# Patient Record
Sex: FEMALE | Race: BLACK OR AFRICAN AMERICAN | Employment: FULL TIME | URBAN - METROPOLITAN AREA
[De-identification: names, ages, dates, MRNs, and addresses within clinical notes are randomized per-mention and may not be internally consistent; named-entity substitution may affect disease eponyms.]

---

## 2017-03-24 ENCOUNTER — HOSPITAL ENCOUNTER (EMERGENCY)
Age: 32
Discharge: HOME | End: 2017-03-24
Attending: EMERGENCY MEDICINE | Admitting: EMERGENCY MEDICINE
Payer: MEDICAID

## 2017-03-24 ENCOUNTER — APPOINTMENT (OUTPATIENT)
Dept: GENERAL RADIOLOGY | Age: 32
End: 2017-03-24
Attending: PHYSICIAN ASSISTANT
Payer: MEDICAID

## 2017-03-24 VITALS
WEIGHT: 140.43 LBS | DIASTOLIC BLOOD PRESSURE: 71 MMHG | TEMPERATURE: 98.1 F | RESPIRATION RATE: 12 BRPM | BODY MASS INDEX: 23.4 KG/M2 | HEIGHT: 65 IN | HEART RATE: 72 BPM | SYSTOLIC BLOOD PRESSURE: 131 MMHG | OXYGEN SATURATION: 100 %

## 2017-03-24 DIAGNOSIS — M43.6 TORTICOLLIS: ICD-10-CM

## 2017-03-24 DIAGNOSIS — M54.2 NECK PAIN: Primary | ICD-10-CM

## 2017-03-24 LAB — HCG UR QL: NEGATIVE

## 2017-03-24 PROCEDURE — 72050 X-RAY EXAM NECK SPINE 4/5VWS: CPT

## 2017-03-24 PROCEDURE — 99284 EMERGENCY DEPT VISIT MOD MDM: CPT

## 2017-03-24 PROCEDURE — 81025 URINE PREGNANCY TEST: CPT

## 2017-03-24 PROCEDURE — 74011250637 HC RX REV CODE- 250/637: Performed by: PHYSICIAN ASSISTANT

## 2017-03-24 RX ORDER — DIAZEPAM 5 MG/1
5 TABLET ORAL
Qty: 10 TAB | Refills: 0 | Status: SHIPPED | OUTPATIENT
Start: 2017-03-24 | End: 2017-05-12

## 2017-03-24 RX ORDER — ALBUTEROL SULFATE 90 UG/1
2 AEROSOL, METERED RESPIRATORY (INHALATION)
COMMUNITY
End: 2019-07-09 | Stop reason: SDUPTHER

## 2017-03-24 RX ORDER — OXYCODONE AND ACETAMINOPHEN 5; 325 MG/1; MG/1
1 TABLET ORAL
Status: COMPLETED | OUTPATIENT
Start: 2017-03-24 | End: 2017-03-24

## 2017-03-24 RX ORDER — OXYCODONE AND ACETAMINOPHEN 5; 325 MG/1; MG/1
1 TABLET ORAL
Qty: 12 TAB | Refills: 0 | Status: SHIPPED | OUTPATIENT
Start: 2017-03-24 | End: 2017-05-12

## 2017-03-24 RX ORDER — DIAZEPAM 5 MG/1
5 TABLET ORAL
Status: COMPLETED | OUTPATIENT
Start: 2017-03-24 | End: 2017-03-24

## 2017-03-24 RX ADMIN — DIAZEPAM 5 MG: 5 TABLET ORAL at 08:53

## 2017-03-24 RX ADMIN — OXYCODONE HYDROCHLORIDE AND ACETAMINOPHEN 1 TABLET: 5; 325 TABLET ORAL at 08:53

## 2017-03-24 NOTE — ED NOTES
The provider reviewed discharge instructions with the patient. The patient verbalized understanding.

## 2017-03-24 NOTE — LETTER
[address] 
[facility] EMERGENCY DEPT 
[address] [place] [address] [id number] 
[phone] Work/School Note Date: 3/24/2017 To Whom It May concern: 
 
Gurdeep Menendez was seen and treated today in the emergency room by the following provider(s): 
Attending Provider: Shama Tripathi MD 
Physician Assistant: TONIE French. Gurdeep Menendez No work 48 hours. Sincerely, TONIE French

## 2017-03-24 NOTE — ED PROVIDER NOTES
HPI Comments: Abdifatah Lou is a 32 y.o. female with PMhx significant for Asthma who presents ambulatory to the ED with cc of gradually worsening neck pain x 4 days. Pt denies use of medication to modify her symptoms. She reports that she woke up with her symptoms noting that she sleeps with the window open. She denies any arm pain, numbness, weakness, or gait change. Social history significant for: + Tobacco ([id number]), + EtOH, - Illicit drug use    PCP: None    There are no other complaints, changes or physical findings at this time. Written by LESLY Hawley, as dictated by [employer]      The history is provided by the patient. No  was used. Past Medical History:   Diagnosis Date    Asthma     Chest pain, musculoskeletal        History reviewed. No pertinent surgical history. History reviewed. No pertinent family history. Social History     Social History    Marital status: SINGLE     Spouse name: N/A    Number of children: N/A    Years of education: N/A     Occupational History    Not on file. Social History Main Topics    Smoking status: Current Every Day Smoker     Packs/day: 0.25     Years: 10.00    Smokeless tobacco: Not on file    Alcohol use Yes      Comment: socially    Drug use: No    Sexual activity: Yes     Partners: Male     Birth control/ protection: None     Other Topics Concern    Not on file     Social History Narrative         ALLERGIES: Tomato    Review of Systems   Musculoskeletal: Positive for neck pain. Negative for myalgias. Neurological: Negative for weakness and numbness.        - gait changes   All other systems reviewed and are negative. Patient Vitals for the past 12 hrs:   Temp Pulse Resp BP SpO2   03/24/17 0839 98.1 °F (36.7 °C) 72 12 131/71 100 %       Physical Exam   Constitutional: She is oriented to person, place, and time. She appears well-developed and well-nourished. No distress.    HENT: Head: Normocephalic and atraumatic. Right Ear: External ear normal.   Left Ear: External ear normal.   Nose: Nose normal.   Mouth/Throat: Oropharynx is clear and moist. No oropharyngeal exudate. Eyes: Conjunctivae and EOM are normal. Pupils are equal, round, and reactive to light. Right eye exhibits no discharge. Left eye exhibits no discharge. No scleral icterus. Neck: Normal range of motion. Neck supple. No JVD present. No tracheal deviation present. Cardiovascular: Normal rate, regular rhythm, normal heart sounds and intact distal pulses. Exam reveals no gallop and no friction rub. No murmur heard. Pulmonary/Chest: Effort normal and breath sounds normal. No respiratory distress. She has no wheezes. She has no rales. She exhibits no tenderness. Abdominal: Soft. Bowel sounds are normal. She exhibits no distension and no mass. There is no tenderness. There is no rebound and no guarding. Musculoskeletal: She exhibits no edema or tenderness. Tenderness to right sternocleidomastoid  Decreased active passive ROM secondary to pain   Lymphadenopathy:     She has no cervical adenopathy. Neurological: She is alert and oriented to person, place, and time. She has normal reflexes. No cranial nerve deficit. She exhibits normal muscle tone. Coordination normal.   Skin: Skin is warm and dry. She is not diaphoretic. Psychiatric: She has a normal mood and affect. Her behavior is normal. Judgment and thought content normal.   Nursing note and vitals reviewed.        MDM  Number of Diagnoses or Management Options  Neck pain:   Torticollis:   Diagnosis management comments:   DDx: DDD, DJD, torticollar       Amount and/or Complexity of Data Reviewed  Clinical lab tests: ordered and reviewed  Tests in the radiology section of CPT®: ordered and reviewed  Review and summarize past medical records: yes    Patient Progress  Patient progress: stable    Procedures    LABORATORY TESTS:  Recent Results (from the past 12 hour(s))   HCG URINE, QL. - POC    Collection Time: 03/24/17  8:56 AM   Result Value Ref Range    Pregnancy test,urine (POC) NEGATIVE  NEG         IMAGING RESULTS:  XR SPINE CERV 4 OR 5 V   Final Result   EXAM: XR SPINE CERV 4 OR 5 V     INDICATION: Right-sided neck pain and stiffness for 4 days.     COMPARISON: None.     TECHNIQUE: 5 views cervical spine.     FINDINGS: Vertebral body heights and intervertebral disc spaces are maintained. There is no abnormality in alignment. The neural foramen are patent bilaterally. The C1-2 relationship is within normal limits. The prevertebral soft tissues are  unremarkable.     IMPRESSION  IMPRESSION: No acute abnormality. MEDICATIONS GIVEN:  Medications   oxyCODONE-acetaminophen (PERCOCET) 5-325 mg per tablet 1 Tab (1 Tab Oral Given 3/24/17 0853)   diazePAM (VALIUM) tablet 5 mg (5 mg Oral Given 3/24/17 0853)       IMPRESSION:  1. Neck pain    2. Torticollis        PLAN:  1. Current Discharge Medication List      START taking these medications    Details   oxyCODONE-acetaminophen (PERCOCET) 5-325 mg per tablet Take 1 Tab by mouth every six (6) hours as needed for Pain. Max Daily Amount: 4 Tabs. Qty: 12 Tab, Refills: 0      diazePAM (VALIUM) 5 mg tablet Take 1 Tab by mouth every twelve (12) hours as needed (spasm). Max Daily Amount: 10 mg.  Qty: 10 Tab, Refills: 0           2. Follow-up Information     Follow up With Details Comments Contact Info    None In 2 days As needed None ([phone]) Patient stated that they have no PCP      Melissa Bradford MD In 2 days As needed [address] [place] [address]  [phone]          Return to ED if worse     DISCHARGE NOTE  9:52 AM  The patient has been re-evaluated and is ready for discharge. Reviewed available results with patient. Counseled patient on diagnosis and care plan. Patient has expressed understanding, and all questions have been answered.  Patient agrees with plan and agrees to follow up as recommended, or return to the ED if their symptoms worsen. Discharge instructions have been provided and explained to the patient, along with reasons to return to the ED. This note is prepared by Thelma Calderon, acting as Scribe for [employer] TherapeuticsRaquel Barfield: The scribe's documentation has been prepared under my direction and personally reviewed by me in its entirety. I confirm that the note above accurately reflects all work, treatment, procedures, and medical decision making performed by me.

## 2017-03-24 NOTE — DISCHARGE INSTRUCTIONS
Neck Pain: Care Instructions  Your Care Instructions  You can have neck pain anywhere from the bottom of your head to the top of your shoulders. It can spread to the upper back or arms. Injuries, painting a ceiling, sleeping with your neck twisted, staying in one position for too long, and many other activities can cause neck pain. Most neck pain gets better with home care. Your doctor may recommend medicine to relieve pain or relax your muscles. He or she may suggest exercise and physical therapy to increase flexibility and relieve stress. You may need to wear a special (cervical) collar to support your neck for a day or two. Follow-up care is a key part of your treatment and safety. Be sure to make and go to all appointments, and call your doctor if you are having problems. It's also a good idea to know your test results and keep a list of the medicines you take. How can you care for yourself at home? · Try using a heating pad on a low or medium setting for 15 to 20 minutes every 2 or 3 hours. Try a warm shower in place of one session with the heating pad. · You can also try an ice pack for 10 to 15 minutes every 2 to 3 hours. Put a thin cloth between the ice and your skin. · Take pain medicines exactly as directed. ¨ If the doctor gave you a prescription medicine for pain, take it as prescribed. ¨ If you are not taking a prescription pain medicine, ask your doctor if you can take an over-the-counter medicine. · If your doctor recommends a cervical collar, wear it exactly as directed. When should you call for help? Call your doctor now or seek immediate medical care if:  · You have new or worsening numbness in your arms, buttocks or legs. · You have new or worsening weakness in your arms or legs. (This could make it hard to stand up.)  · You lose control of your bladder or bowels.   Watch closely for changes in your health, and be sure to contact your doctor if:  · Your neck pain is getting worse.  · You are not getting better after 1 week. · You do not get better as expected. Where can you learn more? Go to http://blane-danna.info/. Enter [id number] in the search box to learn more about \"Neck Pain: Care Instructions. \"  Current as of: May 23, 2016  Content Version: 11.1  © 9697-0284 [employer]. Care instructions adapted under license by [employer] (which disclaims liability or warranty for this information). If you have questions about a medical condition or this instruction, always ask your healthcare professional. [place] [address] any warranty or liability for your use of this information. Torticollis: Care Instructions  Your Care Instructions  Torticollis is a severe tightness of the muscles on one side of the neck. The tight muscles can make the head turn to one side, lean to one side, or be pulled forward or backward. It is also called wryneck. Your doctor asked questions about your health and examined you. You may also have had X-rays or other tests. If your doctor thinks another medical problem is causing your tight neck muscles, you may need more tests. Torticollis usually gets better with home care. Your doctor may have you take medicine to relieve pain or relax your muscles. He or she may suggest exercise and physical therapy to help increase flexibility and relieve stress. Your doctor may also have you wear a special collar, called a cervical collar, for a day or two. The collar may help make your neck more comfortable. Follow-up care is a key part of your treatment and safety. Be sure to make and go to all appointments, and call your doctor if you are having problems. It's also a good idea to know your test results and keep a list of the medicines you take. How can you care for yourself at home? · Be safe with medicines. Read and follow all instructions on the label.   ¨ If the doctor gave you a prescription medicine for pain, take it as prescribed. ¨ If you are not taking a prescription pain medicine, ask your doctor if you can take an over-the-counter medicine. · Try using a heating pad on a low or medium setting for 15 to 20 minutes every 2 or 3 hours. Try a warm shower in place of one session with the heating pad. · Try using an ice pack for 10 to 15 minutes every 2 to 3 hours. Put a thin cloth between the ice and your skin. · If your doctor recommends a cervical collar, wear it exactly as directed. When should you call for help? Call your doctor now or seek immediate medical care if:  · You have new or worse numbness in your arms, buttocks, or legs. · You have new or worse weakness in your arms or legs. · Your neck pain gets worse. · You lose bladder or bowel control. Watch closely for changes in your health, and be sure to contact your doctor if:  · You do not get better as expected. Where can you learn more? Go to http://blane-danna.info/. Enter G651 in the search box to learn more about \"Torticollis: Care Instructions. \"  Current as of: May 23, 2016  Content Version: 11.1  © 6087-2523 [employer]. Care instructions adapted under license by [employer] (which disclaims liability or warranty for this information). If you have questions about a medical condition or this instruction, always ask your healthcare professional. [place] [address] any warranty or liability for your use of this information.

## 2017-05-12 ENCOUNTER — APPOINTMENT (OUTPATIENT)
Dept: GENERAL RADIOLOGY | Age: 32
End: 2017-05-12
Attending: EMERGENCY MEDICINE
Payer: MEDICAID

## 2017-05-12 ENCOUNTER — APPOINTMENT (OUTPATIENT)
Dept: CT IMAGING | Age: 32
End: 2017-05-12
Attending: EMERGENCY MEDICINE
Payer: MEDICAID

## 2017-05-12 ENCOUNTER — HOSPITAL ENCOUNTER (EMERGENCY)
Age: 32
Discharge: HOME | End: 2017-05-12
Attending: EMERGENCY MEDICINE | Admitting: EMERGENCY MEDICINE
Payer: MEDICAID

## 2017-05-12 VITALS
HEART RATE: 77 BPM | DIASTOLIC BLOOD PRESSURE: 64 MMHG | WEIGHT: 131.84 LBS | OXYGEN SATURATION: 100 % | SYSTOLIC BLOOD PRESSURE: 104 MMHG | TEMPERATURE: 98.9 F | BODY MASS INDEX: 21.97 KG/M2 | HEIGHT: 65 IN | RESPIRATION RATE: 16 BRPM

## 2017-05-12 DIAGNOSIS — R07.89 ATYPICAL CHEST PAIN: ICD-10-CM

## 2017-05-12 DIAGNOSIS — S06.0X0A CONCUSSION, WITHOUT LOC, INITIAL ENCOUNTER: Primary | ICD-10-CM

## 2017-05-12 DIAGNOSIS — R42 LIGHTHEADEDNESS: ICD-10-CM

## 2017-05-12 DIAGNOSIS — N30.00 ACUTE CYSTITIS WITHOUT HEMATURIA: ICD-10-CM

## 2017-05-12 LAB
ALBUMIN SERPL BCP-MCNC: 3.7 G/DL (ref 3.5–5)
ALBUMIN/GLOB SERPL: 1 {RATIO} (ref 1.1–2.2)
ALP SERPL-CCNC: 56 U/L (ref 45–117)
ALT SERPL-CCNC: 12 U/L (ref 12–78)
ANION GAP BLD CALC-SCNC: 4 MMOL/L (ref 5–15)
APPEARANCE UR: ABNORMAL
AST SERPL W P-5'-P-CCNC: 9 U/L (ref 15–37)
BACTERIA URNS QL MICRO: ABNORMAL /HPF
BASOPHILS # BLD AUTO: 0.1 K/UL (ref 0–0.1)
BASOPHILS # BLD: 1 % (ref 0–1)
BILIRUB SERPL-MCNC: 0.4 MG/DL (ref 0.2–1)
BILIRUB UR QL: NEGATIVE
BUN SERPL-MCNC: 6 MG/DL (ref 6–20)
BUN/CREAT SERPL: 9 (ref 12–20)
CALCIUM SERPL-MCNC: 8.4 MG/DL (ref 8.5–10.1)
CHLORIDE SERPL-SCNC: 107 MMOL/L (ref 97–108)
CK SERPL-CCNC: 149 U/L (ref 26–192)
CO2 SERPL-SCNC: 29 MMOL/L (ref 21–32)
COLOR UR: ABNORMAL
CREAT SERPL-MCNC: 0.68 MG/DL (ref 0.55–1.02)
EOSINOPHIL # BLD: 0.5 K/UL (ref 0–0.4)
EOSINOPHIL NFR BLD: 7 % (ref 0–7)
EPITH CASTS URNS QL MICRO: ABNORMAL /LPF
ERYTHROCYTE [DISTWIDTH] IN BLOOD BY AUTOMATED COUNT: 12.3 % (ref 11.5–14.5)
GLOBULIN SER CALC-MCNC: 3.6 G/DL (ref 2–4)
GLUCOSE SERPL-MCNC: 79 MG/DL (ref 65–100)
GLUCOSE UR STRIP.AUTO-MCNC: NEGATIVE MG/DL
HCG UR QL: NEGATIVE
HCT VFR BLD AUTO: 36.6 % (ref 35–47)
HGB BLD-MCNC: 12.6 G/DL (ref 11.5–16)
HGB UR QL STRIP: NEGATIVE
KETONES UR QL STRIP.AUTO: NEGATIVE MG/DL
LEUKOCYTE ESTERASE UR QL STRIP.AUTO: ABNORMAL
LYMPHOCYTES # BLD AUTO: 29 % (ref 12–49)
LYMPHOCYTES # BLD: 2.1 K/UL (ref 0.8–3.5)
MAGNESIUM SERPL-MCNC: 2.1 MG/DL (ref 1.6–2.4)
MCH RBC QN AUTO: 32.4 PG (ref 26–34)
MCHC RBC AUTO-ENTMCNC: 34.4 G/DL (ref 30–36.5)
MCV RBC AUTO: 94.1 FL (ref 80–99)
MONOCYTES # BLD: 0.6 K/UL (ref 0–1)
MONOCYTES NFR BLD AUTO: 9 % (ref 5–13)
NEUTS SEG # BLD: 4.1 K/UL (ref 1.8–8)
NEUTS SEG NFR BLD AUTO: 54 % (ref 32–75)
NITRITE UR QL STRIP.AUTO: NEGATIVE
PH UR STRIP: 7.5 [PH] (ref 5–8)
PLATELET # BLD AUTO: 253 K/UL (ref 150–400)
POTASSIUM SERPL-SCNC: 3.8 MMOL/L (ref 3.5–5.1)
PROT SERPL-MCNC: 7.3 G/DL (ref 6.4–8.2)
PROT UR STRIP-MCNC: NEGATIVE MG/DL
RBC # BLD AUTO: 3.89 M/UL (ref 3.8–5.2)
RBC #/AREA URNS HPF: ABNORMAL /HPF (ref 0–5)
SODIUM SERPL-SCNC: 140 MMOL/L (ref 136–145)
SP GR UR REFRACTOMETRY: 1.01 (ref 1–1.03)
TROPONIN I SERPL-MCNC: <0.04 NG/ML
UROBILINOGEN UR QL STRIP.AUTO: 0.2 EU/DL (ref 0.2–1)
WBC # BLD AUTO: 7.4 K/UL (ref 3.6–11)
WBC URNS QL MICRO: ABNORMAL /HPF (ref 0–4)

## 2017-05-12 PROCEDURE — 81001 URINALYSIS AUTO W/SCOPE: CPT | Performed by: EMERGENCY MEDICINE

## 2017-05-12 PROCEDURE — 36415 COLL VENOUS BLD VENIPUNCTURE: CPT | Performed by: EMERGENCY MEDICINE

## 2017-05-12 PROCEDURE — 85025 COMPLETE CBC W/AUTO DIFF WBC: CPT | Performed by: EMERGENCY MEDICINE

## 2017-05-12 PROCEDURE — 81025 URINE PREGNANCY TEST: CPT | Performed by: EMERGENCY MEDICINE

## 2017-05-12 PROCEDURE — 96374 THER/PROPH/DIAG INJ IV PUSH: CPT

## 2017-05-12 PROCEDURE — 83735 ASSAY OF MAGNESIUM: CPT | Performed by: EMERGENCY MEDICINE

## 2017-05-12 PROCEDURE — 99284 EMERGENCY DEPT VISIT MOD MDM: CPT

## 2017-05-12 PROCEDURE — 93005 ELECTROCARDIOGRAM TRACING: CPT

## 2017-05-12 PROCEDURE — 84484 ASSAY OF TROPONIN QUANT: CPT | Performed by: EMERGENCY MEDICINE

## 2017-05-12 PROCEDURE — 74011250636 HC RX REV CODE- 250/636: Performed by: EMERGENCY MEDICINE

## 2017-05-12 PROCEDURE — 96361 HYDRATE IV INFUSION ADD-ON: CPT

## 2017-05-12 PROCEDURE — 80053 COMPREHEN METABOLIC PANEL: CPT | Performed by: EMERGENCY MEDICINE

## 2017-05-12 PROCEDURE — 70450 CT HEAD/BRAIN W/O DYE: CPT

## 2017-05-12 PROCEDURE — 82550 ASSAY OF CK (CPK): CPT | Performed by: EMERGENCY MEDICINE

## 2017-05-12 PROCEDURE — 71020 XR CHEST PA LAT: CPT

## 2017-05-12 PROCEDURE — 96375 TX/PRO/DX INJ NEW DRUG ADDON: CPT

## 2017-05-12 RX ORDER — METHOCARBAMOL 750 MG/1
750 TABLET, FILM COATED ORAL
Qty: 20 TAB | Refills: 0 | Status: SHIPPED | OUTPATIENT
Start: 2017-05-12 | End: 2017-05-21

## 2017-05-12 RX ORDER — DIAZEPAM 10 MG/2ML
2 INJECTION INTRAMUSCULAR
Status: COMPLETED | OUTPATIENT
Start: 2017-05-12 | End: 2017-05-12

## 2017-05-12 RX ORDER — DIPHENHYDRAMINE HYDROCHLORIDE 50 MG/ML
25 INJECTION, SOLUTION INTRAMUSCULAR; INTRAVENOUS
Status: COMPLETED | OUTPATIENT
Start: 2017-05-12 | End: 2017-05-12

## 2017-05-12 RX ORDER — ONDANSETRON 4 MG/1
4 TABLET, ORALLY DISINTEGRATING ORAL
Qty: 10 TAB | Refills: 0 | Status: SHIPPED | OUTPATIENT
Start: 2017-05-12 | End: 2019-08-05

## 2017-05-12 RX ORDER — ALBUTEROL SULFATE 0.83 MG/ML
SOLUTION RESPIRATORY (INHALATION)
COMMUNITY
End: 2019-07-09 | Stop reason: SDUPTHER

## 2017-05-12 RX ORDER — MORPHINE SULFATE 2 MG/ML
2 INJECTION, SOLUTION INTRAMUSCULAR; INTRAVENOUS
Status: COMPLETED | OUTPATIENT
Start: 2017-05-12 | End: 2017-05-12

## 2017-05-12 RX ORDER — CEPHALEXIN 500 MG/1
500 CAPSULE ORAL 3 TIMES DAILY
Qty: 15 CAP | Refills: 0 | Status: SHIPPED | OUTPATIENT
Start: 2017-05-12 | End: 2019-07-09 | Stop reason: ALTCHOICE

## 2017-05-12 RX ORDER — ONDANSETRON 2 MG/ML
4 INJECTION INTRAMUSCULAR; INTRAVENOUS
Status: COMPLETED | OUTPATIENT
Start: 2017-05-12 | End: 2017-05-12

## 2017-05-12 RX ORDER — KETOROLAC TROMETHAMINE 30 MG/ML
30 INJECTION, SOLUTION INTRAMUSCULAR; INTRAVENOUS
Status: COMPLETED | OUTPATIENT
Start: 2017-05-12 | End: 2017-05-12

## 2017-05-12 RX ORDER — HYDROCODONE BITARTRATE AND ACETAMINOPHEN 5; 325 MG/1; MG/1
1 TABLET ORAL
Qty: 20 TAB | Refills: 0 | Status: SHIPPED | OUTPATIENT
Start: 2017-05-12 | End: 2017-05-21

## 2017-05-12 RX ADMIN — ONDANSETRON HYDROCHLORIDE 4 MG: 2 INJECTION, SOLUTION INTRAMUSCULAR; INTRAVENOUS at 15:47

## 2017-05-12 RX ADMIN — DIPHENHYDRAMINE HYDROCHLORIDE 25 MG: 50 INJECTION, SOLUTION INTRAMUSCULAR; INTRAVENOUS at 16:46

## 2017-05-12 RX ADMIN — SODIUM CHLORIDE 1000 ML: 900 INJECTION, SOLUTION INTRAVENOUS at 15:54

## 2017-05-12 RX ADMIN — KETOROLAC TROMETHAMINE 30 MG: 30 INJECTION, SOLUTION INTRAMUSCULAR at 16:48

## 2017-05-12 RX ADMIN — DIAZEPAM 2 MG: 5 INJECTION, SOLUTION INTRAMUSCULAR; INTRAVENOUS at 16:49

## 2017-05-12 RX ADMIN — Medication 2 MG: at 15:47

## 2017-05-12 NOTE — ED NOTES
Patient identified and read over and explained discharge instructions with time for questions by attending MD/PA. Patient has verbalized understanding of discharge instructions. Discharge with boyfriend.

## 2017-05-12 NOTE — DISCHARGE INSTRUCTIONS
Concussion: Care Instructions  Your Care Instructions    A concussion is a kind of injury to the brain. It happens when the head receives a hard blow. The impact can jar or shake the brain against the skull. This interrupts the brain's normal activities. Although you may have cuts or bruises on your head or face, you may have no other visible signs of a brain injury. In most cases, damage to the brain from a concussion can't be seen in tests such as a CT or MRI scan. For a few weeks, you may have low energy, dizziness, trouble sleeping, a headache, ringing in your ears, or nausea. You may also feel anxious, grumpy, or depressed. You may have problems with memory and concentration. These symptoms are common after a concussion. They should slowly improve over time. Sometimes this takes weeks or even months. Someone who lives with you should know how to care for you. Please share this and all information with a caregiver who will be available to help if needed. Follow-up care is a key part of your treatment and safety. Be sure to make and go to all appointments, and call your doctor if you are having problems. It's also a good idea to know your test results and keep a list of the medicines you take. How can you care for yourself at home? Pain control  · Put ice or a cold pack on the part of your head that hurts for 10 to 20 minutes at a time. Put a thin cloth between the ice and your skin. · Be safe with medicines. Read and follow all instructions on the label. ¨ If the doctor gave you a prescription medicine for pain, take it as prescribed. ¨ If you are not taking a prescription pain medicine, ask your doctor if you can take an over-the-counter medicine. Recovery  · Follow your doctor's instructions. He or she will tell you if you need someone to watch you closely for the next 24 hours or longer. · Rest is the best way to recover from a concussion.  You need to rest your body and your brain:  ¨ Get plenty of sleep at night. And take rest breaks during the day. ¨ Avoid activities that take a lot of physical or mental work. This includes housework, exercise, schoolwork, video games, text messaging, and using the computer. ¨ You may need to change your school or work schedule while you recover. ¨ Return to your normal activities slowly. Do not try to do too much at once. · Do not drink alcohol or use illegal drugs. Alcohol and illegal drugs can slow your recovery. And they can increase your risk of a second brain injury. · Avoid activities that could lead to another concussion. Follow your doctor's instructions for a gradual return to activity and sports. · Ask your doctor when it's okay for you to drive a car, ride a bike, or operate machinery. How should you return to activity? Your return to sports or activity should be gradual. It should only begin when all symptoms of a concussion are gone, both while at rest and during exercise or exertion. Doctors and concussion specialists suggest steps to follow for returning to sports after a concussion. Use these steps as a guide. You should slowly progress through the following levels of activity:  1. No activity. This means complete physical and mental rest.  2. Light aerobic activity. This can include walking, swimming, or other exercise at less than 70% of maximum heart rate. No resistance training is included in this step. 3. Sport-specific exercise. This includes running drills or skating drills (depending on the sport), but no head impact. 4. Noncontact training drills. This includes more complex training drills such as passing. The athlete may also begin light resistance training. 5. Full-contact practice. The athlete can participate in normal training. 6. Return to normal game play. This is the final step and allows the athlete to join in normal game play. Watch and keep track of your progress.  It should take at least 6 days for you to go from light activity to normal game play. Make sure that you can stay at each new level of activity for at least 24 hours without symptoms, or as long as your doctor says, before doing more. If one or more symptoms come back, return to a lower level of activity for at least 24 hours. Don't move on until all symptoms are gone. When should you call for help? Call 911 anytime you think you may need emergency care. For example, call if:  · You have a seizure. · You passed out (lost consciousness). · You are confused or can't stay awake. Call your doctor now or seek immediate medical care if:  · You have new or worse vomiting. · You feel less alert. · You have new weakness or numbness in any part of your body. Watch closely for changes in your health, and be sure to contact your doctor if:  · You do not get better as expected. · You have new symptoms, such as headaches, trouble concentrating, or changes in mood. Where can you learn more? Go to http://blane-danna.info/. Enter Z149 in the search box to learn more about \"Concussion: Care Instructions. \"  Current as of: October 14, 2016  Content Version: 11.2  © 6281-5635 [employer]. Care instructions adapted under license by nodishes.co.uk (which disclaims liability or warranty for this information). If you have questions about a medical condition or this instruction, always ask your healthcare professional. [place] [address] any warranty or liability for your use of this information. Chest Pain: Care Instructions  Your Care Instructions  There are many things that can cause chest pain. Some are not serious and will get better on their own in a few days. But some kinds of chest pain need more testing and treatment. Your doctor may have recommended a follow-up visit in the next 8 to 12 hours. If you are not getting better, you may need more tests or treatment.   Even though your doctor has released you, you still need to watch for any problems. The doctor carefully checked you, but sometimes problems can develop later. If you have new symptoms or if your symptoms do not get better, get medical care right away. If you have worse or different chest pain or pressure that lasts more than 5 minutes or you passed out (lost consciousness), call 911 or seek other emergency help right away. A medical visit is only one step in your treatment. Even if you feel better, you still need to do what your doctor recommends, such as going to all suggested follow-up appointments and taking medicines exactly as directed. This will help you recover and help prevent future problems. How can you care for yourself at home? · Rest until you feel better. · Take your medicine exactly as prescribed. Call your doctor if you think you are having a problem with your medicine. · Do not drive after taking a prescription pain medicine. When should you call for help? Call 911 if:  · You passed out (lost consciousness). · You have severe difficulty breathing. · You have symptoms of a heart attack. These may include:  ¨ Chest pain or pressure, or a strange feeling in your chest.  ¨ Sweating. ¨ Shortness of breath. ¨ Nausea or vomiting. ¨ Pain, pressure, or a strange feeling in your back, neck, jaw, or upper belly or in one or both shoulders or arms. ¨ Lightheadedness or sudden weakness. ¨ A fast or irregular heartbeat. After you call 911, the  may tell you to chew 1 adult-strength or 2 to 4 low-dose aspirin. Wait for an ambulance. Do not try to drive yourself. Call your doctor today if:  · You have any trouble breathing. · Your chest pain gets worse. · You are dizzy or lightheaded, or you feel like you may faint. · You are not getting better as expected. · You are having new or different chest pain. Where can you learn more? Go to http://blane-danna.info/.   Enter A120 in the search box to learn more about \"Chest Pain: Care Instructions. \"  Current as of: May 27, 2016  Content Version: 11.2  © 8322-5179 [employer]. Care instructions adapted under license by [employer] (which disclaims liability or warranty for this information). If you have questions about a medical condition or this instruction, always ask your healthcare professional. [address] any warranty or liability for your use of this information. Lightheadedness or Faintness: Care Instructions  Your Care Instructions  Lightheadedness is a feeling that you are about to faint or \"pass out. \" You do not feel as if you or your surroundings are moving. It is different from vertigo, which is the feeling that you or things around you are spinning or tilting. Lightheadedness usually goes away or gets better when you lie down. If lightheadedness gets worse, it can lead to a fainting spell. It is common to feel lightheaded from time to time. Lightheadedness usually is not caused by a serious problem. It often is caused by a short-lasting drop in blood pressure and blood flow to your head that occurs when you get up too quickly from a seated or lying position. Follow-up care is a key part of your treatment and safety. Be sure to make and go to all appointments, and call your doctor if you are having problems. It's also a good idea to know your test results and keep a list of the medicines you take. How can you care for yourself at home? · Lie down for 1 or 2 minutes when you feel lightheaded. After lying down, sit up slowly and remain sitting for 1 to 2 minutes before slowly standing up. · Avoid movements, positions, or activities that have made you lightheaded in the past.  · Get plenty of rest, especially if you have a cold or flu, which can cause lightheadedness. · Make sure you drink plenty of fluids, especially if you have a fever or have been sweating.   · Do not drive or put yourself and others in danger while you feel lightheaded. When should you call for help? Call 911 anytime you think you may need emergency care. For example, call if:  · You have symptoms of a stroke. These may include:  ¨ Sudden numbness, tingling, weakness, or loss of movement in your face, arm, or leg, especially on only one side of your body. ¨ Sudden vision changes. ¨ Sudden trouble speaking. ¨ Sudden confusion or trouble understanding simple statements. ¨ Sudden problems with walking or balance. ¨ A sudden, severe headache that is different from past headaches. · You have symptoms of a heart attack. These may include:  ¨ Chest pain or pressure, or a strange feeling in the chest.  ¨ Sweating. ¨ Shortness of breath. ¨ Nausea or vomiting. ¨ Pain, pressure, or a strange feeling in the back, neck, jaw, or upper belly or in one or both shoulders or arms. ¨ Lightheadedness or sudden weakness. ¨ A fast or irregular heartbeat. After you call 911, the  may tell you to chew 1 adult-strength or 2 to 4 low-dose aspirin. Wait for an ambulance. Do not try to drive yourself. Watch closely for changes in your health, and be sure to contact your doctor if:  · Your lightheadedness gets worse or does not get better with home care. Where can you learn more? Go to http://blane-danna.info/. Enter Q897 in the search box to learn more about \"Lightheadedness or Faintness: Care Instructions. \"  Current as of: May 27, 2016  Content Version: 11.2  © 5254-4227 [employer]. Care instructions adapted under license by Backpack (which disclaims liability or warranty for this information). If you have questions about a medical condition or this instruction, always ask your healthcare professional. [place] [address] any warranty or liability for your use of this information.          Urinary Tract Infection in Women: Care Instructions  Your Care Instructions    A urinary tract infection, or UTI, is a general term for an infection anywhere between the kidneys and the urethra (where urine comes out). Most UTIs are bladder infections. They often cause pain or burning when you urinate. UTIs are caused by bacteria and can be cured with antibiotics. Be sure to complete your treatment so that the infection goes away. Follow-up care is a key part of your treatment and safety. Be sure to make and go to all appointments, and call your doctor if you are having problems. It's also a good idea to know your test results and keep a list of the medicines you take. How can you care for yourself at home? · Take your antibiotics as directed. Do not stop taking them just because you feel better. You need to take the full course of antibiotics. · Drink extra water and other fluids for the next day or two. This may help wash out the bacteria that are causing the infection. (If you have kidney, heart, or liver disease and have to limit fluids, talk with your doctor before you increase your fluid intake.)  · Avoid drinks that are carbonated or have caffeine. They can irritate the bladder. · Urinate often. Try to empty your bladder each time. · To relieve pain, take a hot bath or lay a heating pad set on low over your lower belly or genital area. Never go to sleep with a heating pad in place. To prevent UTIs  · Drink plenty of water each day. This helps you urinate often, which clears bacteria from your system. (If you have kidney, heart, or liver disease and have to limit fluids, talk with your doctor before you increase your fluid intake.)  · Urinate when you need to. · Urinate right after you have sex. · Change sanitary pads often. · Avoid douches, bubble baths, feminine hygiene sprays, and other feminine hygiene products that have deodorants. · After going to the bathroom, wipe from front to back. When should you call for help?   Call your doctor now or seek immediate medical care if:  · Symptoms such as fever, chills, nausea, or vomiting get worse or appear for the first time. · You have new pain in your back just below your rib cage. This is called flank pain. · There is new blood or pus in your urine. · You have any problems with your antibiotic medicine. Watch closely for changes in your health, and be sure to contact your doctor if:  · You are not getting better after taking an antibiotic for 2 days. · Your symptoms go away but then come back. Where can you learn more? Go to http://blane-danna.info/. Enter M931 in the search box to learn more about \"Urinary Tract Infection in Women: Care Instructions. \"  Current as of: November 28, 2016  Content Version: 11.2  © 3392-7204 [employer]ge[employer], W[employer]pp. Care instructions adapted under license by [employer] (which disclaims liability or warranty for this information). If you have questions about a medical condition or this instruction, always ask your healthcare professional. [place] [address] any warranty or liability for your use of this information. [facility] Departments     For adult and child immunizations, family planning, TB screening, STD testing and women's health services. [facility]: [place] [phone]      Psychiatric 25   657 [facility]   [address]   170 [facility]: [address] [phone]      [facility]          [address] [place] [id number]     For primary care services, woman and child wellness, and some clinics providing specialty care. [facility] -- 1011 [facility]. [address] [place] [address] [phone]   [address] [place] [address] CHILDREN'S [facility] 200 [facility] 3614 [facility] [phone]   339 [facility] End [facility] [address]  [phone] [address] [facility] [address] [place] [address]  [phone]   [facility]2 [facility] [phone] [facility] 81 [facility] [phone]   [facility] 1051 [facility] [phone]   [facility]: [facility] doug Curry [address] [facility], [address] [phone]     [address] [phone]   [facility] 20000 [facility] [phone]   [facility]  [address] ([email or web address]) [phone]         Sexual Health/Woman Wellness Clinics    For STD/HIV testing and treatment, pregnancy testing and services, men's health, birth control services, LGBT services, and hepatitis/HPV vaccine services. [facility] [employer] [address] [facility] 75 [facility] 1579 [address] [phone]   [facility] [address] , 5th floor [phone]   Pregnancy 3928 [facility] [address] [facility] [address]  [address] [phone]         [address] [facility] [address] [place] [address]   [phone]   [place]   [phone]   Women, Infant and Children's Services: [address] [phone]       [address] [phone]   [facility] Crisis Intervention   [phone] 4800 [facility]   [phone]   [facility] Psychiatry     [phone]   [address] Crisis   [phone]   [facility] [phone]     Local Primary Care Physicians  [address] [facility] [phone]  MD Alva Green MD Janalee Sober, MD [facility] [phone]  Sophia Garcia, MD Keith Navarro MD Lajuanda Gate, MD   [address] [phone]  MD Lino Ornelas MD [facility] [phone]  MD Silviano Harper, MD Han Luciano, MD Cecelia Stallings MD   [facility] [phone]  [facility] KATERINA GUO, MD Leonor Limon, MD Anderson Gill, NP [address] [phone]  Bubba Devi, MD Efren Aguilar, MD Rupesh Sy, MD Mary Bey, MD Mustapha Hogue, MD Keith Nix, MD Maurizio Rivera MD   33 57 [facility]  Laurie Osler, MD [facility] [phone]  Dipika Figueroa, MD Ion Bustos, NP  Evie Chapman, MD Dorinda Quinteros, MD Yazan Macias, MD Cirilo Lee, MD Obi Song MD   6018 [facility] [phone]  Shruthi Ty, MD Pancho Strickland, [facility]  Valerie Humphries, NP  Donovan Madera, MD Zhane Gutierres, MD Grabiel Martinez MD [facility] [phone]  Philly Dahl, MD Trina Fischer, MD Arun Mckoy, MD Jim Hayden MD   [address] [phone]  MD Kateryna Schwartz MD [facility] [phone]  Lucian French, MD Angie Ayers MD   [facility] [phone]  MD Salo Treviño, MD Anjali Nichols, MD Shannan Warren, MD Davy Pantoja, NP  Zane Wynne MD [address]   [phone]  MD Christen Crenshaw, MD Thaddeus Hart MD   [address] [facility] [address] [phone]  [address], MD Shannon Carbajal, FNP  Norm House, YAMILE House, FNP  Leopoldo Junes, PA-C Margretta Reding, MD  Erika JESUS Pena, DO Miscellaneous:  Desirae Mcfarland [phone]

## 2017-05-12 NOTE — ED TRIAGE NOTES
Pt reports a small knot on R side of forehead that seems to be getting worse and is sore. Does not itch. Also having bad HA's for last 2 weeks and reports having hit her head about the same time the HA's began. Pt very tearful and states has been feeling very stressed out.

## 2017-05-12 NOTE — ED PROVIDER NOTES
HPI Comments: Elda Her is a 32 y.o. female presenting ambulatory to ED [facility] c/o right sided headache since this morning. Pt notes that she woke up with a painful \"knot\" on her right forehead, and noticed as she was getting ready that the \"knot\" was getting bigger. At first she thought it may have been a spider bite, due to exposure to spiders. Per pt, the pain is 7/10. She reports additional symptoms of headaches, lightheadedness, chest pain, productive cough with yellow phlegm, congestion, abdominal pain, and neck pain. Pt notes that she fell 2 weeks ago without LOC, and since then she has had intermittent \"stabbing\" headaches everyday. Prior to the fall, she has had headaches but not as frequently. For her cold symptoms, she has been taking NyQuil, DayQuil, using her inhaler, and giving breathing treatments without relief. Pt notes that she recently saw her doctor for her irregular period. She was told to follow up with her OBGYN today, but was not able to because she came to ER. Pt denies fever, chills, vaginal bleeding, itchiness, urinary symptoms, bowel symptoms, use of NSAIDs, or hx of migraines. PCP: None     Social Hx: + tobacco, + EtOH (occasional), + illicit drugs (marijuana)    There are no other complaints, changes, or physical findings at this time. The history is provided by the patient. No  was used. Past Medical History:   Diagnosis Date    Asthma     Chest pain, musculoskeletal     Ovarian cyst        History reviewed. No pertinent surgical history. History reviewed. No pertinent family history. Social History     Social History    Marital status: SINGLE     Spouse name: N/A    Number of children: N/A    Years of education: N/A     Occupational History    Not on file.      Social History Main Topics    Smoking status: Current Every Day Smoker     Packs/day: 0.25     Years: 10.00    Smokeless tobacco: Not on file    Alcohol use Yes Comment: socially    Drug use: Yes     Special: Marijuana    Sexual activity: Yes     Partners: Male     Birth control/ protection: None     Other Topics Concern    Not on file     Social History Narrative         ALLERGIES: Tomato    Review of Systems   Constitutional: Negative. Negative for chills and fever. HENT: Positive for congestion. Negative for ear pain. Eyes: Negative. Respiratory: Positive for cough. Negative for shortness of breath. Cardiovascular: Positive for chest pain. Gastrointestinal: Positive for abdominal pain. Negative for constipation, diarrhea, nausea and vomiting. Endocrine: Negative for heat intolerance. Genitourinary: Negative for dysuria, frequency and vaginal bleeding. Musculoskeletal: Positive for neck pain. Skin: Negative. Allergic/Immunologic: Negative for immunocompromised state. Neurological: Positive for light-headedness and headaches. Hematological: Does not bruise/bleed easily. Psychiatric/Behavioral: Negative. All other systems reviewed and are negative. Patient Vitals for the past 12 hrs:   Temp Pulse Resp BP SpO2   05/12/17 1724 - 77 16 104/64 100 %   05/12/17 1646 - - - 108/67 -   05/12/17 1545 - - - 108/64 98 %   05/12/17 1420 98.9 °F (37.2 °C) (!) 103 16 117/77 100 %            Physical Exam   Constitutional: She is oriented to person, place, and time. She appears well-developed and well-nourished. No distress. No acute distress   HENT:   Head: Normocephalic and atraumatic. Right frontal tenderness   Eyes: EOM are normal. Pupils are equal, round, and reactive to light. Neck: Normal range of motion. Neck tender   Cardiovascular: Normal rate, regular rhythm and normal heart sounds. Pulmonary/Chest: Effort normal and breath sounds normal. No respiratory distress. She exhibits no tenderness. Chest wall non tender   Abdominal: Soft. Bowel sounds are normal. She exhibits no mass. There is no tenderness.    Musculoskeletal: Normal range of motion. She exhibits no edema. Neurological: She is alert and oriented to person, place, and time. Coordination normal.   No focal deficits   Skin: Skin is warm and dry. No significant erythema   Psychiatric: She has a normal mood and affect. Nursing note and vitals reviewed. MDM  Number of Diagnoses or Management Options  Acute cystitis without hematuria:   Atypical chest pain:   Concussion, without LOC, initial encounter:   Lightheadedness:   Diagnosis management comments: DDx: concussion, URI, bronchitis, atypical chest pain, anemia, UTI, contusion, insect bite       Amount and/or Complexity of Data Reviewed  Clinical lab tests: ordered and reviewed  Tests in the radiology section of CPT®: ordered and reviewed  Tests in the medicine section of CPT®: ordered and reviewed  Review and summarize past medical records: yes  Independent visualization of images, tracings, or specimens: yes    Patient Progress  Patient progress: stable    ED Course       Procedures    EKG interpretation: (Preliminary)  3:34 PM  Rhythm: normal sinus rhythm; and regular . Rate (approx.): 73; Axis: normal; HI interval: normal; QRS interval: normal ; ST/T wave: Non-specific ST abnormality in leads 3, V1, V2.    3:01 PM  Discussed the risks of smoking and the benefits of smoking cessation with the pt who verbalized her understanding. Written by LESLY Fowler, as dictated by Isabella Mccullough MD.    4:27 PM  Her headache is a 6/10, and it is the only thing causing her pain right now. Written by LESLY Fowler, as dictated by Isabella Mccullough MD.    5:26  Pt's headache is down to 3/10.   Written by LESLY Fowler, as dictated by Isabella Mccullough MD.    LABORATORY TESTS:  Recent Results (from the past 12 hour(s))   EKG, 12 LEAD, INITIAL    Collection Time: 05/12/17  3:34 PM   Result Value Ref Range    Ventricular Rate 73 BPM    Atrial Rate 73 BPM    P-R Interval 178 ms    QRS Duration 68 ms Q-T Interval 374 ms    QTC Calculation (Bezet) 412 ms    Calculated P Axis -11 degrees    Calculated R Axis -5 degrees    Calculated T Axis 26 degrees    Diagnosis       Normal sinus rhythm  Minimal voltage criteria for LVH, may be normal variant  Borderline ECG  When compared with ECG of 07-NOV-2012 21:27,  Questionable change in QRS axis     CBC WITH AUTOMATED DIFF    Collection Time: 05/12/17  3:48 PM   Result Value Ref Range    WBC 7.4 3.6 - 11.0 K/uL    RBC 3.89 3.80 - 5.20 M/uL    HGB 12.6 11.5 - 16.0 g/dL    HCT 36.6 35.0 - 47.0 %    MCV 94.1 80.0 - 99.0 FL    MCH 32.4 26.0 - 34.0 PG    MCHC 34.4 30.0 - 36.5 g/dL    RDW 12.3 11.5 - 14.5 %    PLATELET 944 256 - 641 K/uL    NEUTROPHILS 54 32 - 75 %    LYMPHOCYTES 29 12 - 49 %    MONOCYTES 9 5 - 13 %    EOSINOPHILS 7 0 - 7 %    BASOPHILS 1 0 - 1 %    ABS. NEUTROPHILS 4.1 1.8 - 8.0 K/UL    ABS. LYMPHOCYTES 2.1 0.8 - 3.5 K/UL    ABS. MONOCYTES 0.6 0.0 - 1.0 K/UL    ABS. EOSINOPHILS 0.5 (H) 0.0 - 0.4 K/UL    ABS. BASOPHILS 0.1 0.0 - 0.1 K/UL   METABOLIC PANEL, COMPREHENSIVE    Collection Time: 05/12/17  3:48 PM   Result Value Ref Range    Sodium 140 136 - 145 mmol/L    Potassium 3.8 3.5 - 5.1 mmol/L    Chloride 107 97 - 108 mmol/L    CO2 29 21 - 32 mmol/L    Anion gap 4 (L) 5 - 15 mmol/L    Glucose 79 65 - 100 mg/dL    BUN 6 6 - 20 MG/DL    Creatinine 0.68 0.55 - 1.02 MG/DL    BUN/Creatinine ratio 9 (L) 12 - 20      GFR est AA >60 >60 ml/min/1.73m2    GFR est non-AA >60 >60 ml/min/1.73m2    Calcium 8.4 (L) 8.5 - 10.1 MG/DL    Bilirubin, total 0.4 0.2 - 1.0 MG/DL    ALT (SGPT) 12 12 - 78 U/L    AST (SGOT) 9 (L) 15 - 37 U/L    Alk.  phosphatase 56 45 - 117 U/L    Protein, total 7.3 6.4 - 8.2 g/dL    Albumin 3.7 3.5 - 5.0 g/dL    Globulin 3.6 2.0 - 4.0 g/dL    A-G Ratio 1.0 (L) 1.1 - 2.2     MAGNESIUM    Collection Time: 05/12/17  3:48 PM   Result Value Ref Range    Magnesium 2.1 1.6 - 2.4 mg/dL   URINALYSIS W/ RFLX MICROSCOPIC    Collection Time: 05/12/17  3:48 PM Result Value Ref Range    Color YELLOW/STRAW      Appearance CLOUDY (A) CLEAR      Specific gravity 1.011 1.003 - 1.030      pH (UA) 7.5 5.0 - 8.0      Protein NEGATIVE  NEG mg/dL    Glucose NEGATIVE  NEG mg/dL    Ketone NEGATIVE  NEG mg/dL    Bilirubin NEGATIVE  NEG      Blood NEGATIVE  NEG      Urobilinogen 0.2 0.2 - 1.0 EU/dL    Nitrites NEGATIVE  NEG      Leukocyte Esterase TRACE (A) NEG      WBC 5-10 0 - 4 /hpf    RBC 0-5 0 - 5 /hpf    Epithelial cells FEW FEW /lpf    Bacteria 1+ (A) NEG /hpf   HCG URINE, QL    Collection Time: 05/12/17  3:48 PM   Result Value Ref Range    HCG urine, Ql. NEGATIVE  NEG     TROPONIN I    Collection Time: 05/12/17  3:48 PM   Result Value Ref Range    Troponin-I, Qt. <0.04 <0.05 ng/mL   CK    Collection Time: 05/12/17  3:48 PM   Result Value Ref Range     26 - 192 U/L       IMAGING RESULTS:    INDICATION: Chest pain, vaginal bleeding, anxiety      EXAM: PA and Lateral Chest Radiographs     COMPARISON: None     FINDINGS:     PA and lateral views of the chest demonstrate a normal cardiomediastinal  silhouette. The lungs are adequately expanded. There is no edema, effusion,  consolidation, or pneumothorax. The osseous structures are unremarkable.     IMPRESSION  IMPRESSION:  No acute process.          EXAM: CT HEAD WO CONT  INDICATION: pain, s/p trauma  Additional history: Patient appears anxious and tearful. Patient reports a small  amount on the right side of the forehead that seems to be getting worse and is  sore. Patient reports having struck head approximately 2 weeks previous with  headaches since that time. COMPARISON: None. .  TECHNIQUE:   Unenhanced CT of the head was performed using 5 mm images. Coronal and sagittal  reformats were produced. Brain and bone windows were generated. CT dose reduction was achieved through use of a standardized protocol tailored  for this examination and automatic exposure control for dose modulation. Yoan Basket   FINDINGS:  The ventricles and sulci are normal in size, shape and configuration and  midline. There is no significant white matter disease. There is no intracranial  hemorrhage, extra-axial collection, mass, mass effect or midline shift. The  basilar cisterns are open. No acute infarct is identified. The bone windows  demonstrate no abnormalities. The visualized portions of the paranasal sinuses  and mastoid air cells are clear. Bong Still IMPRESSION  IMPRESSION:   1. No evidence of acute intracranial abnormality by this modality. MEDICATIONS GIVEN:  Medications   sodium chloride 0.9 % bolus infusion 1,000 mL (0 mL IntraVENous IV Completed 5/12/17 1724)   ondansetron (ZOFRAN) injection 4 mg (4 mg IntraVENous Given 5/12/17 1547)   morphine injection 2 mg (2 mg IntraVENous Given 5/12/17 1547)   ketorolac (TORADOL) injection 30 mg (30 mg IntraVENous Given 5/12/17 1648)   diphenhydrAMINE (BENADRYL) injection 25 mg (25 mg IntraVENous Given 5/12/17 1646)   diazePAM (VALIUM) injection 2 mg (2 mg IntraVENous Given 5/12/17 1649)       IMPRESSION:  1. Concussion, without LOC, initial encounter    2. Atypical chest pain    3. Lightheadedness    4. Acute cystitis without hematuria        PLAN:  1. Current Discharge Medication List      START taking these medications    Details   HYDROcodone-acetaminophen (NORCO) 5-325 mg per tablet Take 1 Tab by mouth every four (4) hours as needed for Pain. Max Daily Amount: 6 Tabs. Qty: 20 Tab, Refills: 0      methocarbamol (ROBAXIN-750) 750 mg tablet Take 1 Tab by mouth four (4) times daily as needed. Qty: 20 Tab, Refills: 0      ondansetron (ZOFRAN ODT) 4 mg disintegrating tablet Take 1 Tab by mouth every eight (8) hours as needed for Nausea. Qty: 10 Tab, Refills: 0      cephALEXin (KEFLEX) 500 mg capsule Take 1 Cap by mouth three (3) times daily. Qty: 15 Cap, Refills: 0           2.    Follow-up Information     Follow up With Details Comments Contact Info    see a PCP or clinic Dr In 3 days As needed     [facility] EMERGENCY DEPT  If symptoms worsen [address] [address]  [phone]        Return to ED if worse     DISCHARGE NOTE:  5:35 PM  The patient is ready for discharge. The patient's signs, symptoms, diagnosis, and discharge instructions have been discussed and the patient has conveyed their understanding. The patient is to follow up as recommended or return to the ER should their symptoms worsen. Plan has been discussed and the patient is in agreement. This note is prepared by Wilda Shone, acting as Scribe for MD Ellie Richardson MD: The scribe's documentation has been prepared under my direction and personally reviewed by me in its entirety. I confirm that the note above accurately reflects all work, treatment, procedures, and medical decision making performed by me.

## 2017-05-12 NOTE — LETTER
[address] 
[facility] EMERGENCY DEPT 
[address] [place] [address] [address] [id number] [phone] Work/School Note Date: 5/12/2017 To Whom It May concern: 
 
Kory Abad was seen and treated today in the emergency room by the following provider(s): 
Attending Provider: Rosaura Crespo MD.   
 
Kory Abad may return to work on 05/15/2017. Sincerely, Rosaura Crespo MD

## 2017-05-14 LAB
ATRIAL RATE: 73 BPM
CALCULATED P AXIS, ECG09: -11 DEGREES
CALCULATED R AXIS, ECG10: -5 DEGREES
CALCULATED T AXIS, ECG11: 26 DEGREES
DIAGNOSIS, 93000: NORMAL
P-R INTERVAL, ECG05: 178 MS
Q-T INTERVAL, ECG07: 374 MS
QRS DURATION, ECG06: 68 MS
QTC CALCULATION (BEZET), ECG08: 412 MS
VENTRICULAR RATE, ECG03: 73 BPM

## 2017-05-21 ENCOUNTER — HOSPITAL ENCOUNTER (EMERGENCY)
Age: 32
Discharge: HOME | End: 2017-05-21
Attending: EMERGENCY MEDICINE | Admitting: EMERGENCY MEDICINE
Payer: MEDICAID

## 2017-05-21 ENCOUNTER — APPOINTMENT (OUTPATIENT)
Dept: GENERAL RADIOLOGY | Age: 32
End: 2017-05-21
Attending: PHYSICIAN ASSISTANT
Payer: MEDICAID

## 2017-05-21 VITALS
SYSTOLIC BLOOD PRESSURE: 107 MMHG | DIASTOLIC BLOOD PRESSURE: 64 MMHG | HEART RATE: 93 BPM | HEIGHT: 65 IN | BODY MASS INDEX: 21.79 KG/M2 | TEMPERATURE: 98.6 F | WEIGHT: 130.8 LBS | RESPIRATION RATE: 16 BRPM | OXYGEN SATURATION: 99 %
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
## 2017-05-21 DIAGNOSIS — S09.90XA MINOR HEAD INJURY, INITIAL ENCOUNTER: ICD-10-CM

## 2017-05-21 DIAGNOSIS — S16.1XXA STRAIN OF NECK, INITIAL ENCOUNTER: ICD-10-CM

## 2017-05-21 LAB
BASOPHILS # BLD AUTO: 0.1 K/UL (ref 0–0.1)
BASOPHILS # BLD: 1 % (ref 0–1)
EOSINOPHIL # BLD: 0.2 K/UL (ref 0–0.4)
EOSINOPHIL NFR BLD: 2 % (ref 0–7)
ERYTHROCYTE [DISTWIDTH] IN BLOOD BY AUTOMATED COUNT: 12.6 % (ref 11.5–14.5)
HCG SERPL-ACNC: <1 MIU/ML (ref 0–6)
HCG UR QL: NEGATIVE
HCT VFR BLD AUTO: 39.6 % (ref 35–47)
HGB BLD-MCNC: 13.3 G/DL (ref 11.5–16)
LYMPHOCYTES # BLD AUTO: 20 % (ref 12–49)
LYMPHOCYTES # BLD: 1.4 K/UL (ref 0.8–3.5)
MCH RBC QN AUTO: 31.4 PG (ref 26–34)
MCHC RBC AUTO-ENTMCNC: 33.6 G/DL (ref 30–36.5)
MCV RBC AUTO: 93.4 FL (ref 80–99)
MONOCYTES # BLD: 1.1 K/UL (ref 0–1)
MONOCYTES NFR BLD AUTO: 15 % (ref 5–13)
NEUTS SEG # BLD: 4.5 K/UL (ref 1.8–8)
NEUTS SEG NFR BLD AUTO: 62 % (ref 32–75)
PLATELET # BLD AUTO: 260 K/UL (ref 150–400)
RBC # BLD AUTO: 4.24 M/UL (ref 3.8–5.2)
WBC # BLD AUTO: 7.1 K/UL (ref 3.6–11)

## 2017-05-21 PROCEDURE — 81025 URINE PREGNANCY TEST: CPT

## 2017-05-21 PROCEDURE — 36415 COLL VENOUS BLD VENIPUNCTURE: CPT | Performed by: PHYSICIAN ASSISTANT

## 2017-05-21 PROCEDURE — 74011250636 HC RX REV CODE- 250/636: Performed by: PHYSICIAN ASSISTANT

## 2017-05-21 PROCEDURE — 96374 THER/PROPH/DIAG INJ IV PUSH: CPT

## 2017-05-21 PROCEDURE — 74011250637 HC RX REV CODE- 250/637: Performed by: PHYSICIAN ASSISTANT

## 2017-05-21 PROCEDURE — 71101 X-RAY EXAM UNILAT RIBS/CHEST: CPT

## 2017-05-21 PROCEDURE — 84702 CHORIONIC GONADOTROPIN TEST: CPT | Performed by: PHYSICIAN ASSISTANT

## 2017-05-21 PROCEDURE — 72050 X-RAY EXAM NECK SPINE 4/5VWS: CPT

## 2017-05-21 PROCEDURE — 85025 COMPLETE CBC W/AUTO DIFF WBC: CPT | Performed by: PHYSICIAN ASSISTANT

## 2017-05-21 PROCEDURE — 99284 EMERGENCY DEPT VISIT MOD MDM: CPT

## 2017-05-21 RX ORDER — ACETAMINOPHEN 325 MG/1
975 TABLET ORAL
Status: COMPLETED | OUTPATIENT
Start: 2017-05-21 | End: 2017-05-21

## 2017-05-21 RX ORDER — CYCLOBENZAPRINE HCL 10 MG
10 TABLET ORAL
Qty: 15 TAB | Refills: 0 | Status: SHIPPED | OUTPATIENT
Start: 2017-05-21 | End: 2019-08-05

## 2017-05-21 RX ORDER — NAPROXEN 500 MG/1
500 TABLET ORAL
Qty: 20 TAB | Refills: 0 | Status: SHIPPED | OUTPATIENT
Start: 2017-05-21 | End: 2019-08-05

## 2017-05-21 RX ORDER — KETOROLAC TROMETHAMINE 30 MG/ML
60 INJECTION, SOLUTION INTRAMUSCULAR; INTRAVENOUS
Status: DISCONTINUED | OUTPATIENT
Start: 2017-05-21 | End: 2017-05-21

## 2017-05-21 RX ORDER — KETOROLAC TROMETHAMINE 30 MG/ML
30 INJECTION, SOLUTION INTRAMUSCULAR; INTRAVENOUS
Status: COMPLETED | OUTPATIENT
Start: 2017-05-21 | End: 2017-05-21

## 2017-05-21 RX ADMIN — KETOROLAC TROMETHAMINE 30 MG: 30 INJECTION, SOLUTION INTRAMUSCULAR at 22:25

## 2017-05-21 RX ADMIN — ACETAMINOPHEN 975 MG: 325 TABLET, FILM COATED ORAL at 20:10
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
## 2017-05-21 NOTE — LETTER
[address] 
[address] [address] [address] [id number] 
[phone] Work/School Note Date: 5/21/2017 To Whom It May concern: 
 
Shaunna Diane was seen and treated today in the emergency room by the following provider(s): 
Attending Provider: Casa Gutierrez MD 
Physician Assistant: Rosy Blizzard, PA. Shaunna Diane may return to work on Tuesday. Sincerely, Rosy Blizzard, PA

## 2017-05-21 NOTE — ED NOTES
Patient reports normal menstrual cycles are 7 days in duration. Patient with two normal length cycles in March, 2 days of spotting in April and 2 days of spotting in May.

## 2017-05-22 NOTE — ED NOTES
I have reviewed discharge instructions with the patient. The patient verbalized understanding. Time allotted for questions. VSS. Pt ambulatory out of unit.
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
## 2019-07-09 ENCOUNTER — OFFICE VISIT (OUTPATIENT)
Dept: FAMILY MEDICINE CLINIC | Age: 34
End: 2019-07-09

## 2019-07-09 ENCOUNTER — HOSPITAL ENCOUNTER (OUTPATIENT)
Dept: LAB | Age: 34
Discharge: HOME | End: 2019-07-09
Payer: MEDICAID

## 2019-07-09 VITALS
HEIGHT: 65 IN | HEART RATE: 90 BPM | DIASTOLIC BLOOD PRESSURE: 59 MMHG | RESPIRATION RATE: 16 BRPM | WEIGHT: 152.2 LBS | BODY MASS INDEX: 25.36 KG/M2 | SYSTOLIC BLOOD PRESSURE: 104 MMHG | TEMPERATURE: 98.9 F | OXYGEN SATURATION: 96 %

## 2019-07-09 DIAGNOSIS — L70.0 ACNE VULGARIS: ICD-10-CM

## 2019-07-09 DIAGNOSIS — Z31.69 INFERTILITY COUNSELING: ICD-10-CM

## 2019-07-09 DIAGNOSIS — K21.00 GASTROESOPHAGEAL REFLUX DISEASE WITH ESOPHAGITIS: ICD-10-CM

## 2019-07-09 DIAGNOSIS — J45.20 MILD INTERMITTENT ASTHMA WITHOUT COMPLICATION: ICD-10-CM

## 2019-07-09 DIAGNOSIS — Z01.419 WELL WOMAN EXAM WITH ROUTINE GYNECOLOGICAL EXAM: Primary | ICD-10-CM

## 2019-07-09 DIAGNOSIS — Z00.00 ROUTINE GENERAL MEDICAL EXAMINATION AT A HEALTH CARE FACILITY: ICD-10-CM

## 2019-07-09 DIAGNOSIS — Z80.3 FAMILY HISTORY OF BREAST CANCER: ICD-10-CM

## 2019-07-09 PROCEDURE — 87624 HPV HI-RISK TYP POOLED RSLT: CPT

## 2019-07-09 PROCEDURE — 87491 CHLMYD TRACH DNA AMP PROBE: CPT

## 2019-07-09 PROCEDURE — 88175 CYTOPATH C/V AUTO FLUID REDO: CPT

## 2019-07-09 RX ORDER — ALBUTEROL SULFATE 90 UG/1
2 AEROSOL, METERED RESPIRATORY (INHALATION)
Qty: 2 INHALER | Refills: 3 | Status: SHIPPED | OUTPATIENT
Start: 2019-07-09 | End: 2021-02-11 | Stop reason: SDUPTHER

## 2019-07-09 RX ORDER — CLINDAMYCIN PHOSPHATE AND BENZOYL PEROXIDE 10; 50 MG/G; MG/G
GEL TOPICAL
Qty: 1 TUBE | Refills: 0 | Status: SHIPPED | OUTPATIENT
Start: 2019-07-09 | End: 2019-08-05

## 2019-07-09 RX ORDER — ALBUTEROL SULFATE 0.83 MG/ML
2.5 SOLUTION RESPIRATORY (INHALATION)
Qty: 30 NEBULE | Refills: 1 | Status: SHIPPED | OUTPATIENT
Start: 2019-07-09 | End: 2019-12-30 | Stop reason: SDUPTHER

## 2019-07-09 RX ORDER — FAMOTIDINE 40 MG/1
40 TABLET, FILM COATED ORAL
Qty: 30 TAB | Refills: 3 | Status: SHIPPED | OUTPATIENT
Start: 2019-07-09 | End: 2019-12-30 | Stop reason: SDUPTHER

## 2019-07-09 NOTE — PROGRESS NOTES
[facility] ER Note    Cristiana Armenta is a 29 y.o. female who was seen in clinic today (7/9/2019). Subjective:  Cardiovascular Review:  The patient has no known cardiovascular conditions. Diet and Lifestyle: generally follows a low fat low cholesterol diet, generally follows a low sodium diet, sedentary, smoker 1 pack per week. Pertinent ROS: no TIA's, no chest pain on exertion, no dyspnea on exertion, no swelling of ankles. Patient has intermittent asthma with exacerbated with exercise. Requests treatment for facial acne. Using OTC products without relief. Reports acid reflux symptoms with eating tomatoes. Patient not currently seen by GYN routinely. Reports a family h/o breast cancer with aunt and maternal GM. Patient reports inability to conceive despite 5 months of trying. Reports regular monthly menstrual cycles. Prior to Admission medications    Medication Sig Start Date End Date Taking? Authorizing Provider   albuterol (PROAIR HFA) 90 mcg/actuation inhaler Take 2 Puffs by inhalation every four (4) hours as needed for Wheezing. 7/9/19  Yes Octavio Sainz NP   albuterol (PROVENTIL VENTOLIN) 2.5 mg /3 mL (0.083 %) nebu 3 mL by Nebulization route every four (4) hours as needed (wheezing). 7/9/19  Yes Octavio Sainz NP   clindamycin-benzoyl Peroxide (DUAC) 1.2 %(1 % base) -5 % SR topical gel Apply  to affected area nightly. 7/9/19  Yes Gael Carr NP   famotidine (PEPCID) 40 mg tablet Take 1 Tab by mouth daily as needed (acid reflux).  7/9/19  Yes Gael Carr NP   naproxen (NAPROSYN) 500 mg tablet Take 1 Tab by mouth every twelve (12) hours as needed for Pain. 5/21/17   TONIE Soler   cyclobenzaprine (FLEXERIL) 10 mg tablet Take 1 Tab by mouth three (3) times daily as needed for Muscle Spasm(s). 5/21/17   TONIE Soler   ondansetron (ZOFRAN ODT) 4 mg disintegrating tablet Take 1 Tab by mouth every eight (8) hours as needed for Nausea. 5/12/17   Dru Cheng MD          Allergies   Allergen Reactions    Tomato Hives           Review of Systems   Constitutional: Negative for malaise/fatigue and weight loss. HENT: Negative for hearing loss. Eyes: Negative for blurred vision. Respiratory: Negative for shortness of breath. Cardiovascular: Negative for chest pain, palpitations and leg swelling. Gastrointestinal: Negative for abdominal pain, constipation, diarrhea and heartburn. Genitourinary: Negative for frequency and urgency. Musculoskeletal: Negative for back pain, joint pain and myalgias. Neurological: Negative for dizziness, weakness and headaches. Endo/Heme/Allergies: Does not bruise/bleed easily. Psychiatric/Behavioral: Negative for depression. Objective:   Physical Exam   Constitutional: She is oriented to person, place, and time. She appears well-developed and well-nourished. No distress. HENT:   Right Ear: Tympanic membrane and ear canal normal.   Left Ear: Tympanic membrane and ear canal normal.   Nose: No mucosal edema. Right sinus exhibits no maxillary sinus tenderness and no frontal sinus tenderness. Left sinus exhibits no maxillary sinus tenderness and no frontal sinus tenderness. Mouth/Throat: Oropharynx is clear and moist.   Eyes: Pupils are equal, round, and reactive to light. Conjunctivae and EOM are normal.   Neck: Normal range of motion. Neck supple. No JVD present. Carotid bruit is not present. No thyromegaly present. Cardiovascular: Normal rate, regular rhythm, normal heart sounds and intact distal pulses. Exam reveals no gallop and no friction rub. No murmur heard. Pulmonary/Chest: Effort normal and breath sounds normal. No respiratory distress. She has no decreased breath sounds. She has no wheezes. She has no rhonchi. Abdominal: Soft. Bowel sounds are normal. She exhibits no distension. There is no tenderness.    Genitourinary: Vagina normal and uterus normal. Cervix exhibits no motion tenderness. Right adnexum displays no mass and no tenderness. Left adnexum displays no mass and no tenderness. No vaginal discharge found. Musculoskeletal: She exhibits no edema. Lymphadenopathy:     She has no cervical adenopathy. Neurological: She is alert and oriented to person, place, and time. Psychiatric: She has a normal mood and affect. Her behavior is normal.   Nursing note and vitals reviewed. Visit Vitals  /59 (BP 1 Location: Right arm, BP Patient Position: Sitting)   Pulse 90   Temp 98.9 °F (37.2 °C) (Oral)   Resp 16   Ht 5' 5\" (1.651 m)   Wt 152 lb 3.2 oz (69 kg)   SpO2 96%   BMI 25.33 kg/m²       Assessment & Plan:  Diagnoses and all orders for this visit:    1. Well woman exam with routine gynecological exam  -     PAP IG, CT-NG-TV, APTIMA HPV AND [address] 65/38,91([id number]); Future    2. Mild intermittent asthma without complication  Stable, no changes to current therapy  -     albuterol (PROAIR HFA) 90 mcg/actuation inhaler; Take 2 Puffs by inhalation every four (4) hours as needed for Wheezing.  -     albuterol (PROVENTIL VENTOLIN) 2.5 mg /3 mL (0.083 %) nebu; 3 mL by Nebulization route every four (4) hours as needed (wheezing). 3. Acne vulgaris  -     Begin clindamycin-benzoyl Peroxide (DUAC) 1.2 %(1 % base) -5 % SR topical gel; Apply  to affected area nightly. 4. Gastroesophageal reflux disease with esophagitis  Reviewed diet and lifestyle changes. -     Begin famotidine (PEPCID) 40 mg tablet; Take 1 Tab by mouth daily as needed (acid reflux). 5. Infertility counseling  Recommended tracking ovulation cycle. Information given on [facility] infertility clinic. Will defer labs to specialist.   -     REFERRAL TO INFERTILITY    6. Family history of breast cancer  Continue month;ly breast exams. Begin mammograms at age 37 yo per guidelines. I have discussed the diagnosis with the patient and the intended plan as seen in the above orders.   The patient has received an after-visit summary along with patient information handout. I have discussed medication side effects and warnings with the patient as well. Follow-up and Dispositions    · Return in about 1 year (around 7/9/2020) for Annual Exam - 30 minutes.            Michael Dial NP

## 2019-07-11 LAB
C TRACH RRNA SPEC QL NAA+PROBE: NEGATIVE
N GONORRHOEA RRNA SPEC QL NAA+PROBE: NEGATIVE
SPECIMEN SOURCE: NORMAL
T VAGINALIS RRNA SPEC QL NAA+PROBE: NEGATIVE

## 2019-07-12 DIAGNOSIS — N76.0 BACTERIAL VAGINOSIS: Primary | ICD-10-CM

## 2019-07-12 DIAGNOSIS — B96.89 BACTERIAL VAGINOSIS: Primary | ICD-10-CM

## 2019-07-12 RX ORDER — CLINDAMYCIN HYDROCHLORIDE 300 MG/1
300 CAPSULE ORAL 2 TIMES DAILY
Qty: 14 CAP | Refills: 0 | Status: SHIPPED | OUTPATIENT
Start: 2019-07-12 | End: 2019-07-19

## 2019-07-12 NOTE — PROGRESS NOTES
Patient's Pap was negative. Culture was negative for the presence of STDs. This includes HPV, gonorrhea, chlamydia and trichomonas. There was an increase in bacteria however indicating she has bacterial vaginosis (not a STD). I will send in a antibiotic to treat this.

## 2019-07-12 NOTE — PROGRESS NOTES
[phone] attempted to call patient no answer left message to call me back in regards to her PAP results

## 2019-07-12 NOTE — PROGRESS NOTES
[phone] Spoke to patient Identified pt with two pt identifiers (Name @ )  Notified patient of her lab results and understand

## 2019-08-02 ENCOUNTER — OFFICE VISIT (OUTPATIENT)
Dept: URGENT CARE | Age: 34
End: 2019-08-02

## 2019-08-02 VITALS
OXYGEN SATURATION: 98 % | WEIGHT: 154.5 LBS | BODY MASS INDEX: 25.74 KG/M2 | SYSTOLIC BLOOD PRESSURE: 110 MMHG | TEMPERATURE: 98.4 F | RESPIRATION RATE: 18 BRPM | DIASTOLIC BLOOD PRESSURE: 54 MMHG | HEIGHT: 65 IN | HEART RATE: 78 BPM

## 2019-08-02 DIAGNOSIS — L08.9 SKIN INFECTION: Primary | ICD-10-CM

## 2019-08-02 RX ORDER — CEPHALEXIN 500 MG/1
500 CAPSULE ORAL 2 TIMES DAILY
Qty: 14 CAP | Refills: 0 | Status: SHIPPED | OUTPATIENT
Start: 2019-08-02 | End: 2019-08-05

## 2019-08-02 NOTE — PATIENT INSTRUCTIONS
Rash: Care Instructions  Your Care Instructions  A rash is any irritation or inflammation of the skin. Rashes have many possible causes, including allergy, infection, illness, heat, and emotional stress. Follow-up care is a key part of your treatment and safety. Be sure to make and go to all appointments, and call your doctor if you are having problems. It's also a good idea to know your test results and keep a list of the medicines you take. How can you care for yourself at home? · Wash the area with water only. Soap can make dryness and itching worse. Pat dry. · Put cold, wet cloths on the rash to reduce itching. · Keep cool, and stay out of the sun. · Leave the rash open to the air as much of the time as possible. · Sometimes petroleum jelly (Vaseline) can help relieve the discomfort caused by a rash. A moisturizing lotion, such as Cetaphil, also may help. Calamine lotion may help for rashes caused by contact with something (such as a plant or soap) that irritated the skin. Use it 3 or 4 times a day. · If your doctor prescribed a cream, use it as directed. If your doctor prescribed medicine, take it exactly as directed. · If your rash itches so badly that it interferes with your normal activities, take an over-the-counter antihistamine, such as diphenhydramine (Benadryl) or loratadine (Claritin). Read and follow all instructions on the label. When should you call for help? Call your doctor now or seek immediate medical care if:    · You have signs of infection, such as:  ? Increased pain, swelling, warmth, or redness. ? Red streaks leading from the area. ? Pus draining from the area. ? A fever.     · You have joint pain along with the rash.    Watch closely for changes in your health, and be sure to contact your doctor if:    · Your rash is changing or getting worse.  For example, call if you have pain along with the rash, the rash is spreading, or you have new blisters.     · You do not get better after 1 week. Where can you learn more? Go to http://blane-danna.info/. Enter H219 in the search box to learn more about \"Rash: Care Instructions. \"  Current as of: April 1, 2019  Content Version: 12.1  © 2228-1082 [employer]wise[employer]. Care instructions adapted under license by [employer] (which disclaims liability or warranty for this information). If you have questions about a medical condition or this instruction, always ask your healthcare professional. [address] any warranty or liability for your use of this information.

## 2019-08-02 NOTE — PROGRESS NOTES
Finger Swelling   This is a new problem. The current episode started more than 2 days ago. Episode frequency: throbbing around cuticle. The problem has not changed since onset. Pertinent negatives include no chest pain and no shortness of breath. Nothing aggravates the symptoms. Nothing relieves the symptoms. She has tried nothing for the symptoms. Patient wears acrylic nails and having pain around cuticle of left middle nail. Denies being able to move finger. Past Medical History:   Diagnosis Date    Asthma     Ovarian cyst         No past surgical history on file. Family History   Problem Relation Age of Onset    Hypertension Mother     No Known Problems Father     Hypertension Sister     No Known Problems Brother         Social History     Socioeconomic History    Marital status: SINGLE     Spouse name: Not on file    Number of children: Not on file    Years of education: Not on file    Highest education level: Not on file   Occupational History    Not on file   Social Needs    Financial resource strain: Not on file    Food insecurity:     Worry: Not on file     Inability: Not on file    Transportation needs:     Medical: Not on file     Non-medical: Not on file   Tobacco Use    Smoking status: Current Every Day Smoker     Packs/day: 0.25     Years: 10.00     Pack years: 2.50    Smokeless tobacco: Current User    Tobacco comment: 1 pACK PER WEEK   Substance and Sexual Activity    Alcohol use: Yes     Comment: socially    Drug use: Yes     Types: Marijuana     Comment: ON OCC.     Sexual activity: Yes     Partners: Male     Birth control/protection: None   Lifestyle    Physical activity:     Days per week: Not on file     Minutes per session: Not on file    Stress: Not on file   Relationships    Social connections:     Talks on phone: Not on file     Gets together: Not on file     Attends Alevism service: Not on file     Active member of club or organization: Not on file Attends meetings of clubs or organizations: Not on file     Relationship status: Not on file    Intimate partner violence:     Fear of current or ex partner: Not on file     Emotionally abused: Not on file     Physically abused: Not on file     Forced sexual activity: Not on file   Other Topics Concern    Not on file   Social History Narrative    Not on file                ALLERGIES: Tomato    Review of Systems   Constitutional: Negative for chills, fatigue and fever. HENT: Negative. Eyes: Negative. Respiratory: Negative for cough and shortness of breath. Cardiovascular: Negative for chest pain. Gastrointestinal: Negative. Skin:        Skin around left middle finger cuticle upon touching. Vitals:    08/02/19 1334   BP: 110/54   Pulse: 78   Resp: 18   Temp: 98.4 °F (36.9 °C)   SpO2: 98%   Weight: 154 lb 8 oz (70.1 kg)   Height: 5' 5\" (1.651 m)       Physical Exam   Constitutional: She is oriented to person, place, and time. She appears well-developed and well-nourished. HENT:   Right Ear: External ear normal.   Left Ear: External ear normal.   Neck: Normal range of motion. Cardiovascular: Normal rate, regular rhythm and normal heart sounds. Pulmonary/Chest: Effort normal and breath sounds normal.   Musculoskeletal: Normal range of motion. Neurological: She is alert and oriented to person, place, and time. Skin: Skin is warm. Erythema noted around cuticle of left middle finger with hardened skin. Upon palpation and assessment no pus noted. Unable to visualize nailbed due to acrylic on nail. MDM     Differential Diagnosis; Clinical Impression; Plan:     (L08.9) Skin infection  (primary encounter diagnosis)  Orders Placed This Encounter      cephALEXin (KEFLEX) 500 mg capsule          Sig: Take 1 Cap by mouth two (2) times a day for 7 days. Dispense:  14 Cap          Refill:  0    Advised patient to remove acrylic on left middle finger. Take medication as ordered. If symptoms persist RTO or followup with PCP. The patients condition was discussed with the patient and they understand. If signs and symptoms become worse the pt is to go to the ER. The patient is to take medications as prescribed. AVS given with patient instructions upon discharge.                   Procedures

## 2019-08-05 ENCOUNTER — OFFICE VISIT (OUTPATIENT)
Dept: URGENT CARE | Age: 34
End: 2019-08-05

## 2019-08-05 VITALS
SYSTOLIC BLOOD PRESSURE: 104 MMHG | RESPIRATION RATE: 18 BRPM | HEART RATE: 60 BPM | TEMPERATURE: 97.9 F | DIASTOLIC BLOOD PRESSURE: 58 MMHG | OXYGEN SATURATION: 99 %

## 2019-08-05 DIAGNOSIS — L08.9 SKIN INFECTION: ICD-10-CM

## 2019-08-05 DIAGNOSIS — L03.011 PARONYCHIA OF FINGER OF RIGHT HAND: Primary | ICD-10-CM

## 2019-08-05 RX ORDER — IBUPROFEN 800 MG/1
800 TABLET ORAL
Qty: 20 TAB | Refills: 0 | Status: SHIPPED | OUTPATIENT
Start: 2019-08-05 | End: 2020-07-01 | Stop reason: ALTCHOICE

## 2019-08-05 RX ORDER — CEPHALEXIN 500 MG/1
500 CAPSULE ORAL 4 TIMES DAILY
Qty: 28 CAP | Refills: 0 | Status: SHIPPED | OUTPATIENT
Start: 2019-08-05 | End: 2019-08-12

## 2019-08-05 NOTE — PATIENT INSTRUCTIONS
Paronychia: Care Instructions  Your Care Instructions  Paronychia (say \"xojv-bp-FD-arlene-[id number]\") is an infection of the skin around a fingernail or toenail. It happens when germs enter through a break in the skin. The doctor may have made a small cut in the infected area to drain the pus. Most cases of paronychia improve in a few days. But watch your symptoms and follow your doctor's advice. Though rare, a mild case can turn into something more serious and infect your entire finger or toe. Also, it is possible for an infection to return. Follow-up care is a key part of your treatment and safety. Be sure to make and go to all appointments, and call your doctor if you are having problems. It's also a good idea to know your test results and keep a list of the medicines you take. How can you care for yourself at home? · If your doctor told you how to care for your infected nail, follow the doctor's instructions. If you did not get instructions, follow this general advice:  ? Wash the area with clean water 2 times a day. Don't use hydrogen peroxide or alcohol, which can slow healing. ? You may cover the area with a thin layer of petroleum jelly, such as Vaseline, and a nonstick bandage. ? Apply more petroleum jelly and replace the bandage as needed. · If your doctor prescribed antibiotics, take them as directed. Do not stop taking them just because you feel better. You need to take the full course of antibiotics. · Take an over-the-counter pain medicine, such as acetaminophen (Tylenol), ibuprofen (Advil, Motrin), or naproxen (Aleve). Read and follow all instructions on the label. · Do not take two or more pain medicines at the same time unless the doctor told you to. Many pain medicines have acetaminophen, which is Tylenol. Too much acetaminophen (Tylenol) can be harmful. · Prop up the toe or finger so that it is higher than the level of your heart. This will help with pain and swelling. · Apply heat.  Put a warm water bottle, heating pad set on low, or warm cloth on your finger or toe. Do not go to sleep with a heating pad on your skin. · Soak the area in warm water twice a day for 15 minutes each time. After soaking, dry the area well and apply a thin layer of petroleum jelly, such as Vaseline. Put on a new bandage. When should you call for help? Call your doctor now or seek immediate medical care if:    · You have signs of new or worsening infection, such as:  ? Increased pain, swelling, warmth, or redness. ? Red streaks leading from the infected skin. ? Pus draining from the area. ? A fever.    Watch closely for changes in your health, and be sure to contact your doctor if:    · You do not get better as expected. Where can you learn more? Go to http://blane-danna.info/. Enter C435 in the search box to learn more about \"Paronychia: Care Instructions. \"  Current as of: April 1, 2019  Content Version: 12.1  © 1494-8451 Healthwise, Incorporated. Care instructions adapted under license by [employer] (which disclaims liability or warranty for this information). If you have questions about a medical condition or this instruction, always ask your healthcare professional. [address] any warranty or liability for your use of this information.

## 2019-08-05 NOTE — PROGRESS NOTES
Finger Pain   This is a new problem. The current episode started more than 2 days ago. The problem occurs constantly. The problem has been rapidly worsening. Associated symptoms comments: Rt middle finger tip pain/ swelling   Started on keflex x 2 days but not improved  Developing abscess along lateral edge of nail . Nothing aggravates the symptoms. Nothing relieves the symptoms. She has tried nothing for the symptoms. Past Medical History:   Diagnosis Date    Asthma     Ovarian cyst         History reviewed. No pertinent surgical history. Family History   Problem Relation Age of Onset    Hypertension Mother     No Known Problems Father     Hypertension Sister     No Known Problems Brother         Social History     Socioeconomic History    Marital status: SINGLE     Spouse name: Not on file    Number of children: Not on file    Years of education: Not on file    Highest education level: Not on file   Occupational History    Not on file   Social Needs    Financial resource strain: Not on file    Food insecurity:     Worry: Not on file     Inability: Not on file    Transportation needs:     Medical: Not on file     Non-medical: Not on file   Tobacco Use    Smoking status: Current Every Day Smoker     Packs/day: 0.25     Years: 10.00     Pack years: 2.50    Smokeless tobacco: Current User    Tobacco comment: 1 pACK PER WEEK   Substance and Sexual Activity    Alcohol use: Yes     Comment: socially    Drug use: Yes     Types: Marijuana     Comment: ON OCC.     Sexual activity: Yes     Partners: Male     Birth control/protection: None   Lifestyle    Physical activity:     Days per week: Not on file     Minutes per session: Not on file    Stress: Not on file   Relationships    Social connections:     Talks on phone: Not on file     Gets together: Not on file     Attends Taoist service: Not on file     Active member of club or organization: Not on file     Attends meetings of clubs or organizations: Not on file     Relationship status: Not on file    Intimate partner violence:     Fear of current or ex partner: Not on file     Emotionally abused: Not on file     Physically abused: Not on file     Forced sexual activity: Not on file   Other Topics Concern    Not on file   Social History Narrative    Not on file                ALLERGIES: Tomato    Review of Systems   All other systems reviewed and are negative. Vitals:    08/05/19 1021   BP: 104/58   Pulse: 60   Resp: 18   Temp: 97.9 °F (36.6 °C)   SpO2: 99%       Physical Exam   Musculoskeletal:        Right hand: She exhibits tenderness (on tip of middle finger ) and swelling (abscess + ). Hands:  Nursing note and vitals reviewed. MDM    Procedures        ICD-10-CM ICD-9-CM    1. Paronychia of finger of right hand L03.011 681.02     middle finger- with abscess   2. Skin infection L08.9 686.9 cephALEXin (KEFLEX) 500 mg capsule     Medications Ordered Today   Medications    cephALEXin (KEFLEX) 500 mg capsule     Sig: Take 1 Cap by mouth four (4) times daily for 7 days. Dispense:  28 Cap     Refill:  0    ibuprofen (MOTRIN) 800 mg tablet     Sig: Take 1 Tab by mouth every eight (8) hours as needed for Pain. Dispense:  20 Tab     Refill:  0     No results found for any visits on 08/05/19. The patients condition was discussed with the patient and they understand. The patient is to follow up with primary care doctor. If signs and symptoms become worse the pt is to go to the ER. The patient is to take medications as prescribed.

## 2019-08-05 NOTE — LETTER
[address] [address] 
[phone] Work/School Note Date: 8/5/2019 To Whom It May concern: 
 
Micki Nesbitt was seen and treated today in the urgent care center by the following provider: Dr. Yajaira Hill. Micki Nesbitt may return to work 8/6/19. Sincerely, Shiloh Olson RN

## 2019-10-02 NOTE — PATIENT INSTRUCTIONS
10/2/2019      RE: Elver Dawson  [address]  [facility] [address]                                         Outpatient initial consultation    Consultation requested by Cleopatra Marie    Diagnoses:  Patient Active Problem List   Diagnosis     Hearing loss, left      , gestational age 36 completed weeks     Milk protein intolerance     Gastroesophageal reflux disease, esophagitis presence not specified     Bronchiolitis         HPI: Elver is a 13 month old male with hx of recurrent vomiting and regurgitation.    Elver born at 36 weeks GA after pregnancy complicated by pre-eclampsia via  c/s repeat.     He was initially nursing, later started on alimentum (because mom used it for other kids), started to have blood in the stool, thus switched to neocate.    Currently he is taking 20 rex Neocate 5 oz x4 a day. He is taking whole milk yogurt, crackers with cheese, blueberries, grapes. Previously thickened with gel mix and lately with cereal     He reacted to eggs - spitted up x2, caserol or any other foods with milk, he spits up or regurgitates, but no whole milk yogurt.     He demonstrated adequate weight gain and growth.     He had x4 pneumonia related admissions to [facility], later had VSSS - wnl.     He did not work with speech therapist and does not like certain tastes or textures.   He has prominent tongue trust according to mom.         Review of Systems:      Constitutional: Negative for , unexplained fevers, anorexia, weight loss, growth decelartion, fatigue/weakness  Eyes:  Negative for:, redness, eye pain, scleral icterus and photophobia  HEENT: Negative for:, hearing loss, oral aphthous ulcers, epistaxis  Respiratory: Negative for:, shortness of breath, cough, wheezing  Cardiac: Negative for:, chest pain, palpitations  Gastrointestinal: Negative for:, abdominal pain, abdominal distension, heartburn, regurgitation, nausea, vomiting, hematemesis, green/bilous vomitng,  Well Visit, Ages 25 to 48: Care Instructions  Your Care Instructions    Physical exams can help you stay healthy. Your doctor has checked your overall health and may have suggested ways to take good care of yourself. He or she also may have recommended tests. At home, you can help prevent illness with healthy eating, regular exercise, and other steps. Follow-up care is a key part of your treatment and safety. Be sure to make and go to all appointments, and call your doctor if you are having problems. It's also a good idea to know your test results and keep a list of the medicines you take. How can you care for yourself at home? · Reach and stay at a healthy weight. This will lower your risk for many problems, such as obesity, diabetes, heart disease, and high blood pressure. · Get at least 30 minutes of physical activity on most days of the week. Walking is a good choice. You also may want to do other activities, such as running, swimming, cycling, or playing tennis or team sports. Discuss any changes in your exercise program with your doctor. · Do not smoke or allow others to smoke around you. If you need help quitting, talk to your doctor about stop-smoking programs and medicines. These can increase your chances of quitting for good. · Talk to your doctor about whether you have any risk factors for sexually transmitted infections (STIs). Having one sex partner (who does not have STIs and does not have sex with anyone else) is a good way to avoid these infections. · Use birth control if you do not want to have children at this time. Talk with your doctor about the choices available and what might be best for you. · Protect your skin from too much sun. When you're outdoors from 10 a.m. to 4 p.m., stay in the shade or cover up with clothing and a hat with a wide brim. Wear sunglasses that block UV rays. Even when it's cloudy, put broad-spectrum sunscreen (SPF 30 or higher) on any exposed skin.   · See a dentist one or two times a year for checkups and to have your teeth cleaned. · Wear a seat belt in the car. · Drink alcohol in moderation, if at all. That means no more than 2 drinks a day for men and 1 drink a day for women. Follow your doctor's advice about when to have certain tests. These tests can spot problems early. For everyone  · Cholesterol. Have the fat (cholesterol) in your blood tested after age 21. Your doctor will tell you how often to have this done based on your age, family history, or other things that can increase your risk for heart disease. · Blood pressure. Have your blood pressure checked during a routine doctor visit. Your doctor will tell you how often to check your blood pressure based on your age, your blood pressure results, and other factors. · Vision. Talk with your doctor about how often to have a glaucoma test.  · Diabetes. Ask your doctor whether you should have tests for diabetes. · Colon cancer. Have a test for colon cancer at age 48. You may have one of several tests. If you are younger than 48, you may need a test earlier if you have any risk factors. Risk factors include whether you already had a precancerous polyp removed from your colon or whether your parent, brother, sister, or child has had colon cancer. For women  · Breast exam and mammogram. Talk to your doctor about when you should have a clinical breast exam and a mammogram. Medical experts differ on whether and how often women under 50 should have these tests. Your doctor can help you decide what is right for you. · Pap test and pelvic exam. Begin Pap tests at age 24. A Pap test is the best way to find cervical cancer. The test often is part of a pelvic exam. Ask how often to have this test.  · Tests for sexually transmitted infections (STIs). Ask whether you should have tests for STIs. You may be at risk if you have sex with more than one person, especially if your partners do not wear condoms.   For dysphagia, diarrhea, encopresis, painful defecation, feeling of incomplete evacuation, blood in the stool, jaundice, Positive for: reflux, constipation  Genitourinary: Negative for: , dysuria, urgency, frequency, enuresis, hematuria, flank pain, nocturnal enuresis, diurnal enuresis  Skin: Negative for:  , rash, itching  Hematologic: Negative for:, bleeding gums, lymphadenopathy  Allergic/Immunologic: Negative for:, recurrent bacterial infections  Endocrine: Negative for: , hair loss  Musculoskeletal: Negative for:, joint pain, joint swelling, joint redness, muscle weaknes  Neurologic: Negative for:, headache, dizziness, syncope, seizures, coordination problems  Psychiatric/Developemental: Negative for:, anxiety, depression, fluctuating mood, ADHD, developemental problems, autism    Allergies: Isoflavones and Milk-related compounds    Current Outpatient Medications   Medication Sig     albuterol (ACCUNEB) 1.25 MG/3ML nebulizer solution      Budesonide (PULMICORT IN) Inhale into the lungs daily     ranitidine (ZANTAC) 15 MG/ML syrup Take 3 mLs (45 mg) by mouth 2 times daily     clotrimazole (LOTRIMIN) 1 % external cream Apply topically 2 times daily     No current facility-administered medications for this visit.          Past Medical History: I have reviewed this patient's past medical history and updated as appropriate.     Past Medical History:   Diagnosis Date     Acute viral bronchiolitis 2018    Hospitalized at [facility] for apnea related to bronchiolitis     Bronchiolitis 2018    Hospitalized again at [facility]          Past Surgical History: I have reviewed this patient's past medical history and updated as appropriate.     No past surgical history on file.      Family History:     Negative for:  Cystic fibrosis, Celiac disease, Crohn's disease, Ulcerative Colitis, Polyposis syndromes, Hepatitis, Other liver disorders, Pancreatitis, GI cancers in young family members, Thyroid disease,  "Insulin dependent diabetes, Sick contacts and Recent travel history    No family history on file.    Social History: Lives with mother and father, has 7 siblings.    Physical exam:    Vital Signs: Ht 0.73 m (2' 4.74\")   Wt 8.575 kg (18 lb 14.5 oz)   BMI 16.09 kg/m   . (5 %ile based on WHO (Boys, 0-2 years) Length-for-age data based on Length recorded on 10/2/2019. 9 %ile based on WHO (Boys, 0-2 years) weight-for-age data based on Weight recorded on 10/2/2019. Body mass index is 16.09 kg/m . 33 %ile based on WHO (Boys, 0-2 years) BMI-for-age based on body measurements available as of 10/2/2019.)  Constitutional: alert and no distress  Head:  Normocephalic. No masses, lesions, tenderness or abnormalities  Neck: Neck supple.  EYE: BOAZ, EOMI  ENT: Ears: normal position, Nose: no discharge and Mouth: normal, moist mucous membranes  Cardiovascular: Heart: Regular rate and rhythm  Respiratory: Lungs clear to auscultation bilaterally.  Gastrointestinal: Abdomen:, soft, non-tender, nondistended, normal bowel sounds, no hepatomegaly, no splenomegaly  Rectal exam: Deferred  Musculoskeletal: extremities warm, well perfused,  no clubbing  Skin: no suspicious lesions or rashes  Neurologic: negative  Hematologic/Lymphatic/Immunologic: no cervical lymphadenopathy       I personally reviewed results of laboratory evaluation, imaging studies and past medical records that were available during this outpatient visit:    Results for orders placed or performed in visit on 03/25/19   IgG   Result Value Ref Range     210 - 870 mg/dL   IgM   Result Value Ref Range    IGM 40 30 - 120 mg/dL   IgA   Result Value Ref Range    IGA 12 10 - 90 mg/dL        Assessment and Plan:     Spitting up infant  Oral aversion    Unlikely food sensitivity, however we'll have screening labs today.  Symptoms most likely related to oral aversion/sensitivity  Recommended evaluation and treatment by a speech therapist  RD consultation today - OK to " men  · Tests for sexually transmitted infections (STIs). Ask whether you should have tests for STIs. You may be at risk if you have sex with more than one person, especially if you do not wear a condom. · Testicular cancer exam. Ask your doctor whether you should check your testicles regularly. · Prostate exam. Talk to your doctor about whether you should have a blood test (called a PSA test) for prostate cancer. Experts differ on whether and when men should have this test. Some experts suggest it if you are older than 39 and are -American or have a father or brother who got prostate cancer when he was younger than 72. When should you call for help? Watch closely for changes in your health, and be sure to contact your doctor if you have any problems or symptoms that concern you. Where can you learn more? Go to http://blane-danna.info/. Enter P072 in the search box to learn more about \"Well Visit, Ages 25 to 48: Care Instructions. \"  Current as of: March 28, 2018  Content Version: 11.9  © 5363-7829 [employer]. Care instructions adapted under license by [employer] (which disclaims liability or warranty for this information). If you have questions about a medical condition or this instruction, always ask your healthcare professional. [address] any warranty or liability for your use of this information. transition from Neocate to whole milk with close follow up of weight gain    Orders Placed This Encounter   Procedures     Comprehensive metabolic panel     CBC with platelets differential     CRP inflammation     TSH with free T4 reflex     Tissue transglutaminase sandrita IgA and IgG     IgA     Iron and iron binding capacity     Ferritin     Vitamin D Deficiency     Allergen egg white IgE     Allergen milk IgE     Allergen codfish IgE     Allergen wheat IgE     Allergen barley IgE     Allergen corn IgE     Allergen rice IgE     Allergen pea IgE     Allergen peanut IgE     Allergen soybean IgE     Allergen crab IgE     Allergen shrimp IgE     Allergen tomato IgE     Allergen orange IgE     Allergen tuna IgE     SPEECH THERAPY REFERRAL         Follow up: Return to the clinic in 3 months or earlier should patient become symptomatic.    Marcus Bautista M.D.   Director, [facility]   , Pediatric Gastroenterology  [facility]  Delivery Code #[id number]  [facility] [address]  [email or web address]  [address] [place] [address]  Appt     [phone]  Nurse  [phone]      Fax      [phone]    [facility]  [address] [address]  Appt     [phone]  Nurse   [phone]       Fax:      [phone]    [facility]  [address] [place] [address]  Appt      [phone]  Nurse    [phone]  Fax        [phone]    CC  Patient Care Team:  Cleopatra Marie MD as PCP - General (Pediatrics)  Cleopatra Marie MD as Assigned PCP                      Marcus Bautista MD

## 2019-11-14 ENCOUNTER — OFFICE VISIT (OUTPATIENT)
Dept: FAMILY MEDICINE CLINIC | Age: 34
End: 2019-11-14

## 2019-11-14 VITALS
BODY MASS INDEX: 26.16 KG/M2 | HEIGHT: 65 IN | WEIGHT: 157 LBS | RESPIRATION RATE: 16 BRPM | TEMPERATURE: 97.2 F | DIASTOLIC BLOOD PRESSURE: 78 MMHG | SYSTOLIC BLOOD PRESSURE: 126 MMHG | OXYGEN SATURATION: 100 % | HEART RATE: 78 BPM

## 2019-11-14 DIAGNOSIS — I73.00 RAYNAUD'S DISEASE WITHOUT GANGRENE: ICD-10-CM

## 2019-11-14 DIAGNOSIS — M25.50 POLYARTHRALGIA: ICD-10-CM

## 2019-11-14 DIAGNOSIS — Z23 ENCOUNTER FOR IMMUNIZATION: ICD-10-CM

## 2019-11-14 DIAGNOSIS — H91.92 HEARING LOSS OF LEFT EAR, UNSPECIFIED HEARING LOSS TYPE: ICD-10-CM

## 2019-11-14 DIAGNOSIS — J45.40 MODERATE PERSISTENT ASTHMA WITHOUT COMPLICATION: Primary | ICD-10-CM

## 2019-11-14 NOTE — LETTER
NOTIFICATION RETURN TO WORK / SCHOOL 
 
11/14/2019 10:08 AM 
 
Ms. Chapin Bucriaga [address] [facility] [address] To Whom It May Concern: 
 
Chapin Burciaga is currently under the care of EMMIE Oneal. She will return to work/school on: 11/14/19 If there are questions or concerns please have the patient contact our office. Sincerely, Matias Bourgeois NP 
 
                                
 
 none

## 2019-11-14 NOTE — PROGRESS NOTES
[facility] Note    Haydee Watts is a 29 y.o. female who was seen in clinic today (11/14/2019). Subjective:  Cardiovascular Review:  The patient has no known cardiovascular conditions. Diet and Lifestyle: generally follows a low fat low cholesterol diet, generally follows a low sodium diet, sedentary, smoker 1 pack per week. Pertinent ROS: no TIA's, no chest pain on exertion, no dyspnea on exertion, no swelling of ankles. Asthma Review:  The patient is being seen for follow up of asthma, not currently in exacerbation. Asthma symptoms occur: monthly. Wheezing when present is described as mild-moderate and easily relieved with rescue bronchodilator. Current limitations in activity from asthma: none. Number of days of school or work missed in the last month: 0. Frequency of use of quick-relief meds: monthly. Regimen compliance: The patient reports adherence to this regimen. Patient does not smoke cigarettes. Osteoarthritis and Chronic Pain:  Patient has arthralgias, primarily affecting the knees and joints. Patient also reports changing in her finger color and pain with cold exposure. Symptoms onset: 1 year ago. Rheumatological ROS: joint swelling in knees. Response to treatment plan: symptoms have progressed to a point and plateaued. Patient reports progressive hearing changes in left ear. Prior to Admission medications    Medication Sig Start Date End Date Taking? Authorizing Provider   albuterol (PROAIR HFA) 90 mcg/actuation inhaler Take 2 Puffs by inhalation every four (4) hours as needed for Wheezing. 7/9/19  Yes Bill Boston NP   famotidine (PEPCID) 40 mg tablet Take 1 Tab by mouth daily as needed (acid reflux). 7/9/19  Yes Bill Boston NP   ibuprofen (MOTRIN) 800 mg tablet Take 1 Tab by mouth every eight (8) hours as needed for Pain.  8/5/19   Wolfgang Escobar MD   albuterol (PROVENTIL VENTOLIN) 2.5 mg /3 mL (0.083 %) nebu 3 mL by Nebulization route every four (4) hours as needed (wheezing). 7/9/19   Betty Colunga NP          Allergies   Allergen Reactions    Tomato Hives           ROS  See HPI    Objective:   Physical Exam   Constitutional: She is oriented to person, place, and time. She appears well-developed and well-nourished. No distress. HENT:   Right Ear: Tympanic membrane and ear canal normal.   Left Ear: Tympanic membrane and ear canal normal.   Nose: Mucosal edema present. Right sinus exhibits no maxillary sinus tenderness and no frontal sinus tenderness. Left sinus exhibits no maxillary sinus tenderness and no frontal sinus tenderness. Mouth/Throat: Oropharynx is clear and moist.   Cardiovascular: Normal rate, regular rhythm and normal heart sounds. No murmur heard. Pulmonary/Chest: Effort normal and breath sounds normal. She has no decreased breath sounds. She has no wheezes. She has no rhonchi. Lymphadenopathy:     She has no cervical adenopathy. Neurological: She is alert and oriented to person, place, and time. Psychiatric: She has a normal mood and affect. Her behavior is normal.   Nursing note and vitals reviewed. Visit Vitals  /78 (BP 1 Location: Left arm, BP Patient Position: Sitting)   Pulse 78   Temp 97.2 °F (36.2 °C) (Oral)   Resp 16   Ht 5' 5\" (1.651 m)   Wt 157 lb (71.2 kg)   LMP 10/20/2019   SpO2 100%   BMI 26.13 kg/m²       Assessment & Plan:  Diagnoses and all orders for this visit:    1. Moderate persistent asthma without complication  Stable, no changes to current therapy. Counseled patient on need to quit smoking. Will place order for home nebulizer machine. 2. Raynaud's disease without gangrene  Quit smoking, check labs. Add CCB  -     TSH AND FREE T4  -     CBC WITH AUTOMATED DIFF  -     IRON PROFILE  -     ISIDORO BY MULTIPLEX FLOW IA, QL    3.  Hearing loss of left ear, unspecified hearing loss type  Request ENT evaluation and treatment.   -     REFERRAL TO ENT-OTOLARYNGOLOGY    4. Polyarthralgia  Rule out inflammatory joint disease.   -     RHEUMATOID FACTOR, QL  -     CYCLIC CITRUL PEPTIDE AB, IGG  -     SED RATE (ESR)  -     C REACTIVE PROTEIN, QT  -     METABOLIC PANEL, COMPREHENSIVE    5. Encounter for immunization  -     TETANUS AND DIPHTHERIA TOXOIDS (TD) ADSORBED, PRES. FREE, IN INDIVIDS. >=7, IM      I have discussed the diagnosis with the patient and the intended plan as seen in the above orders. The patient has received an after-visit summary along with patient information handout. I have discussed medication side effects and warnings with the patient as well. Follow-up and Dispositions    · Return if symptoms worsen or fail to improve.            Nicholas Brown, JESUS

## 2019-11-14 NOTE — PROGRESS NOTES
Chief Complaint   Patient presents with    Hypertension    Numbness     c/o end of fingers stay cold, numbness and tingling since last year,turned purple  has increased lately       Reviewed Record in preparation for visit and have obtained necessary documentation. Identified pt with two pt identifiers (Name @ )    Health Maintenance Due   Topic    Pneumococcal 0-64 years (1 of 1 - PPSV23)    DTaP/Tdap/Td series (1 - Tdap)    Influenza Age 5 to Adult          1. Have you been to the ER, urgent care clinic since your last visit? Hospitalized since your last visit? No      2. Have you seen or consulted any other health care providers outside of the [address] [place] [address] since your last visit? Include any pap smears or colon screening.  No

## 2019-11-14 NOTE — PATIENT INSTRUCTIONS
Raynaud's Phenomenon: Care Instructions  Overview  Raynaud's is a condition that causes your hands and feet to overreact to cold. They may become painful and numb, and they can change colors, becoming very pale and then blue. This condition also is called Raynaud's phenomenon. There are two kinds of Raynaud's. Primary Raynaud's, also known as Raynaud's disease, happens by itself and is the most common form. Secondary Raynaud's, also called Raynaud's syndrome, happens as part of another disease. In Raynaud's, the small vessels that bring blood to the skin either become narrow, or constrict for a short period of time. This limits blood flow to the hands and feet and sometimes to the nose or ears. Your hands and feet feel cold and numb and then turn very pale. As blood flow returns, your fingers and toes may turn red, and begin to throb and hurt. Raynaud's can be painful and annoying, but it usually does not cause serious problems. You can take simple steps to protect your hands and feet from the cold. If you have a bad case of Raynaud's and you cannot keep your hands and feet warm enough, your doctor may prescribe medicine. Follow-up care is a key part of your treatment and safety. Be sure to make and go to all appointments, and call your doctor if you are having problems. It's also a good idea to know your test results and keep a list of the medicines you take. How can you care for yourself at home? To prevent Raynaud's episodes or ease symptoms  · Run warm water over your hands or feet to increase blood flow. Use another part of your body, such as your forearm, to make sure the water is not too hot; you could burn your hands or feet and not feel it because they are numb. · Swing your arms in a [place] at the sides of your body (\"windmilling\") to increase blood flow. · If your doctor prescribes medicine to help Raynaud's, take it exactly as prescribed.  Call your doctor if you think you are having a problem with your medicine. · If another condition causes your Raynaud's, make sure to follow your treatment for that condition. · Wear mittens or gloves when it is cold outside. Mittens are warmer than gloves because they keep your fingers together. Gloves underneath mittens will keep your hands warmer than gloves alone. You also can use pot holders or oven mitts when getting something from the freezer or refrigerator. · You can slip chemical hand warmers into your mittens or gloves when you do outside activities. · Do not smoke. Nicotine makes blood vessels constrict, which can bring on an attack. If you need help quitting, talk to your doctor about stop-smoking programs and medicines. These can increase your chances of quitting for good. · Avoid caffeine and cold medicines that have pseudoephedrine. They make blood vessels constrict. Beta-blocker medicines, often used to treat high blood pressure, also can make Raynaud's worse. · Drink plenty of fluids to prevent dehydration, which can lower the amount of blood moving through the blood vessels. If you have kidney, heart, or liver disease and have to limit fluids, talk with your doctor before you increase the amount of fluids you drink. · Try to stay calm when you are under stress. Anxiety can make your blood vessels constrict and lead to a Raynaud's attack. To keep your whole body warm  · Eat a hot meal and drink a warm liquid before going outside. They may help raise your body temperature. · Wear layers of warm clothing. The inner layer should be made of a material such as polypropylene that pulls moisture away from your body. · Wear a hat. · Do not wear clothing that is too tight. It can decrease or cut off blood flow. · Try to stay dry. Choose waterproof, breathable jackets and boots. Being wet makes you more likely to become chilled. When should you call for help?   Call your doctor now or seek immediate medical care if:    · You have severe pain in your hands or feet.     · Normal color does not return to your hands or feet.     · Your hands or feet do not warm up even after home care.    Watch closely for changes in your health, and be sure to contact your doctor if you have any problems. Where can you learn more? Go to http://blane-danna.info/. Enter P043 in the search box to learn more about \"Raynaud's Phenomenon: Care Instructions. \"  Current as of: April 1, 2019  Content Version: 12.2  © 5846-6865 [employer]. Care instructions adapted under license by [employer] (which disclaims liability or warranty for this information). If you have questions about a medical condition or this instruction, always ask your healthcare professional. [address] any warranty or liability for your use of this information.

## 2019-11-15 LAB
ALBUMIN SERPL-MCNC: 4 G/DL (ref 3.5–5.5)
ALBUMIN/GLOB SERPL: 1.4 {RATIO} (ref 1.2–2.2)
ALP SERPL-CCNC: 63 IU/L (ref 39–117)
ALT SERPL-CCNC: 26 IU/L (ref 0–32)
ANA SER QL: POSITIVE
AST SERPL-CCNC: 17 IU/L (ref 0–40)
BASOPHILS # BLD AUTO: 0 X10E3/UL (ref 0–0.2)
BASOPHILS NFR BLD AUTO: 1 %
BILIRUB SERPL-MCNC: 0.2 MG/DL (ref 0–1.2)
BUN SERPL-MCNC: 8 MG/DL (ref 6–20)
BUN/CREAT SERPL: 11 (ref 9–23)
CALCIUM SERPL-MCNC: 8.8 MG/DL (ref 8.7–10.2)
CCP IGA+IGG SERPL IA-ACNC: 7 UNITS (ref 0–19)
CHLORIDE SERPL-SCNC: 105 MMOL/L (ref 96–106)
CO2 SERPL-SCNC: 22 MMOL/L (ref 20–29)
CREAT SERPL-MCNC: 0.76 MG/DL (ref 0.57–1)
CRP SERPL-MCNC: 4 MG/L (ref 0–10)
EOSINOPHIL # BLD AUTO: 0.2 X10E3/UL (ref 0–0.4)
EOSINOPHIL NFR BLD AUTO: 3 %
ERYTHROCYTE [DISTWIDTH] IN BLOOD BY AUTOMATED COUNT: 11.8 % (ref 12.3–15.4)
ERYTHROCYTE [SEDIMENTATION RATE] IN BLOOD BY WESTERGREN METHOD: 30 MM/HR (ref 0–32)
GLOBULIN SER CALC-MCNC: 2.8 G/DL (ref 1.5–4.5)
GLUCOSE SERPL-MCNC: 97 MG/DL (ref 65–99)
HCT VFR BLD AUTO: 36.8 % (ref 34–46.6)
HGB BLD-MCNC: 12.7 G/DL (ref 11.1–15.9)
IMM GRANULOCYTES # BLD AUTO: 0 X10E3/UL (ref 0–0.1)
IMM GRANULOCYTES NFR BLD AUTO: 0 %
IRON SATN MFR SERPL: 13 % (ref 15–55)
IRON SERPL-MCNC: 33 UG/DL (ref 27–159)
LYMPHOCYTES # BLD AUTO: 2 X10E3/UL (ref 0.7–3.1)
LYMPHOCYTES NFR BLD AUTO: 31 %
MCH RBC QN AUTO: 32.2 PG (ref 26.6–33)
MCHC RBC AUTO-ENTMCNC: 34.5 G/DL (ref 31.5–35.7)
MCV RBC AUTO: 93 FL (ref 79–97)
MONOCYTES # BLD AUTO: 0.6 X10E3/UL (ref 0.1–0.9)
MONOCYTES NFR BLD AUTO: 10 %
NEUTROPHILS # BLD AUTO: 3.5 X10E3/UL (ref 1.4–7)
NEUTROPHILS NFR BLD AUTO: 55 %
PLATELET # BLD AUTO: 282 X10E3/UL (ref 150–450)
POTASSIUM SERPL-SCNC: 4.3 MMOL/L (ref 3.5–5.2)
PROT SERPL-MCNC: 6.8 G/DL (ref 6–8.5)
RBC # BLD AUTO: 3.95 X10E6/UL (ref 3.77–5.28)
RHEUMATOID FACT SERPL-ACNC: 53.9 IU/ML (ref 0–13.9)
SODIUM SERPL-SCNC: 140 MMOL/L (ref 134–144)
T4 FREE SERPL-MCNC: 1.2 NG/DL (ref 0.82–1.77)
TIBC SERPL-MCNC: 249 UG/DL (ref 250–450)
TSH SERPL DL<=0.005 MIU/L-ACNC: 0.69 UIU/ML (ref 0.45–4.5)
UIBC SERPL-MCNC: 216 UG/DL (ref 131–425)
WBC # BLD AUTO: 6.4 X10E3/UL (ref 3.4–10.8)

## 2019-11-22 ENCOUNTER — TELEPHONE (OUTPATIENT)
Dept: FAMILY MEDICINE CLINIC | Age: 34
End: 2019-11-22

## 2019-11-22 DIAGNOSIS — M25.50 POLYARTHRALGIA: Primary | ICD-10-CM

## 2019-11-22 DIAGNOSIS — E61.1 IRON DEFICIENCY: ICD-10-CM

## 2019-11-22 DIAGNOSIS — R76.8 ELEVATED RHEUMATOID FACTOR: ICD-10-CM

## 2019-11-22 RX ORDER — LANOLIN ALCOHOL/MO/W.PET/CERES
325 CREAM (GRAM) TOPICAL
Qty: 30 TAB | Refills: 3 | Status: SHIPPED | OUTPATIENT
Start: 2019-11-22 | End: 2020-08-18

## 2019-11-22 NOTE — PROGRESS NOTES
Patient's thyroid levels, glucose, liver and kidneys were normal. Your blood cells for infection and anemia were normal.    Her iron levels were low. I recommend an iron supplement once daily. Labs for rheumatoid arthritis were elevated. I recommend she be evaluated by rheumatology based on her symptoms. Referral placed.

## 2019-11-22 NOTE — TELEPHONE ENCOUNTER
----- Message from Sheri Beckford sent at 11/22/2019  1:37 PM EST -----  Regarding: TRISTAN Saizn/telephone  Contact: [phone]  Caller's first and last name: Frederic Marie  Reason for call: Pt would like nurse to give her a call with her lab results, and also so she would like to know which pharmacy her Rx was sent too. If unable to reach it's okay to leave her a message.   Callback required yes/no and why: yes  Best contact number(s): [phone]  Details to clarify the request: n/a

## 2019-11-22 NOTE — TELEPHONE ENCOUNTER
[phone] Spoke to patient Identified pt with two pt identifiers (Name @ )  Notified patient of her results and understand.  Patient will call Dr Kaity Newby to set up appointment     [phone] [facility] respiratory attempted to call left message needs update on her nebulizer machine     Patient was refill for clindamycin I advise will send to NP Emeli and understand

## 2019-11-22 NOTE — PROGRESS NOTES
[phone] Spoke to patient Identified pt with two pt identifiers (Name @ )  Notified patient of her results and understand.  Patient will call Dr Christiano Chambers to set up appointment

## 2019-11-25 ENCOUNTER — TELEPHONE (OUTPATIENT)
Dept: FAMILY MEDICINE CLINIC | Age: 34
End: 2019-11-25

## 2019-11-25 NOTE — TELEPHONE ENCOUNTER
----- Message from Hoda Carson sent at 11/25/2019  2:47 PM EST -----  Regarding: JESUS Sainz/Telephone  Sal Rape with [facility] is advising that th pt's insurance is out of network for them so her Nebulizer prescription with have to be sent to another pharmacy. Best contact number is [phone].

## 2019-11-25 NOTE — TELEPHONE ENCOUNTER
Received form from [facility] [employer] [facility] and its pending for JESUS wilde

## 2019-11-26 ENCOUNTER — TELEPHONE (OUTPATIENT)
Dept: FAMILY MEDICINE CLINIC | Age: 34
End: 2019-11-26

## 2019-11-26 RX ORDER — CLINDAMYCIN PHOSPHATE AND BENZOYL PEROXIDE 10; 50 MG/G; MG/G
GEL TOPICAL
Qty: 1 TUBE | Refills: 0 | Status: SHIPPED | OUTPATIENT
Start: 2019-11-26 | End: 2020-05-26 | Stop reason: SDUPTHER

## 2019-11-26 NOTE — TELEPHONE ENCOUNTER
----- Message from Hardik Canela sent at 11/26/2019  2:08 PM EST -----  Regarding: NP Emeli/Refill  Caller (if not patient): n/a  Relationship of caller (if not patient): n/a  Best contact number(s): [phone]  Name of medication and dosage if known: \"face cream\"  Is patient out of this medication (yes/no): yes  Pharmacy name: [facility] listed in chart? (yes/no): yes  Pharmacy phone number: on file  Date of last visit: 11/14/19  Details to clarify the request: Pt can answer phone between 12:30-1:30.

## 2019-12-30 DIAGNOSIS — J45.20 MILD INTERMITTENT ASTHMA WITHOUT COMPLICATION: ICD-10-CM

## 2019-12-30 DIAGNOSIS — K21.00 GASTROESOPHAGEAL REFLUX DISEASE WITH ESOPHAGITIS: ICD-10-CM

## 2019-12-30 NOTE — TELEPHONE ENCOUNTER
----- Message from Jv Stout sent at 12/30/2019  8:54 AM EST -----  Regarding: Emeli/Telephone  Contact: [phone]  Caller (if not patient): n/a  Relationship of caller (if not patient): n/a  Best contact number(s): [phone]  Name of medication and dosage if known: \"Albuterol solution\" and her acid reflux pill  Is patient out of this medication (yes/no): Yes  Pharmacy name: [facility] listed in chart? (yes/no): Yes  Pharmacy phone number: n/a  Date of last visit: 11/14/19  Details to clarify the request: Pt needs a medication refill.

## 2019-12-30 NOTE — TELEPHONE ENCOUNTER
PCP: Valorie Tobias NP    Last appt: 11/14/2019  No future appointments. Requested Prescriptions     Pending Prescriptions Disp Refills    albuterol (PROVENTIL VENTOLIN) 2.5 mg /3 mL (0.083 %) nebu 30 Nebule 1     Sig: 3 mL by Nebulization route every four (4) hours as needed (wheezing).  famotidine (PEPCID) 40 mg tablet 30 Tab 3     Sig: Take 1 Tab by mouth daily as needed (acid reflux).

## 2019-12-31 RX ORDER — ALBUTEROL SULFATE 0.83 MG/ML
2.5 SOLUTION RESPIRATORY (INHALATION)
Qty: 30 NEBULE | Refills: 1 | Status: SHIPPED | OUTPATIENT
Start: 2019-12-31 | End: 2021-02-11 | Stop reason: SDUPTHER

## 2019-12-31 RX ORDER — FAMOTIDINE 40 MG/1
40 TABLET, FILM COATED ORAL
Qty: 30 TAB | Refills: 3 | Status: SHIPPED | OUTPATIENT
Start: 2019-12-31 | End: 2021-08-04 | Stop reason: ALTCHOICE

## 2020-05-22 ENCOUNTER — VIRTUAL VISIT (OUTPATIENT)
Dept: FAMILY MEDICINE CLINIC | Age: 35
End: 2020-05-22

## 2020-05-22 DIAGNOSIS — R22.0 FACIAL SWELLING: Primary | ICD-10-CM

## 2020-05-22 DIAGNOSIS — R22.0 TONGUE SWELLING: ICD-10-CM

## 2020-05-22 DIAGNOSIS — M79.89 SWELLING OF RIGHT HAND: ICD-10-CM

## 2020-05-22 NOTE — PROGRESS NOTES
Gilberto Bautista [facility] Note      Subjective:     Chief Complaint   Patient presents with    Facial Swelling     x 2 days    Knee Pain     Radames Bermudez is a 29y.o. year old female who presents for virtual evaluation of the following:      Facial and tongue swelling  Onset yesterday  Initial upper cheeks bilaterally  Trigger: Had ketchup and allergic to tomatoes- usually has skin rash reaction only  Seen by EMT and advised to try benadryl since no stroke symptoms  Tx: 2 benadryl last night   Swelling is worsening today right facial and right sided tongue swelling  Associated right hand and knee swelling. Review of Systems   Pertinent positives and negative per HPI. All other systems  reviewed are negative for a Comprehensive ROS (10+). Past Medical History:   Diagnosis Date    Asthma     Ovarian cyst         Social History     Socioeconomic History    Marital status: SINGLE     Spouse name: Not on file    Number of children: Not on file    Years of education: Not on file    Highest education level: Not on file   Occupational History    Not on file   Social Needs    Financial resource strain: Not on file    Food insecurity     Worry: Not on file     Inability: Not on file    Transportation needs     Medical: Not on file     Non-medical: Not on file   Tobacco Use    Smoking status: Current Every Day Smoker     Packs/day: 0.25     Years: 10.00     Pack years: 2.50    Smokeless tobacco: Current User    Tobacco comment: 1 pACK PER WEEK   Substance and Sexual Activity    Alcohol use: Yes     Comment: socially    Drug use: Yes     Types: Marijuana     Comment: ON OCC.     Sexual activity: Yes     Partners: Male     Birth control/protection: None   Lifestyle    Physical activity     Days per week: Not on file     Minutes per session: Not on file    Stress: Not on file   Relationships    Social connections     Talks on phone: Not on file     Gets together: Not on file     Attends [employer] service: Not on file     Active member of club or organization: Not on file     Attends meetings of clubs or organizations: Not on file     Relationship status: Not on file    Intimate partner violence     Fear of current or ex partner: Not on file     Emotionally abused: Not on file     Physically abused: Not on file     Forced sexual activity: Not on file   Other Topics Concern    Not on file   Social History Narrative    Not on file       Family History   Problem Relation Age of Onset    Hypertension Mother     Cancer Mother         breast     No Known Problems Father     Hypertension Sister     No Known Problems Brother        Current Outpatient Medications   Medication Sig    albuterol (PROVENTIL VENTOLIN) 2.5 mg /3 mL (0.083 %) nebu 3 mL by Nebulization route every four (4) hours as needed (wheezing).  famotidine (PEPCID) 40 mg tablet Take 1 Tab by mouth daily as needed (acid reflux).  clindamycin-benzoyl Peroxide (DUAC) 1.2 %(1 % base) -5 % SR topical gel Apply  to affected area nightly.  ferrous sulfate 325 mg (65 mg iron) tablet Take 1 Tab by mouth Daily (before breakfast).  albuterol (PROAIR HFA) 90 mcg/actuation inhaler Take 2 Puffs by inhalation every four (4) hours as needed for Wheezing.  ibuprofen (MOTRIN) 800 mg tablet Take 1 Tab by mouth every eight (8) hours as needed for Pain. No current facility-administered medications for this visit. Objective: There were no vitals filed for this visit. Vitals measurement not available       Physical Examination:  General: Alert, cooperative, no distress, appears stated age. Eyes: Conjunctivae clear. Pupils equally round. Extraocular muscles intact. Ears: Normal appearing external ear   Nose: Nares normal appearing  Mouth/Throat: Lips, mucosa, and tongue normal. Moist mucous membranes. No tonsillar enlargement noted.    Neck: Supple, symmetrical, trachea midline, no neck mass visualized. Respiratory: Breathing comfortably, in no acute respiratory distress. Cardiovascular: Diffuse right hand swelling  MSK: Upper extremities normal appearing. Skin: Apparent swelling of right lower face  Neurologic: No facial asymmetry. Normal gaze. Cranial nerves intact. Speech is clear  Psychiatric: Affect appropriate. Mood euthymic. Thoughts logical. Speech volume and speed normal. No hallucinations. Well kempt. No visits with results within 3 Month(s) from this visit. Latest known visit with results is:   Office Visit on 11/14/2019   Component Date Value Ref Range Status    TSH 11/14/2019 0.689  0.450 - 4.500 uIU/mL Final    T4, Free 11/14/2019 1.20  0.82 - 1.77 ng/dL Final    WBC 11/14/2019 6.4  3.4 - 10.8 x10E3/uL Final    RBC 11/14/2019 3.95  3.77 - 5.28 x10E6/uL Final    HGB 11/14/2019 12.7  11.1 - 15.9 g/dL Final    HCT 11/14/2019 36.8  34.0 - 46.6 % Final    MCV 11/14/2019 93  79 - 97 fL Final    MCH 11/14/2019 32.2  26.6 - 33.0 pg Final    MCHC 11/14/2019 34.5  31.5 - 35.7 g/dL Final    RDW 11/14/2019 11.8* 12.3 - 15.4 % Final    PLATELET 12/80/0950 762  150 - 450 x10E3/uL Final    NEUTROPHILS 11/14/2019 55  Not Estab. % Final    Lymphocytes 11/14/2019 31  Not Estab. % Final    MONOCYTES 11/14/2019 10  Not Estab. % Final    EOSINOPHILS 11/14/2019 3  Not Estab. % Final    BASOPHILS 11/14/2019 1  Not Estab. % Final    ABS. NEUTROPHILS 11/14/2019 3.5  1.4 - 7.0 x10E3/uL Final    Abs Lymphocytes 11/14/2019 2.0  0.7 - 3.1 x10E3/uL Final    ABS. MONOCYTES 11/14/2019 0.6  0.1 - 0.9 x10E3/uL Final    ABS. EOSINOPHILS 11/14/2019 0.2  0.0 - 0.4 x10E3/uL Final    ABS. BASOPHILS 11/14/2019 0.0  0.0 - 0.2 x10E3/uL Final    IMMATURE GRANULOCYTES 11/14/2019 0  Not Estab. % Final    ABS. IMM. GRANS.  11/14/2019 0.0  0.0 - 0.1 x10E3/uL Final    TIBC 11/14/2019 249* 250 - 450 ug/dL Final    UIBC 11/14/2019 216  131 - 425 ug/dL Final    Iron 11/14/2019 33  27 - 159 ug/dL Final  Iron % saturation 11/14/2019 13* 15 - 55 % Final    Rheumatoid factor 11/14/2019 53.9* 0.0 - 13.9 IU/mL Final    CCP Antibodies IgG/IgA 11/14/2019 7  0 - 19 units Final    Comment:                           Negative               <20                            Weak positive      20 - 39                            Moderate positive  40 - 59                            Strong positive        >59      Sed rate (ESR) 11/14/2019 30  0 - 32 mm/hr Final    C-Reactive Protein, Qt 11/14/2019 4  0 - 10 mg/L Final    Glucose 11/14/2019 97  65 - 99 mg/dL Final    BUN 11/14/2019 8  6 - 20 mg/dL Final    Creatinine 11/14/2019 0.76  0.57 - 1.00 mg/dL Final    GFR est non-AA 11/14/2019 103  >59 mL/min/1.73 Final    GFR est AA 11/14/2019 118  >59 mL/min/1.73 Final    BUN/Creatinine ratio 11/14/2019 11  9 - 23 Final    Sodium 11/14/2019 140  134 - 144 mmol/L Final    Potassium 11/14/2019 4.3  3.5 - 5.2 mmol/L Final    Chloride 11/14/2019 105  96 - 106 mmol/L Final    CO2 11/14/2019 22  20 - 29 mmol/L Final    Calcium 11/14/2019 8.8  8.7 - 10.2 mg/dL Final    Protein, total 11/14/2019 6.8  6.0 - 8.5 g/dL Final    Albumin 11/14/2019 4.0  3.5 - 5.5 g/dL Final    GLOBULIN, TOTAL 11/14/2019 2.8  1.5 - 4.5 g/dL Final    A-G Ratio 11/14/2019 1.4  1.2 - 2.2 Final    Bilirubin, total 11/14/2019 0.2  0.0 - 1.2 mg/dL Final    Alk. phosphatase 11/14/2019 63  39 - 117 IU/L Final    AST (SGOT) 11/14/2019 17  0 - 40 IU/L Final    ALT (SGPT) 11/14/2019 26  0 - 32 IU/L Final    Antinuclear Antibodies Direct 11/14/2019 Positive* Negative Final         Assessment/ Plan:   Diagnoses and all orders for this visit:    1. Facial swelling    2. Tongue swelling    3. Swelling of right hand      Facial and tongue swelling, not improved with otc antihistamine. Patient already in ED parking lot. Advised report to ED for epinephrine or steroid treatment. Need follow up evaluation with PCP of swelling if not improved.  Suspect rheumatologic disorder given positive ISIDORO in 11/2019 although not likely cause of asymmetric facial swelling. We discussed the expected course, resolution and complications of the diagnosis(es) in detail. Medication risks, benefits, costs, interactions, and alternatives were discussed as indicated. I advised her to contact the office if her condition worsens, changes or fails to improve as anticipated. She expressed understanding with the diagnosis(es) and plan. Ignacio Bradley is a 29 y.o. female being evaluated by a video visit encounter for concerns as above. A caregiver was present when appropriate. Due to this being a TeleHealth encounter (During [facility]- public health emergency), evaluation of the following organ systems was limited: Vitals/Constitutional/EENT/Resp/CV/GI//MS/Neuro/Skin/Heme-Lymph-Imm. Pursuant to the emergency declaration under the [facility], 1135 waiver authority and the [employer] and [employer] Act, this Virtual  Visit was conducted, with patient's (and/or legal guardian's) consent, to reduce the patient's risk of exposure to COVID-19 and provide necessary medical care. Services were provided through a video synchronous discussion virtually to substitute for in-person clinic visit. Provider was at home while conducting this encounter. Patient was at home during encounter. Other persons participating in call: None  Consent:  She and/or her healthcare decision maker is aware that this patient-initiated Telehealth encounter is a billable service, with coverage as determined by her insurance carrier. She is aware that she may receive a bill and has provided verbal consent to proceed: Yes  This virtual visit was conducted via [employer]. me.   Pursuant to the emergency declaration under the [facility], 1135 waiver authority and the [employer] and [employer] Supplemental Appropriations Act, this Virtual  Visit was conducted to reduce the patient's risk of exposure to COVID-19 and provide continuity of care for an established patient. Services were provided through a video synchronous discussion virtually to substitute for in-person clinic visit. Due to this being a TeleHealth evaluation, many elements of the physical examination are unable to be assessed. Total Time: minutes: 11-20 minutes. Educated patient on red flag symptoms to warrant return to clinic or emergency room visit. I have discussed the diagnosis with the patient and the intended plan as seen in the above orders. The patient has been offered or received an after-visit summary and questions were answered concerning future plans. I have discussed medication side effects and warnings with the patient as well. Follow-up and Dispositions    · Return if symptoms worsen or fail to improve.          Signed,    Lizeth Cheng MD  5/22/2020

## 2020-05-22 NOTE — PATIENT INSTRUCTIONS
Angioedema: Care Instructions Your Care Instructions Angioedema is an allergic reaction. It causes swelling and welts in the deep layers of the skin. Angioedema can sometimes occur along with hives. Hives are an allergic reaction in the outer layers of the skin. Angioedema can range from mild to severe. Painful welts can develop on the face. Angioedema can also occur on other parts of the body. In severe cases, the inside of the throat can swell and make it hard to breathe. Many things can cause this condition, including foods, insect bites, and medicines (such as aspirin and some blood pressure medicines). It also can run in families. Sometimes you may know what caused the reaction, but other times you may not know. Follow-up care is a key part of your treatment and safety. Be sure to make and go to all appointments, and call your doctor if you are having problems. It's also a good idea to know your test results and keep a list of the medicines you take. How can you care for yourself at home? · Take your medicines exactly as prescribed. Call your doctor if you think you are having a problem with your medicine. You will get more details on the specific medicines your doctor prescribes. Some medicines used to treat angioedema can make you too sleepy to drive safely. Do not drive if you take medicine that may make you sleepy. · Avoid foods or medicine that may have triggered the swelling. · For comfort: ? Try taking a cool bath. Or place a cool, wet towel on the swollen area. ? Avoid hot baths and showers. ? Wear loose clothing. · Your doctor may prescribe a shot of epinephrine to carry with you in case you have a severe reaction. Learn how to give yourself the shot and keep it with you at all times. Make sure it has not . When should you call for help? Give an epinephrine shot if: 
  · You think you are having a severe allergic reaction.  After giving an epinephrine shot call  911, even if you feel better. 
 Call 911 if: 
  · You have symptoms of a severe allergic reaction. These may include: 
? Sudden raised, red areas (hives) all over your body. ? Swelling of the throat, mouth, lips, or tongue. ? Trouble breathing. ? Passing out (losing consciousness). Or you may feel very lightheaded or suddenly feel weak, confused, or restless.  
  · You have been given an epinephrine shot, even if you feel better.  
 Call your doctor now or seek immediate medical care if: 
  · You have symptoms of an allergic reaction, such as: ? A rash or hives (raised, red areas on the skin). ? Itching. ? Swelling. ? Belly pain, nausea, or vomiting.  
 Watch closely for changes in your health, and be sure to contact your doctor if: 
  · You do not get better as expected. Where can you learn more? Go to http://blane-danna.info/ Enter L340 in the search box to learn more about \"Angioedema: Care Instructions. \" Current as of: October 6, 2019Content Version: 12.4 © 6140-6384 [employer]. Care instructions adapted under license by [employer] (which disclaims liability or warranty for this information). If you have questions about a medical condition or this instruction, always ask your healthcare professional. [address] any warranty or liability for your use of this information.

## 2020-05-22 NOTE — PROGRESS NOTES
Chief Complaint   Patient presents with    Facial Swelling     x 2 days    Knee Pain     1. Have you been to the ER, urgent care clinic since your last visit? Hospitalized since your last visit? No    2. Have you seen or consulted any other health care providers outside of the [address] [place] [address] since your last visit? Include any pap smears or colon screening.  No

## 2020-05-26 ENCOUNTER — VIRTUAL VISIT (OUTPATIENT)
Dept: FAMILY MEDICINE CLINIC | Age: 35
End: 2020-05-26

## 2020-05-26 DIAGNOSIS — T78.1XXD ALLERGIC REACTION TO FOOD, SUBSEQUENT ENCOUNTER: ICD-10-CM

## 2020-05-26 DIAGNOSIS — H92.01 OTALGIA, RIGHT: Primary | ICD-10-CM

## 2020-05-26 RX ORDER — NAPROXEN 500 MG/1
500 TABLET ORAL
Qty: 30 TAB | Refills: 1 | Status: SHIPPED | OUTPATIENT
Start: 2020-05-26 | End: 2020-07-01 | Stop reason: ALTCHOICE

## 2020-05-26 RX ORDER — CLINDAMYCIN PHOSPHATE AND BENZOYL PEROXIDE 10; 50 MG/G; MG/G
GEL TOPICAL
Qty: 1 TUBE | Refills: 3 | Status: SHIPPED | OUTPATIENT
Start: 2020-05-26 | End: 2021-02-11 | Stop reason: SDUPTHER

## 2020-05-26 RX ORDER — CEFDINIR 300 MG/1
300 CAPSULE ORAL 2 TIMES DAILY
Qty: 20 CAP | Refills: 0 | Status: SHIPPED | OUTPATIENT
Start: 2020-05-26 | End: 2020-06-05

## 2020-05-26 RX ORDER — EPINEPHRINE 0.3 MG/.3ML
0.3 INJECTION SUBCUTANEOUS
Qty: 2 SYRINGE | Refills: 2 | Status: SHIPPED | OUTPATIENT
Start: 2020-05-26 | End: 2020-05-26

## 2020-05-26 NOTE — PROGRESS NOTES
Radames Bermudez is a 29 y.o. female who was seen by synchronous (real-time) audio-video technology on 5/26/2020. Consent: Radames Bermudez, who was seen by synchronous (real-time) audio-video technology, and/or her healthcare decision maker, is aware that this patient-initiated, Telehealth encounter on 5/26/2020 is a billable service, with coverage as determined by her insurance carrier. She is aware that she may receive a bill and has provided verbal consent to proceed: Yes. Assessment & Plan:   Diagnoses and all orders for this visit:    1. Otalgia, right  Will cover for otitis media given limited physical exam. NSAIDs for pain. -     cefdinir (OMNICEF) 300 mg capsule; Take 1 Cap by mouth two (2) times a day for 10 days.  -     naproxen (NAPROSYN) 500 mg tablet; Take 1 Tab by mouth two (2) times daily as needed for Pain. 2. Allergic reaction to food, subsequent encounter  Continue Benadryl PRN.   -     EPINEPHrine (EpiPen 2-Bib) 0.3 mg/0.3 mL injection; 0.3 mL by IntraMUSCular route once as needed for Allergic Response for up to 1 dose. Other orders  -     clindamycin-benzoyl Peroxide (Duac) 1.2 %(1 % base) -5 % SR topical gel; Apply  to affected area nightly. I spent at least 23 minutes on this visit with this established patient. [phone]  Subjective:   Radames Bermudez is a 29 y.o. female who was seen for Allergic Reaction    Allergic Reaction  Patient seen for follow up of allergic reaction to ketchup. Patient has previous tomato allergy. Went to ED on 5/22/20 and was treated with Benadryl and prednisone. Reports continued right ear pain since onset. Has since stopped the prednisone due to side effects. Prior to Admission medications    Medication Sig Start Date End Date Taking? Authorizing Provider   clindamycin-benzoyl Peroxide (Duac) 1.2 %(1 % base) -5 % SR topical gel Apply  to affected area nightly.  5/26/20  Yes Annette Desir NP   cefdinir (OMNICEF) 300 mg capsule Take 1 Cap by mouth two (2) times a day for 10 days. 5/26/20 6/5/20 Yes Angelica Sainz NP   EPINEPHrine (EpiPen 2-Bib) 0.3 mg/0.3 mL injection 0.3 mL by IntraMUSCular route once as needed for Allergic Response for up to 1 dose. 5/26/20 5/26/20 Yes Angelica Sainz NP   naproxen (NAPROSYN) 500 mg tablet Take 1 Tab by mouth two (2) times daily as needed for Pain. 5/26/20  Yes Angelica Sainz NP   albuterol (PROVENTIL VENTOLIN) 2.5 mg /3 mL (0.083 %) nebu 3 mL by Nebulization route every four (4) hours as needed (wheezing). 12/31/19   Preston Hunter NP   famotidine (PEPCID) 40 mg tablet Take 1 Tab by mouth daily as needed (acid reflux). 12/31/19   Preston Hunter NP   clindamycin-benzoyl Peroxide (DUAC) 1.2 %(1 % base) -5 % SR topical gel Apply  to affected area nightly. 11/26/19 5/26/20  Preston Hunter NP   ferrous sulfate 325 mg (65 mg iron) tablet Take 1 Tab by mouth Daily (before breakfast). 11/22/19   Preston Hunter NP   ibuprofen (MOTRIN) 800 mg tablet Take 1 Tab by mouth every eight (8) hours as needed for Pain. 8/5/19   Saúl Evans MD   albuterol [facility] HFA) 90 mcg/actuation inhaler Take 2 Puffs by inhalation every four (4) hours as needed for Wheezing. 7/9/19   Preston Hunter NP     Allergies   Allergen Reactions    Tomato Hives       Past Medical History:   Diagnosis Date    Asthma     Ovarian cyst      No past surgical history on file.     ROS  See HPI    Objective:     Visit Vitals  LMP 05/22/2020 (Exact Date)      General: alert, cooperative, no distress   Mental  status: normal mood, behavior, speech, dress, motor activity, and thought processes, able to follow commands   HENT: NCAT   Neck: no visualized mass   Resp: no respiratory distress   Neuro: no gross deficits   Skin: no discoloration or lesions of concern on visible areas   Psychiatric: normal affect, consistent with stated mood, no evidence of hallucinations       We discussed the expected course, resolution and complications of the diagnosis(es) in detail. Medication risks, benefits, costs, interactions, and alternatives were discussed as indicated. I advised her to contact the office if her condition worsens, changes or fails to improve as anticipated. She expressed understanding with the diagnosis(es) and plan. Jannette Castellon is a 29 y.o. female who was evaluated by a video visit encounter for concerns as above. Patient identification was verified prior to start of the visit. A caregiver was present when appropriate. Due to this being a TeleHealth encounter ([facility]), evaluation of the following organ systems was limited: Vitals/Constitutional/EENT/Resp/CV/GI//MS/Neuro/Skin/Heme-Lymph-Imm. Pursuant to the emergency declaration under the [facility], 1135 waiver authority and the [employer] and [employer] Act, this Virtual  Visit was conducted, with patient's (and/or legal guardian's) consent, to reduce the patient's risk of exposure to COVID-19 and provide necessary medical care. Services were provided through a video synchronous discussion virtually to substitute for in-person clinic visit. Patient and provider were located at their individual homes.       Kelly Mackey NP

## 2020-07-01 ENCOUNTER — VIRTUAL VISIT (OUTPATIENT)
Dept: FAMILY MEDICINE CLINIC | Age: 35
End: 2020-07-01

## 2020-07-01 DIAGNOSIS — N30.00 ACUTE CYSTITIS WITHOUT HEMATURIA: Primary | ICD-10-CM

## 2020-07-01 RX ORDER — NITROFURANTOIN 25; 75 MG/1; MG/1
100 CAPSULE ORAL 2 TIMES DAILY
Qty: 6 CAP | Refills: 0 | Status: SHIPPED | OUTPATIENT
Start: 2020-07-01 | End: 2020-07-04

## 2020-07-01 NOTE — PROGRESS NOTES
VIRTUAL VISIT    Chief Complaint   Patient presents with    Bladder Infection       HISTORY OF PRESENT ILLNESS   HPI  UTI NOTE    Duration or Onset of Symptoms: 2 days    Positive Symptoms: urinary urgency, frequency, bladder pressure, strong urine: concentrated and strong smell    Negative Symptoms: denies burning, pain, discharge, hematuria, flank pain, nausea, vomiting, fevers, chills, sweats    Remedies Tried: increased water intake, cranberry juice, cranberry tablets    History of Recurrent UTI's: 1-2 x a year  Prior Successful Antibiotic Treatment: cant recall  History of Pyelonephritis: denies  History of  Procedures or Previous Urologic Evaluation: denies  Sexually Active: yes  LMP: 6-  Caffeine Usage: Rare    Social History     Tobacco Use   Smoking Status Current Every Day Smoker    Packs/day: 0.25    Years: 10.00    Pack years: 2.50   Smokeless Tobacco Current User   Tobacco Comment    1 pack per week     Social History     Substance and Sexual Activity   Alcohol Use Yes    Comment: socially        REVIEW OF SYMPTOMS   Review of Systems   Constitutional: Negative for chills and fever. Gastrointestinal: Negative for nausea and vomiting. Genitourinary: Positive for frequency and urgency. Negative for dysuria, flank pain and hematuria. PROBLEM LIST/MEDICAL HISTORY     Problem List  Date Reviewed: 7/1/2020    None              PAST SURGICAL HISTORY   History reviewed. No pertinent surgical history. MEDICATIONS     Current Outpatient Medications   Medication Sig    famotidine (PEPCID) 40 mg tablet Take 1 Tab by mouth daily as needed (acid reflux).  ferrous sulfate 325 mg (65 mg iron) tablet Take 1 Tab by mouth Daily (before breakfast).  clindamycin-benzoyl Peroxide (Duac) 1.2 %(1 % base) -5 % SR topical gel Apply  to affected area nightly.  albuterol (PROVENTIL VENTOLIN) 2.5 mg /3 mL (0.083 %) nebu 3 mL by Nebulization route every four (4) hours as needed (wheezing).     albuterol (PROAIR HFA) 90 mcg/actuation inhaler Take 2 Puffs by inhalation every four (4) hours as needed for Wheezing. No current facility-administered medications for this visit. ALLERGIES     Allergies   Allergen Reactions    Tomato Hives          SOCIAL HISTORY     Social History     Socioeconomic History    Marital status: SINGLE     Spouse name: Not on file    Number of children: Not on file    Years of education: Not on file    Highest education level: Not on file   Occupational History    Occupation: [employer]   Tobacco Use    Smoking status: Current Every Day Smoker     Packs/day: 0.25     Years: 10.00     Pack years: 2.50    Smokeless tobacco: Current User    Tobacco comment: 1 pack per week   Substance and Sexual Activity    Alcohol use: Yes     Comment: socially    Drug use: Yes     Types: Marijuana     Comment: occasional    Sexual activity: Yes     Partners: Male     Birth control/protection: None        IMMUNIZATIONS  Immunization History   Administered Date(s) Administered    Td, Adsorbed 11/14/2019         FAMILY HISTORY     Family History   Problem Relation Age of Onset    Hypertension Mother     Cancer Mother         breast     No Known Problems Father     Hypertension Sister     No Known Problems Brother          VITALS   Vitals limited due to virtual visit. Self reported data collected today:  Patient-Reported Vitals 7/1/2020   Patient-Reported LMP 6-       PHYSICAL EXAMINATION   Physical Exam  Constitutional:       General: She is not in acute distress. Appearance: Normal appearance. She is not ill-appearing.    Pulmonary:      Effort: Pulmonary effort is normal.           LABORATORY DATA      Urine unavailable due to today's virtual visit      Lab Results   Component Value Date/Time    GFR est non- 11/14/2019 10:23 AM    GFRNA, POC >60 11/07/2012 09:49 PM    GFR est  11/14/2019 10:23 AM    GFRAA, POC >60 11/07/2012 09:49 PM Creatinine 0.76 11/14/2019 10:23 AM    Creatinine (POC) 1.0 11/07/2012 09:49 PM    BUN 8 11/14/2019 10:23 AM    BUN (POC) 9 11/07/2012 09:49 PM    Sodium 140 11/14/2019 10:23 AM    Sodium (POC) 141 11/07/2012 09:49 PM    Potassium 4.3 11/14/2019 10:23 AM    Potassium (POC) 4.0 11/07/2012 09:49 PM    Chloride 105 11/14/2019 10:23 AM    Chloride (POC) 101 11/07/2012 09:49 PM    CO2 22 11/14/2019 10:23 AM    Magnesium 2.1 05/12/2017 03:48 PM         ASSESSMENT & PLAN       ICD-10-CM ICD-9-CM    1. Acute cystitis without hematuria N30.00 595.0 nitrofurantoin, macrocrystal-monohydrate, (Macrobid) 100 mg capsule     Patient presents via virtual visit today for urinary complaints similar to her past UTI's. Empirically treat w/ 3 day course of Macrobid 100 mg bid. Reviewed medication and potential side effects  Push water and cranberry juice  Follow up if symptoms persist beyond expectant course, sooner if anything worsens in the interim  UTI counseling  ---------------------------------------------------------------------------------------------------------------  Patient is being evaluated by a video visit encounter for concerns as above. A caregiver was present when appropriate. Due to this being a TeleHealth encounter (During [facility]-03 public health emergency), evaluation of the following organ systems was limited: Vitals/Constitutional/EENT/Resp/CV/GI//MS/Neuro/Skin/Heme-Lymph-Imm. Pursuant to the emergency declaration under the [facility], 1135 waiver authority and the [employer] and [employer] Act, this Virtual  Visit was conducted, with patient's (and/or legal guardian's) consent, to reduce the patient's risk of exposure to COVID-19 and provide necessary medical care. Services were provided through a video synchronous discussion virtually to substitute for in-person clinic visit. Patient was located at their own home.  I was at home while conducting this encounter. Consent:  She and/or her healthcare decision maker is aware that this patient-initiated Telehealth encounter is a billable service, with coverage as determined by her insurance carrier. She is aware that she may receive a bill and has provided verbal consent to proceed: Yes  This virtual visit was conducted via [employer]. Pursuant to the emergency declaration under the [facility], 1135 waiver authority and the [address] and [employer] Act, this Virtual  Visit was conducted to reduce the patient's risk of exposure to COVID-19 and provide continuity of care for an established patient. Services were provided through a video synchronous discussion virtually to substitute for in-person clinic visit. Due to this being a TeleHealth evaluation, many elements of the physical examination are unable to be assessed. Total Time: minutes: 11-20 minutes.

## 2020-08-18 ENCOUNTER — OFFICE VISIT (OUTPATIENT)
Dept: RHEUMATOLOGY | Age: 35
End: 2020-08-18
Payer: MEDICAID

## 2020-08-18 VITALS
SYSTOLIC BLOOD PRESSURE: 100 MMHG | HEIGHT: 65 IN | DIASTOLIC BLOOD PRESSURE: 67 MMHG | RESPIRATION RATE: 18 BRPM | BODY MASS INDEX: 24.16 KG/M2 | TEMPERATURE: 98 F | HEART RATE: 70 BPM | WEIGHT: 145 LBS

## 2020-08-18 DIAGNOSIS — I73.89 ACROCYANOSIS (HCC): ICD-10-CM

## 2020-08-18 DIAGNOSIS — R76.8 RHEUMATOID FACTOR POSITIVE WITH CYCLIC CITRULLINATED PEPTIDE (CCP) ANTIBODY NEGATIVE: ICD-10-CM

## 2020-08-18 DIAGNOSIS — M25.50 POLYARTHRALGIA: ICD-10-CM

## 2020-08-18 DIAGNOSIS — R76.8 POSITIVE ANA (ANTINUCLEAR ANTIBODY): Primary | ICD-10-CM

## 2020-08-18 PROCEDURE — 99205 OFFICE O/P NEW HI 60 MIN: CPT | Performed by: INTERNAL MEDICINE

## 2020-08-18 NOTE — PROGRESS NOTES
REASON FOR VISIT    This is the initial evaluation for Ms. Miranda a 28 y.o.  female for question of an inflammatory arthritis. [facility] the request of Jean-Paul Flores NP.     HISTORY OF PRESENT ILLNESS      I have reviewed and summarized old records from [facility]    In 11/14/2019, labs showed WBC 6.4, lymphocytes 2.0, Hct 36.8%, platelets 901,596, TSH 0.689, ESR 30 mm/hr, CRP 4 mg/L, positive ISIDORO direct, rheumatoid factor 53.9, negative anti-CCP    She is a smoker and is trying to quit, and is down to one pack over 2 weeks. Today, she feels well. She denies pain, swelling, or stiffness in her joints. She reports that if she is cold, she has pain in her hands and knees. She feels it at night when the the [facility] temperature is low. She feels well when she is warm, so if she is exposed to the cold, she has pain and then when she warms up, her pain resolves. She reports that when she was working two jobs and standing most of the day, she would develop swelling in her knees that would resolve. She still has knee swelling this past Sunday when helping her sister move and then resolves quickly after elevation. This does not occur if she exercises. This is not present in the morning. This has been going on for about one year. She also reports having bluish/purple discoloration of her finger tips when she is cold. This has been going on for about 2 years. 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 Therapy History includes:  Current DMARD therapy includes: none  Prior DMARD therapy includes: none  The following DMARDs have been ineffective: none  The following DMARDs were stopped because of side effects: none    REVIEW OF SYSTEMS    A 15 point review of systems was performed and summarized below. The questionnaire was reviewed with the patient and scanned into the patient's medical record.     General: denies recent weight gain, recent weight loss, fatigue, weakness, fever, drenching night sweats  Musculoskeletal: denies joint pain, joint swelling, morning stiffness, muscle pain  Ears: denies ringing in ears, hearing loss, deafness  Eyes: denies pain, light sensitive, redness, blindness, double vision, blurred vision, excess tearing, dryness, foreign body sensation  Mouth: denies sore tongue, oral ulcers, loss of taste, dryness, increased dental caries  Nose: denies nosebleeds, nasal ulcers  Throat: denies food stuck when swallowing, difficulty with swallowing, hoarseness, pain in jaw while chewing  Neck: denies swollen glands, tender glands  Cardiopulmonary: denies pain in chest with deep breaths, pain in chest when lying down, murmurs, sudden changes in heart beat, wheezing, dry cough, productive cough, shortness of breath at rest, shortness of breath on exertion, coughing of blood  Gastrointestinal: denies nausea, heartburn, stomach pain relieved by food, chronic constipation, chronic diarrhea, blood in stools, black stools  Genitourinary: denies vaginal dryness, pain or burning on urination, blood in urine, cloudy urine, vaginal ulcers   Hematologic: denies anemia, bleeding tendency, blood clots, bleeding gums  Skin: denies easy bruising, hair loss, rash, rash worsened after sun exposure, hives/urticaria, skin thickening, skin tightness, nodules/bumps, color changes of hands or feet in the cold (Raynaud's)  Neurologic: denies numbness or tingling in hands, numbness or tingling in feet, muscle weakness  Psychiatric: denies depression, excessive worries, PTSD, Bipolar  Sleep: denies poor sleep (8 hours), snoring, apnea, daytime somnolence, difficulty falling asleep, difficulty staying asleep     PAST MEDICAL HISTORY    She has a past medical history of Asthma and Ovarian cyst.     FAMILY HISTORY    Her family history includes Cancer in her mother; Hypertension in her mother and sister;  No Known Problems in her brother and father. SOCIAL HISTORY    She reports that she has been smoking. She has a 2.50 pack-year smoking history. She uses smokeless tobacco. She reports current alcohol use. She reports previous drug use. Drug: Marijuana. GYNECOLOGIC HISTORY     0, Para 0, Living 0, Miscarriage 0    HEALTH MAINTENANCE    Immunizations  Immunization History   Administered Date(s) Administered    Td, Adsorbed 2019       Age Appropriate Cancer Screening    PAP Smear: 2019    MEDICATIONS    Current Outpatient Medications   Medication Sig Dispense Refill    clindamycin-benzoyl Peroxide (Duac) 1.2 %(1 % base) -5 % SR topical gel Apply  to affected area nightly. 1 Tube 3    albuterol (PROVENTIL VENTOLIN) 2.5 mg /3 mL (0.083 %) nebu 3 mL by Nebulization route every four (4) hours as needed (wheezing). 30 Nebule 1    famotidine (PEPCID) 40 mg tablet Take 1 Tab by mouth daily as needed (acid reflux). 30 Tab 3    albuterol (PROAIR HFA) 90 mcg/actuation inhaler Take 2 Puffs by inhalation every four (4) hours as needed for Wheezing. 2 Inhaler 3       ALLERGIES    Allergies   Allergen Reactions    Tomato Hives       PHYSICAL EXAMINATION    Visit Vitals  /67   Pulse 70   Temp 98 °F (36.7 °C)   Resp 18   Ht 5' 5\" (1.651 m)   Wt 145 lb (65.8 kg)   BMI 24.13 kg/m²     Body mass index is 24.13 kg/m². General: Patient is alert, oriented x 3, not in acute distress    HEENT:   Conjunctiva are not injected and appear moist, there is no alopecia    Cardiovascular:  Heart is regular rate and rhythm, no murmurs. Chest:  Lungs are clear to auscultation bilaterally. Extremities:  Free of clubbing, cyanosis, edema, extremities well perfused. Neurological exam:  Muscle strength is full in upper and lower extremities. Skin exam:  There are no rashes, no tophi, no psoriasis, no active Raynaud's, no livedo reticularis, no periungual erythema.     Musculoskeletal exam:  A comprehensive musculoskeletal exam was performed for all joints of each upper and lower extremity and assessed for swelling, tenderness and range of motion. Pertinent results are documented as below:    Bilateral patellar laxity wihtout tenderness or swelling (right more loose than left)  No synovitis    Z-Deformities:   no  Gray Neck Deformities:  no  Boutonierre's Deformities:  no  Ulnar Deviation:   no  MCP Subluxation:  no    Joint Count 8/18/2020   Patient pain (0-100) 0   MHAQ 0   Patient Assessment (0-10) 0     DATA REVIEW    Prior medical records were reviewed and are summarized as below:    Laboratory data: summarized in the HPI    Imaging: reviewed in EMR    ASSESSMENT AND PLAN    1) Positive Rheumatoid Factor and negative anti-CCP. She does not have a clinical history of synovitis. No synovitis on exam. I discussed with her inflammatory joint symptoms, and asked her to follow up with me if she develops them. The positive rheumatoid factor may also be secondary to other pathologies, but her ROS was negative. I ordered a comprehensive autoimmune panel. 2) Positive ISIDORO. She does not have a clinical history or examination consistent with an autoimmune disease, such as systemic lupus erythematosus, at this time. Low positive ISIDORO 1:40 and 1:160 may be positive in up to 32% and 5%, respectively, in the normal population [facility] et al. Arthritis Res Ther. 2008;10(6):R131. Epub 2008 Nov 11). A history of thyroid disease in the family or thyroid disease in the patient can also cause a positive ISIDORO. Viral infections, malignancy and medications can also cause a positive ISIDORO. The fluorescent ISIDORO test is more sensitive/specific than the direct ISIDORO. Diagnosing systemic lupus is complicated in the sense that you need to look at the entire picture of a patient's presentation and their laboratory workup. In this video, Dr. Moses Abdullahi with the [facility], discusses the process of diagnosing Lupus. ([email or web address] [email or web address]/diagnosing-lupus-lupus-education-series). I will check her ISIDORO IFA. I also ordered a comprehensive autoimmune panel. 3) Cold Induced Hand and Knee pain. This is non-specific and does not correlate with an underlying inflammatory process. Her pain abates when she warms up. 4) Patellofemoral Arthralgia. This is a benign knee condition due to mistracking of the patella that may cause pain in the knees with prolonged sitting, or going up or down stairs. This tends to be present in hypermobile knees and may become worse if obesity is present. Treatment is to strengthen the quadriceps muscles. She reports knee swelling after prolonged standing that resolves with rest and leg elevation, which is not inflammatory. If this becomes symptomatic, I recommend referral to physical therapy. 5) Digital Acrocyanosis. This is not Raynaud's. The patient voiced understanding of the aforementioned assessment and plan. Summary of plan was provided in the After Visit Summary patient instructions. I also provided education about [employer] setup and utility. TODAY'S ORDERS    Orders Placed This Encounter    ANTINUCLEAR ANTIBODIES, IFA    SJOGREN'S ABS, SSA AND SSB    SMITH ANTIBODIES    MYOSITIS PANEL II    CHRONIC HEPATITIS PANEL    COMPLEMENT, CH50, TOTAL    T PALLIDUM SCREEN W/REFLEX    BETA-2 GLYCOPROTEIN I ABS    CARDIOLIPIN AB PANEL    COMPLEMENT, C3 & C4    CBC WITH AUTOMATED DIFF    METABOLIC PANEL, COMPREHENSIVE    C REACTIVE PROTEIN, QT    DSDNA ANTIBODY BY IFA, CRITHIDIA LUCILIAE, WITH REFLEX TO TITER    SED RATE (ESR)    LUPUS ANTICOAGULANT PANEL W/ REFLEX    PROTEIN ELECTROPHORESIS W/ REFLX SATISH    IMMUNOGLOBULINS, G/A/M, QT.    RHEUMATOID FACTOR, QL    CYCLIC CITRUL PEPTIDE AB, IGG    DIRECT DAVY     No future appointments.     Yanni Barrera MD, [address]    Adult Rheumatology   Rheumatology Ultrasound Certified  [facility]  A Part of [facility], [facility], [address]   Phone [phone]  Fax [phone]

## 2020-08-18 NOTE — LETTER
8/18/20 Patient: Bautista Lauren YOB: 1985 Date of Visit: 8/18/2020 Page Morrison NP 
[address] [address] VIA In Basket Dear Page Morrison NP, Thank you for referring Ms. Michelle Miranda to [address] [place] [address] for evaluation. My notes for this consultation are attached. If you have questions, please do not hesitate to call me. I look forward to following your patient along with you.  
 
 
Sincerely, 
 
Chauncey Schulz MD

## 2020-08-18 NOTE — PATIENT INSTRUCTIONS
Contact me if you develop joint pain, swelling, and/or stiffness that is worse in the morning or evening or if you develop any other symptoms

## 2020-08-21 LAB
ALBUMIN SERPL ELPH-MCNC: 4 G/DL (ref 2.9–4.4)
ALBUMIN SERPL-MCNC: 4.5 G/DL (ref 3.8–4.8)
ALBUMIN/GLOB SERPL: 1.3 {RATIO} (ref 0.7–1.7)
ALBUMIN/GLOB SERPL: 1.7 {RATIO} (ref 1.2–2.2)
ALP SERPL-CCNC: 67 IU/L (ref 39–117)
ALPHA1 GLOB SERPL ELPH-MCNC: 0.2 G/DL (ref 0–0.4)
ALPHA2 GLOB SERPL ELPH-MCNC: 0.6 G/DL (ref 0.4–1)
ALT SERPL-CCNC: 9 IU/L (ref 0–32)
ANA TITR SER IF: POSITIVE {TITER}
AST SERPL-CCNC: 11 IU/L (ref 0–40)
B-GLOBULIN SERPL ELPH-MCNC: 0.9 G/DL (ref 0.7–1.3)
B2 GLYCOPROT1 IGA SER-ACNC: 19 GPI IGA UNITS (ref 0–25)
B2 GLYCOPROT1 IGG SER-ACNC: <9 GPI IGG UNITS (ref 0–20)
B2 GLYCOPROT1 IGM SER-ACNC: 27 GPI IGM UNITS (ref 0–32)
BASOPHILS # BLD AUTO: 0 X10E3/UL (ref 0–0.2)
BASOPHILS NFR BLD AUTO: 1 %
BILIRUB SERPL-MCNC: 0.3 MG/DL (ref 0–1.2)
BUN SERPL-MCNC: 9 MG/DL (ref 6–20)
BUN/CREAT SERPL: 10 (ref 9–23)
C3 SERPL-MCNC: 95 MG/DL (ref 82–167)
C4 SERPL-MCNC: 20 MG/DL (ref 14–44)
CALCIUM SERPL-MCNC: 9.3 MG/DL (ref 8.7–10.2)
CARDIOLIPIN IGA SER IA-ACNC: <9 APL U/ML (ref 0–11)
CARDIOLIPIN IGG SER IA-ACNC: <9 GPL U/ML (ref 0–14)
CARDIOLIPIN IGM SER IA-ACNC: <9 MPL U/ML (ref 0–12)
CCP IGA+IGG SERPL IA-ACNC: 6 UNITS (ref 0–19)
CENTROMERE AB TITR SER IF: ABNORMAL {TITER}
CH50 SERPL-ACNC: >60 U/ML
CHLORIDE SERPL-SCNC: 104 MMOL/L (ref 96–106)
CO2 SERPL-SCNC: 25 MMOL/L (ref 20–29)
COMMENT, 144067: NORMAL
CREAT SERPL-MCNC: 0.93 MG/DL (ref 0.57–1)
CRP SERPL-MCNC: 3 MG/L (ref 0–10)
DAT POLY-SP REAG RBC QL: NEGATIVE
DSDNA AB SER QL CLIF: NEGATIVE
ENA SM AB SER-ACNC: <0.2 AI (ref 0–0.9)
ENA SS-A AB SER-ACNC: <0.2 AI (ref 0–0.9)
ENA SS-B AB SER-ACNC: <0.2 AI (ref 0–0.9)
EOSINOPHIL # BLD AUTO: 0.5 X10E3/UL (ref 0–0.4)
EOSINOPHIL NFR BLD AUTO: 9 %
ERYTHROCYTE [DISTWIDTH] IN BLOOD BY AUTOMATED COUNT: 11.8 % (ref 11.7–15.4)
ERYTHROCYTE [SEDIMENTATION RATE] IN BLOOD BY WESTERGREN METHOD: 14 MM/HR (ref 0–32)
GAMMA GLOB SERPL ELPH-MCNC: 1.4 G/DL (ref 0.4–1.8)
GLOBULIN SER CALC-MCNC: 2.6 G/DL (ref 1.5–4.5)
GLOBULIN SER CALC-MCNC: 3.1 G/DL (ref 2.2–3.9)
GLUCOSE SERPL-MCNC: 103 MG/DL (ref 65–99)
HBV CORE AB SERPL QL IA: NEGATIVE
HBV CORE IGM SERPL QL IA: NEGATIVE
HBV E AB SERPL QL IA: NEGATIVE
HBV E AG SERPL QL IA: NEGATIVE
HBV SURFACE AB SER QL: REACTIVE
HBV SURFACE AG SERPL QL IA: NEGATIVE
HCT VFR BLD AUTO: 35.9 % (ref 34–46.6)
HCV AB S/CO SERPL IA: <0.1 S/CO RATIO (ref 0–0.9)
HGB BLD-MCNC: 12.5 G/DL (ref 11.1–15.9)
IGA SERPL-MCNC: 329 MG/DL (ref 87–352)
IGG SERPL-MCNC: 1344 MG/DL (ref 586–1602)
IGM SERPL-MCNC: 92 MG/DL (ref 26–217)
IMM GRANULOCYTES # BLD AUTO: 0 X10E3/UL (ref 0–0.1)
IMM GRANULOCYTES NFR BLD AUTO: 0 %
INTERPRETATION, 117893: NORMAL
LYMPHOCYTES # BLD AUTO: 2 X10E3/UL (ref 0.7–3.1)
LYMPHOCYTES NFR BLD AUTO: 32 %
Lab: ABNORMAL
M PROTEIN SERPL ELPH-MCNC: NORMAL G/DL
MCH RBC QN AUTO: 31.8 PG (ref 26.6–33)
MCHC RBC AUTO-ENTMCNC: 34.8 G/DL (ref 31.5–35.7)
MCV RBC AUTO: 91 FL (ref 79–97)
MONOCYTES # BLD AUTO: 0.6 X10E3/UL (ref 0.1–0.9)
MONOCYTES NFR BLD AUTO: 9 %
NEUTROPHILS # BLD AUTO: 3 X10E3/UL (ref 1.4–7)
NEUTROPHILS NFR BLD AUTO: 49 %
PLATELET # BLD AUTO: 263 X10E3/UL (ref 150–450)
PLEASE NOTE, 011150: NORMAL
POTASSIUM SERPL-SCNC: 4.9 MMOL/L (ref 3.5–5.2)
PROT PATTERN SERPL ELPH-IMP: NORMAL
PROT SERPL-MCNC: 7.1 G/DL (ref 6–8.5)
RBC # BLD AUTO: 3.93 X10E6/UL (ref 3.77–5.28)
RHEUMATOID FACT SERPL-ACNC: 50.4 IU/ML (ref 0–13.9)
SCREEN APTT: 33.4 SEC (ref 0–51.9)
SCREEN DRVVT: 29.5 SEC (ref 0–47)
SODIUM SERPL-SCNC: 141 MMOL/L (ref 134–144)
TREPONEMA PALLIDUM IGG+IGM AB [PRESENCE] IN SERUM OR PLASMA BY IMMUNOASSAY: NON REACTIVE
WBC # BLD AUTO: 6.1 X10E3/UL (ref 3.4–10.8)

## 2020-08-21 NOTE — PROGRESS NOTES
The results were reviewed. Positive ISIDORO 1:1280 (centromere), rheumatoid factor 50.4. All remaining labs are normal/negative.

## 2021-02-11 ENCOUNTER — OFFICE VISIT (OUTPATIENT)
Dept: FAMILY MEDICINE CLINIC | Age: 36
End: 2021-02-11
Payer: MEDICAID

## 2021-02-11 VITALS
SYSTOLIC BLOOD PRESSURE: 108 MMHG | BODY MASS INDEX: 25.83 KG/M2 | HEART RATE: 71 BPM | HEIGHT: 65 IN | TEMPERATURE: 97.1 F | DIASTOLIC BLOOD PRESSURE: 69 MMHG | OXYGEN SATURATION: 99 % | WEIGHT: 155 LBS

## 2021-02-11 DIAGNOSIS — J45.20 MILD INTERMITTENT ASTHMA WITHOUT COMPLICATION: ICD-10-CM

## 2021-02-11 DIAGNOSIS — M05.80 POLYARTHRITIS WITH POSITIVE RHEUMATOID FACTOR (HCC): Primary | ICD-10-CM

## 2021-02-11 DIAGNOSIS — M25.461 SWELLING OF JOINT OF RIGHT KNEE: ICD-10-CM

## 2021-02-11 PROCEDURE — 99214 OFFICE O/P EST MOD 30 MIN: CPT | Performed by: NURSE PRACTITIONER

## 2021-02-11 RX ORDER — ALBUTEROL SULFATE 90 UG/1
2 AEROSOL, METERED RESPIRATORY (INHALATION)
Qty: 2 INHALER | Refills: 3 | Status: SHIPPED | OUTPATIENT
Start: 2021-02-11 | End: 2022-03-07 | Stop reason: SDUPTHER

## 2021-02-11 RX ORDER — CLINDAMYCIN PHOSPHATE AND BENZOYL PEROXIDE 10; 50 MG/G; MG/G
GEL TOPICAL
Qty: 1 TUBE | Refills: 3 | Status: SHIPPED | OUTPATIENT
Start: 2021-02-11 | End: 2022-03-07 | Stop reason: SDUPTHER

## 2021-02-11 RX ORDER — ALBUTEROL SULFATE 0.83 MG/ML
2.5 SOLUTION RESPIRATORY (INHALATION)
Qty: 30 NEBULE | Refills: 1 | Status: SHIPPED | OUTPATIENT
Start: 2021-02-11 | End: 2022-03-07 | Stop reason: SDUPTHER

## 2021-02-11 NOTE — PROGRESS NOTES
[facility] Note    Prince Cho is a 28 y.o. female who was seen in clinic today (2/11/2021). Subjective:  Cardiovascular Review:  The patient has no known cardiovascular conditions. Diet and Lifestyle: generally follows a low fat low cholesterol diet, generally follows a low sodium diet, sedentary, smoker 1 pack per week. Pertinent ROS: no TIA's, no chest pain on exertion, no dyspnea on exertion, no swelling of ankles. Patient reports stability of her asthma symptoms. Osteoarthritis and Chronic Pain:  Patient has arthralgias, primarily affecting the knees and joints. Patient also reports changing in her finger color and pain with cold exposure. Symptoms onset: several years ago. Rheumatological ROS: joint swelling in knees. Response to treatment plan: symptoms have progressed to a point and plateaued. Patient previously seen by rheumatology. She was being monitored for rheumatoid arthritis given her positive rheumatoid factor. Prior to Admission medications    Medication Sig Start Date End Date Taking? Authorizing Provider   albuterol (ProAir HFA) 90 mcg/actuation inhaler Take 2 Puffs by inhalation every four (4) hours as needed for Wheezing. 2/11/21  Yes Omid Sainz NP   albuterol (PROVENTIL VENTOLIN) 2.5 mg /3 mL (0.083 %) nebu 3 mL by Nebulization route every four (4) hours as needed (wheezing). 2/11/21  Yes Oimd Sainz NP   clindamycin-benzoyl Peroxide (Duac) 1.2 %(1 % base) -5 % SR topical gel Apply  to affected area nightly. 2/11/21  Yes Omid Sainz NP   clindamycin-benzoyl Peroxide (Duac) 1.2 %(1 % base) -5 % SR topical gel Apply  to affected area nightly. 5/26/20 2/11/21  Zoe Yepez NP   famotidine (PEPCID) 40 mg tablet Take 1 Tab by mouth daily as needed (acid reflux).  12/31/19   Zoe Yepez NP   albuterol (PROVENTIL VENTOLIN) 2.5 mg /3 mL (0.083 %) nebu 3 mL by Nebulization route every four (4) hours as needed (wheezing). 12/31/19 2/11/21  Doug Khan NP   albuterol (PROAIR HFA) 90 mcg/actuation inhaler Take 2 Puffs by inhalation every four (4) hours as needed for Wheezing. 7/9/19 2/11/21  Doug Khan NP          Allergies   Allergen Reactions    Other Plant, Animal, Environmental Hives and Rash     Animal fur    Tomato Hives           ROS  See HPI    Objective:   Physical Exam  Vitals signs and nursing note reviewed. Constitutional:       Appearance: She is well-developed. Cardiovascular:      Rate and Rhythm: Normal rate and regular rhythm. Heart sounds: No murmur. No friction rub. No gallop. Pulmonary:      Effort: Pulmonary effort is normal. No respiratory distress. Breath sounds: Normal breath sounds. Neurological:      Mental Status: She is alert and oriented to person, place, and time. Psychiatric:         Behavior: Behavior normal.         Thought Content: Thought content normal.         Judgment: Judgment normal.           Visit Vitals  /69 (BP 1 Location: Left upper arm, BP Patient Position: Sitting, BP Cuff Size: Small adult)   Pulse 71   Temp 97.1 °F (36.2 °C) (Temporal)   Ht 5' 5\" (1.651 m)   Wt 155 lb (70.3 kg)   LMP 02/10/2021 (Exact Date)   SpO2 99%   BMI 25.79 kg/m²       Assessment & Plan:  Diagnoses and all orders for this visit:    1. Polyarthritis with positive rheumatoid factor [facility])  Patient request second opinion. Referral provided to patient.  -     REFERRAL TO RHEUMATOLOGY    2. Swelling of joint of right knee  To rheumatoid arthritis however request orthopedic evaluation to make sure there is no underlying structural issues. -     REFERRAL TO ORTHOPEDICS    3. Mild intermittent asthma without complication  Stable at present, no changes. -     albuterol (ProAir HFA) 90 mcg/actuation inhaler;  Take 2 Puffs by inhalation every four (4) hours as needed for Wheezing.  -     albuterol (PROVENTIL VENTOLIN) 2.5 mg /3 mL (0.083 %) nebu; 3 mL by Nebulization route every four (4) hours as needed (wheezing). Other orders  -     clindamycin-benzoyl Peroxide (Duac) 1.2 %(1 % base) -5 % SR topical gel; Apply  to affected area nightly. I have discussed the diagnosis with the patient and the intended plan as seen in the above orders. The patient has received an after-visit summary along with patient information handout. I have discussed medication side effects and warnings with the patient as well. Follow-up and Dispositions    · Return if symptoms worsen or fail to improve.            Mariana Cruz, NP

## 2021-02-11 NOTE — LETTER
NOTIFICATION RETURN TO WORK / SCHOOL 
 
2/11/2021 9:00 AM 
 
Ms. Salma Perdue [address] [facility] [address] To Whom It May Concern: 
 
Salma Perdue is currently under the care of EMMIE Ferris 53. She will return to work/school on: 2/12/21 If there are questions or concerns please have the patient contact our office. Sincerely, Tala Conroy NP

## 2021-02-11 NOTE — PATIENT INSTRUCTIONS
Asthma: Your Action Plan  Sample Action Plan     Controller medicine action plan  Fill in the blank spaces and boxes that apply for all sections. · Name of your controller medicine:  ? ____________________________________________  · How much of this medicine do you take? ? ____________________________________________  · How often do you take this medicine? ? ____________________________________________  · Other instructions? ? ____________________________________________  Quick-relief medicine action plan  · Name of your quick-relief medicine:  ? ____________________________________________  · How much of this medicine do you take? ? ____________________________________________  · How often do you take this medicine? ? ____________________________________________  Asthma Zones  GREEN ZONE: This is where you want to be! Green zone symptoms   · You have no shortness of breath or chest tightness. You are not coughing or wheezing. · You can do all of your usual activities. · You sleep well at night. Green zone peak flow (if you use a peak flow meter)  · ______ or more (80% or more of your personal best)  Green zone actions (Check the boxes and fill in the blank spaces that apply.)  [ ] You take your controller medicine(s) every day. [ ] Toan Odell are staying away from your asthma triggers. [ ] You take quick-relief medicine (called _____________________) ______ minutes before exercise. YELLOW ZONE: Your asthma is getting worse. Yellow zone symptoms   · You are short of breath or have chest tightness. You are coughing or wheezing. · You have symptoms that keep you up at night. · You can do some, but not all, of your usual activities. Yellow zone peak flow (if you use a peak flow meter)  · ______ to ______ (50% to 79% of your personal best)  Yellow zone actions (Check the boxes and fill in the blank spaces that apply.)  [ ] Take _____ puff(s) of quick-relief medicine called ______________________. Repeat _____ times. [ ] If your symptoms don't get better or your peak flow has not returned to the green zone in 1 hour, then:  · [ ] Take _____ puff(s) of medicine called ______________________. Take it ____ times a day. · [ ] Begin or increase treatment with corticosteroid pills. Take ______ mg of medicine called ____________________________ every __________. · [ ] Call your doctor at this number: ____________________. RED ZONE: Danger! Red zone symptoms   · You are very short of breath. · You can't do your usual activities. · Quick-relief medicine doesn't help. Or your symptoms don't get better after 24 hours in the yellow zone. Red zone peak flow (if you use a peak flow meter)  · Less than _______ (less than 50% of your personal best)  Red zone actions (Check the boxes and fill in the blank spaces that apply.)  [ ] Take _____ puff(s) of quick-relief medicine called ____________________________. Repeat ______ times. [ ] Begin or increase treatment with corticosteroid pills. Take ________ mg now. [ ] Call your doctor at this number: _________________. If you can't contact your doctor, go to the emergency department. Call 911 or ___________________. [ ] Other numbers you might call are: ___________________________________. When should you call for help? Call 911 anytime you think you may need emergency care. For example, call if:    · You have severe trouble breathing. Call your doctor now or seek immediate medical care if:    · You are in the red zone of your asthma action plan.     · You've used your quick-relief medicine but are still having trouble breathing.     · You cough up blood.     · You have new or worse trouble breathing.     · You cough up dark brown or bloody mucus (sputum).    Watch closely for changes in your health, and be sure to contact your doctor if:    · You need to use quick-relief medicine more than 2 days each week (unless it's just for exercise).     · Your coughing and wheezing get worse. Follow-up care is a key part of your treatment and safety. Be sure to make and go to all appointments, and call your doctor if you are having problems. It's also a good idea to know your test results and keep a list of the medicines you take. Where can you learn more? Go to http://www.gray.com/  Enter H178 in the search box to learn more about \"Asthma: Your Action Plan. \"  Current as of: February 24, 2020               Content Version: 12.6  © 2006-2020 [employer]. Care instructions adapted under license by [employer] (which disclaims liability or warranty for this information). If you have questions about a medical condition or this instruction, always ask your healthcare professional. [address] any warranty or liability for your use of this information.

## 2021-02-11 NOTE — PROGRESS NOTES
Chief Complaint   Patient presents with    Knee Pain     knees start to swell down to feet x every other day. Mainly right knee.  Finger Pain     fingers has been locking up. Had blood work done       1. Have you been to the ER, urgent care clinic since your last visit? Hospitalized since your last visit? No    2. Have you seen or consulted any other health care providers outside of the [address] [place] [address] since your last visit? Include any pap smears or colon screening. No    3 most recent PHQ Screens 2/11/2021   Little interest or pleasure in doing things Not at all   Feeling down, depressed, irritable, or hopeless Not at all   Total Score PHQ 2 0     Due forpneum vaccine    Patient states she does not get flu vaccine.   Due for t-dap Myalgia Monitoring: I explained this is common when taking isotretinoin. If this worsens they will contact us.

## 2021-03-30 ENCOUNTER — OFFICE VISIT (OUTPATIENT)
Dept: FAMILY MEDICINE CLINIC | Age: 36
End: 2021-03-30
Payer: MEDICAID

## 2021-03-30 VITALS
TEMPERATURE: 99.3 F | RESPIRATION RATE: 18 BRPM | WEIGHT: 153.8 LBS | DIASTOLIC BLOOD PRESSURE: 56 MMHG | HEIGHT: 65 IN | BODY MASS INDEX: 25.62 KG/M2 | OXYGEN SATURATION: 98 % | HEART RATE: 86 BPM | SYSTOLIC BLOOD PRESSURE: 91 MMHG

## 2021-03-30 DIAGNOSIS — K04.7 TOOTH ABSCESS: Primary | ICD-10-CM

## 2021-03-30 PROCEDURE — 99213 OFFICE O/P EST LOW 20 MIN: CPT | Performed by: NURSE PRACTITIONER

## 2021-03-30 RX ORDER — AMOXICILLIN 500 MG/1
500 CAPSULE ORAL 3 TIMES DAILY
Qty: 30 CAP | Refills: 0 | Status: SHIPPED | OUTPATIENT
Start: 2021-03-30 | End: 2021-04-09

## 2021-03-30 NOTE — PROGRESS NOTES
[facility] Note  Subjective:      Radames Bermudez is a 28 y.o. female who presents for with pain on left lower toothache. She believes that it is infection. She doesn't have dentist.  Past Medical History:   Diagnosis Date    Asthma     Ovarian cyst      History reviewed. No pertinent surgical history. Current Outpatient Medications   Medication Sig Dispense Refill    amoxicillin (AMOXIL) 500 mg capsule Take 1 Cap by mouth three (3) times daily for 10 days. 30 Cap 0    albuterol (ProAir HFA) 90 mcg/actuation inhaler Take 2 Puffs by inhalation every four (4) hours as needed for Wheezing. 2 Inhaler 3    albuterol (PROVENTIL VENTOLIN) 2.5 mg /3 mL (0.083 %) nebu 3 mL by Nebulization route every four (4) hours as needed (wheezing). 30 Nebule 1    clindamycin-benzoyl Peroxide (Duac) 1.2 %(1 % base) -5 % SR topical gel Apply  to affected area nightly. 1 Tube 3    famotidine (PEPCID) 40 mg tablet Take 1 Tab by mouth daily as needed (acid reflux). 30 Tab 3     Allergies   Allergen Reactions    Other Plant, Animal, Environmental Hives and Rash     Animal fur    Tomato Hives       ROS:   Complete review of systems was reviewed with pertinent information listed in HPI. Review of Systems   Constitutional: Negative. HENT:        Pain in tooth #32   Respiratory: Negative. Cardiovascular: Negative. Gastrointestinal: Negative. Genitourinary: Negative. Objective:     Visit Vitals  BP (!) 91/56 (BP 1 Location: Left upper arm, BP Patient Position: Sitting, BP Cuff Size: Small adult)   Pulse 86   Temp 99.3 °F (37.4 °C) (Temporal)   Resp 18   Ht 5' 5\" (1.651 m)   Wt 153 lb 12.8 oz (69.8 kg)   LMP 03/07/2021 (Exact Date)   SpO2 98%   BMI 25.59 kg/m²       Vitals and Nurse Documentation reviewed. Physical Exam  Constitutional:       Appearance: Normal appearance. She is normal weight.    HENT:      Mouth/Throat:      Mouth: Mucous membranes are moist.      Comments: Redness and mild swelling of gum around tooth #32  Left lower gum  Neck:      Musculoskeletal: Normal range of motion and neck supple. Cardiovascular:      Rate and Rhythm: Normal rate and regular rhythm. Pulses: Normal pulses. Heart sounds: Normal heart sounds. No murmur. Pulmonary:      Effort: Pulmonary effort is normal.      Breath sounds: Normal breath sounds. Abdominal:      General: Bowel sounds are normal.      Palpations: Abdomen is soft. Neurological:      Mental Status: She is alert. Psychiatric:         Mood and Affect: Mood normal.         Thought Content: Thought content normal.         Assessment/Plan:     Diagnoses and all orders for this visit:    1. Tooth abscess  -     amoxicillin (AMOXIL) 500 mg capsule; Take 1 Cap by mouth three (3) times daily for 10 days.     Advised to take motrin for pain  Name and numbers of DR Edy Gonzales, oral surgeon  And cool smiles given to Pt to call and make appointment  She will also try [facility] dental [facility]       Pt expressed understanding with the diagnosis and plan        Discussed expected course/resolution/complications of diagnosis in detail with patient.    Medication risks/benefits/costs/interactions/alternatives discussed with patient.    Pt was given an after visit summary which includes diagnoses, current medications & vitals.  Pt expressed understanding with the diagnosis and plan

## 2021-03-30 NOTE — PROGRESS NOTES
Chief Complaint   Patient presents with    Dental Pain     left wisdom tooth       1. Have you been to the ER, urgent care clinic since your last visit? Hospitalized since your last visit? No    2. Have you seen or consulted any other health care providers outside of the [address] [place] [address] since your last visit? Include any pap smears or colon screening.  No4    3 most recent PHQ Screens 3/30/2021   Little interest or pleasure in doing things Not at all   Feeling down, depressed, irritable, or hopeless Not at all   Total Score PHQ 2 0

## 2021-04-09 ENCOUNTER — TELEPHONE (OUTPATIENT)
Dept: FAMILY MEDICINE CLINIC | Age: 36
End: 2021-04-09

## 2021-04-09 NOTE — TELEPHONE ENCOUNTER
----- Message from Baltazar Catherine sent at 4/9/2021  7:51 AM EDT -----  Regarding: Emeli/Telephone  General Message/Vendor Calls    Caller's first and last name: Romario Rouse      Reason for call: Pt did not Disclose      Callback required yes/no and why: yes      Best contact number(s): [phone]      Details to clarify the request: Pt would like a call back from JESUS Sainz.        Baltazar Catherine

## 2021-04-14 NOTE — TELEPHONE ENCOUNTER
TC to Patient. ID verified. Calling to inquire the reason for her call. Patient advises she called 2 weeks ago and now does not remember why she called now.

## 2021-08-04 ENCOUNTER — OFFICE VISIT (OUTPATIENT)
Dept: FAMILY MEDICINE CLINIC | Age: 36
End: 2021-08-04
Payer: MEDICAID

## 2021-08-04 ENCOUNTER — TELEPHONE (OUTPATIENT)
Dept: FAMILY MEDICINE CLINIC | Age: 36
End: 2021-08-04

## 2021-08-04 VITALS
OXYGEN SATURATION: 99 % | HEIGHT: 65 IN | SYSTOLIC BLOOD PRESSURE: 112 MMHG | BODY MASS INDEX: 25.79 KG/M2 | WEIGHT: 154.8 LBS | RESPIRATION RATE: 16 BRPM | HEART RATE: 82 BPM | DIASTOLIC BLOOD PRESSURE: 64 MMHG

## 2021-08-04 DIAGNOSIS — K21.9 GASTROESOPHAGEAL REFLUX DISEASE, UNSPECIFIED WHETHER ESOPHAGITIS PRESENT: ICD-10-CM

## 2021-08-04 DIAGNOSIS — M05.80 POLYARTHRITIS WITH POSITIVE RHEUMATOID FACTOR (HCC): Primary | ICD-10-CM

## 2021-08-04 DIAGNOSIS — R76.8 POSITIVE ANA (ANTINUCLEAR ANTIBODY): ICD-10-CM

## 2021-08-04 PROBLEM — J45.20 MILD INTERMITTENT ASTHMA WITHOUT COMPLICATION: Status: ACTIVE | Noted: 2021-08-04

## 2021-08-04 PROCEDURE — 99214 OFFICE O/P EST MOD 30 MIN: CPT | Performed by: FAMILY MEDICINE

## 2021-08-04 RX ORDER — FAMOTIDINE 20 MG/1
20 TABLET, FILM COATED ORAL AS NEEDED
COMMUNITY
End: 2021-08-04 | Stop reason: DRUGHIGH

## 2021-08-04 RX ORDER — IBUPROFEN 200 MG
400 TABLET ORAL
COMMUNITY

## 2021-08-04 RX ORDER — FAMOTIDINE 40 MG/1
40 TABLET, FILM COATED ORAL DAILY
Qty: 90 TABLET | Refills: 0 | Status: SHIPPED | OUTPATIENT
Start: 2021-08-04 | End: 2022-03-07 | Stop reason: SDUPTHER

## 2021-08-04 NOTE — PROGRESS NOTES
Chief Complaint   Patient presents with    Joint Pain     hands, fingers, ankles, knees    Heartburn     acid reflux     1. Have you been to the ER, urgent care clinic since your last visit? Hospitalized since your last visit? No    2. Have you seen or consulted any other health care providers outside of the [address] [place] [address] since your last visit? Include any pap smears or colon screening.  No

## 2021-08-04 NOTE — PROGRESS NOTES
Chief Complaint   Patient presents with    Joint Pain     hands, fingers, ankles, knees    Heartburn     acid reflux       HISTORY OF PRESENT ILLNESS   HPI  Prior patient of Anisa Chamorro seen today w/ worsening multiple joint pain. Joints involved include B hands, knees, and ankles. Her fingers and knees are stiff. Sometimes her fingers feel like they lock and get stuck. Knees and ankles occasionally swell and get really swollen. She applies otc arthritis creams or CBD oils. She does not take anything orally for this specifically but does take Motrin 400 mg once a day for the first 2-3 days of her menstrual cycle for cramps. She has seen Rema Stevens for these complaints in the past.  She was referred to Rheumatology a few times. She was seen by Dr. Mireya Mandel in 8-2020. She had an extensive panel of labs done. She did not follow up after not hearing back from that office. She assumed maybe \"no news was good news\". However her complaints have persisted. She does try to exercise every day to counter this. Maintains her wt. Tries to follow balanced diet. Upon chart review she did have elevated RF and ISIDORO. She is seeing gyn and fertility specialist as she and her fiance have been unable to conceive since trying close to 2 yrs ago. She had a recent HSG and is awaiting that result. She also reports ~ 2 yr h/o acid reflux and heartburn. She saw Rema Stevens for this back in 2019 at which time he prescribed Pepcid 40 mg daily. She took it routinely initially then spaced to only as needed when triggered by certain foods. She then changed to OTC Pepcid 20 mg about a year ago and has used that prn from time to time. She has identified some triggers and generally avoids those. But now the past 2 months, she gets acid reflux and burning in her chest and throat more frequently and often times w/o any identifiable trigger.   Denies sore throat, cough, chest pain, sob, dysphagia, hoarseness, nausea, vomiting or abdominal pain.      REVIEW OF SYMPTOMS   Review of Systems   Constitutional: Negative. Negative for chills, fever and weight loss. Respiratory: Negative. Cardiovascular: Negative. Gastrointestinal: Negative for abdominal pain, blood in stool, melena, nausea and vomiting. Musculoskeletal: Positive for joint pain. Neurological: Negative. Psychiatric/Behavioral: Negative. PROBLEM LIST/MEDICAL HISTORY     Problem List  Date Reviewed: 8/4/2021        Codes Class Noted    Mild intermittent asthma without complication SGD-22-EO: R45.86  ICD-9-CM: 493.90  8/4/2021        Ovarian cyst ICD-10-CM: N83.209  ICD-9-CM: 620.2  Unknown        GERD (gastroesophageal reflux disease) ICD-10-CM: K21.9  ICD-9-CM: 530.81  8/4/2021    Overview Signed 8/4/2021  5:07 PM by Lexie Hastings MD     2019             Infertility, female ICD-10-CM: N97.9  ICD-9-CM: 628.9  Unknown    Overview Signed 8/4/2021  5:08 PM by Lexie Hastings MD     2019, referred to [address] [place] [address] Osteopathy   History reviewed. No pertinent surgical history. MEDICATIONS     Current Outpatient Medications   Medication Sig    ibuprofen (Motrin IB) 200 mg tablet Take 400 mg by mouth daily as needed for Pain. Only during her menstrual cycles for the first 2-3 days    famotidine (Pepcid) 20 mg tablet Take 20 mg by mouth as needed. Takes OTC 2-3 x a week    ascorbic acid (VITAMIN C PO) Take  by mouth daily.  PNV YO.86/QFOWGYD fum/folic ac (PRENATAL PO) Take  by mouth daily.  albuterol (ProAir HFA) 90 mcg/actuation inhaler Take 2 Puffs by inhalation every four (4) hours as needed for Wheezing.  albuterol (PROVENTIL VENTOLIN) 2.5 mg /3 mL (0.083 %) nebu 3 mL by Nebulization route every four (4) hours as needed (wheezing).  clindamycin-benzoyl Peroxide (Duac) 1.2 %(1 % base) -5 % SR topical gel Apply  to affected area nightly. No current facility-administered medications for this visit. ALLERGIES     Allergies   Allergen Reactions    Other Plant, Animal, Environmental Hives and Rash     Animal fur    Tomato Hives          SOCIAL HISTORY     Social History     Tobacco Use    Smoking status: Current Some Day Smoker     Packs/day: 0.25     Years: 10.00     Pack years: 2.50     Types: Cigarettes    Smokeless tobacco: Never Used    Tobacco comment: 1 cigarette a day   Substance Use Topics    Alcohol use: Yes     Comment: 2 drinks a month     Social History     Social History Narrative    Engaged, 2 stepsons, 1 stepdaughter     at an [employer]        Diet: no red meat since 5-2021; eats fish, lots of vegetables and fruits; still some sweets    Exercise: daily: jumping jacks, stretches, situps, squats, light wts, walks dog bid x 1 mile each    Caffeine: no coffee, tea ~ once a week, ginger ale once a week    Weight: 150's over time         Social History     Substance and Sexual Activity   Sexual Activity Yes    Partners: Male    Birth control/protection: None    Comment: trying to conceive       IMMUNIZATIONS     Immunization History   Administered Date(s) Administered    DTaP 1985, 1985, 1985, 12/17/1986, 07/16/1990    Hep B Vaccine 08/24/1998, 09/21/1998, 02/08/1999    MMR 10/29/1986, 10/04/1993    Poliovirus vaccine 1985, 1985, 1985, 07/16/1990    Td 08/24/1998    Td, Adsorbed 11/14/2019    Tdap 04/08/2014         FAMILY HISTORY     Family History   Problem Relation Age of Onset    Hypertension Mother     Breast Cancer Mother         breast     No Known Problems Father     Hypertension Sister     No Known Problems Brother     Breast Cancer Maternal Grandmother     Breast Cancer Maternal Aunt          VITALS     Visit Vitals  /64 (BP 1 Location: Left upper arm, BP Patient Position: Sitting, BP Cuff Size: Adult)   Pulse 82   Resp 16   Ht 5' 5\" (1.651 m)   Wt 154 lb 12.8 oz (70.2 kg)   LMP 07/18/2021   SpO2 99%   BMI 25.76 kg/m²          PHYSICAL EXAMINATION   Physical Exam  Vitals reviewed. Constitutional:       General: She is not in acute distress. Appearance: Normal appearance. She is not ill-appearing. HENT:      Right Ear: Tympanic membrane normal.      Left Ear: Tympanic membrane normal.   Eyes:      Conjunctiva/sclera: Conjunctivae normal.   Cardiovascular:      Rate and Rhythm: Normal rate and regular rhythm. Heart sounds: Normal heart sounds. Pulmonary:      Effort: Pulmonary effort is normal.      Breath sounds: Normal breath sounds. Abdominal:      General: Abdomen is flat. Bowel sounds are normal. There is no distension. Palpations: Abdomen is soft. There is no mass. Tenderness: There is no abdominal tenderness. Musculoskeletal:         General: No swelling or tenderness. Normal range of motion. Right lower leg: No edema. Left lower leg: No edema. Skin:     General: Skin is warm and dry. Neurological:      General: No focal deficit present. Mental Status: She is alert and oriented to person, place, and time.       Gait: Gait normal.   Psychiatric:         Mood and Affect: Mood normal.                LABORATORY DATA/ANCILLARY/IMAGING     Results for orders placed or performed in visit on 08/18/20   ANTINUCLEAR ANTIBODIES, IFA   Result Value Ref Range    Antinuclear Abs, IFA Positive (A)    SJOGREN'S ABS, SSA AND SSB   Result Value Ref Range    Sjogren's Anti-SS-A <0.2 0.0 - 0.9 AI    Sjogren's Anti-SS-B <0.2 0.0 - 0.9 AI   SMITH ANTIBODIES   Result Value Ref Range    Smith Abs <0.2 0.0 - 0.9 AI   CHRONIC HEPATITIS PANEL   Result Value Ref Range    Hep B surface Ag screen Negative Negative    Hepatitis Be Antigen Negative Negative    Hep B Core Ab, IgM Negative Negative    Hep B Core Ab, total Negative Negative    Hepatitis Be Antibody Negative Negative    HEP B SURFACE AB, QUAL Reactive     HCV Ab <0.1 0.0 - 0.9 s/co ratio   COMPLEMENT, CH50, TOTAL   Result Value Ref Range    Complement, Total (CH50) >60 >41 U/mL   T PALLIDUM SCREEN W/REFLEX   Result Value Ref Range    T PALLIDUM AB Non Reactive Non Reactive   BETA-2 GLYCOPROTEIN I ABS   Result Value Ref Range    Beta-2 Glycoprotein I Ab, IgG <9 0 - 20 GPI IgG units    Beta-2 Glycoprotein I Ab, IgA 19 0 - 25 GPI IgA units    Beta-2 Glycoprotein I Ab, IgM 27 0 - 32 GPI IgM units   CARDIOLIPIN AB PANEL   Result Value Ref Range    ANTICARDIOLIPIN AB,IGG,QN <9 0 - 14 GPL U/mL    ANTICARDIOLIPIN AB,IGM,QN <9 0 - 12 MPL U/mL    ANTICARDIOLIPIN AB,IGA,QN <9 0 - 11 APL U/mL   COMPLEMENT, C3 & C4   Result Value Ref Range    C3 Complement 95 82 - 167 mg/dL    C4 Complement 20 14 - 44 mg/dL   CBC WITH AUTOMATED DIFF   Result Value Ref Range    WBC 6.1 3.4 - 10.8 x10E3/uL    RBC 3.93 3.77 - 5.28 x10E6/uL    HGB 12.5 11.1 - 15.9 g/dL    HCT 35.9 34.0 - 46.6 %    MCV 91 79 - 97 fL    MCH 31.8 26.6 - 33.0 pg    MCHC 34.8 31.5 - 35.7 g/dL    RDW 11.8 11.7 - 15.4 %    PLATELET 913 634 - 506 x10E3/uL    NEUTROPHILS 49 Not Estab. %    Lymphocytes 32 Not Estab. %    MONOCYTES 9 Not Estab. %    EOSINOPHILS 9 Not Estab. %    BASOPHILS 1 Not Estab. %    ABS. NEUTROPHILS 3.0 1.4 - 7.0 x10E3/uL    Abs Lymphocytes 2.0 0.7 - 3.1 x10E3/uL    ABS. MONOCYTES 0.6 0.1 - 0.9 x10E3/uL    ABS. EOSINOPHILS 0.5 (H) 0.0 - 0.4 x10E3/uL    ABS. BASOPHILS 0.0 0.0 - 0.2 x10E3/uL    IMMATURE GRANULOCYTES 0 Not Estab. %    ABS. IMM.  GRANS. 0.0 0.0 - 0.1 [id number]   METABOLIC PANEL, COMPREHENSIVE   Result Value Ref Range    Glucose 103 (H) 65 - 99 mg/dL    BUN 9 6 - 20 mg/dL    Creatinine 0.93 0.57 - 1.00 mg/dL    GFR est non-AA 80 >59 mL/min/1.73    GFR est AA 92 >59 mL/min/1.73    BUN/Creatinine ratio 10 9 - 23    Sodium 141 134 - 144 mmol/L    Potassium 4.9 3.5 - 5.2 mmol/L    Chloride 104 96 - 106 mmol/L    CO2 25 20 - 29 mmol/L    Calcium 9.3 8.7 - 10.2 mg/dL    Protein, total 7.1 6.0 - 8.5 g/dL    Albumin 4.5 3.8 - 4.8 g/dL    GLOBULIN, TOTAL 2.6 1.5 - 4.5 g/dL    A-G Ratio 1.7 1.2 - 2.2    Bilirubin, total 0.3 0.0 - 1.2 mg/dL    Alk. phosphatase 67 39 - 117 IU/L    AST (SGOT) 11 0 - 40 IU/L    ALT (SGPT) 9 0 - 32 IU/L   C REACTIVE PROTEIN, QT   Result Value Ref Range    C-Reactive Protein, Qt 3 0 - 10 mg/L   DSDNA ANTIBODY BY IFA, CRITHIDIA LUCILIAE, WITH REFLEX TO TITER   Result Value Ref Range    dsDNA Crithidia luciliae IFA Negative Negative   SED RATE (ESR)   Result Value Ref Range    Sed rate (ESR) 14 0 - 32 mm/hr   LUPUS ANTICOAGULANT PANEL W/ REFLEX   Result Value Ref Range    PTT-LA 33.4 0.0 - 51.9 sec    dRVVT 29.5 0.0 - 47.0 sec    Interpretation Comment:    PROTEIN ELECTROPHORESIS W/ REFLX SATISH   Result Value Ref Range    Albumin 4.0 2.9 - 4.4 g/dL    Alpha-1-globulin 0.2 0.0 - 0.4 g/dL    ALPHA-2 GLOBULIN 0.6 0.4 - 1.0 g/dL    Beta globulin 0.9 0.7 - 1.3 g/dL    Gamma globulin 1.4 0.4 - 1.8 g/dL    M-Ronaldo Not Observed Not Observed g/dL    Globulin, total 3.1 2.2 - 3.9 g/dL    A/G ratio 1.3 0.7 - 1.7    Please note Comment     Interpretation (see below) Comment    IMMUNOGLOBULINS, G/A/M, QT.    Result Value Ref Range    Immunoglobulin G, Qt. 1,344 586 - 1,602 mg/dL    Immunoglobulin A, Qt. 329 87 - 352 mg/dL    Immunoglobulin M, Qt. 92 26 - 217 mg/dL   RHEUMATOID FACTOR, QL   Result Value Ref Range    Rheumatoid factor 50.4 (H) 0.0 - 76.4 IU/mL   CYCLIC CITRUL PEPTIDE AB, IGG   Result Value Ref Range    CCP Antibodies IgG/IgA 6 0 - 19 units   COMMENT   Result Value Ref Range    Comment Comment    ANTINUCLEAR AB PATTERNS   Result Value Ref Range    Centromere Pattern 1:1280 (H)     NOTE Comment    DIRECT DAVY   Result Value Ref Range    Davy', Direct Negative Negative       Lab Results   Component Value Date/Time    WBC 6.1 08/18/2020 10:08 AM    HGB 12.5 08/18/2020 10:08 AM    Hemoglobin (POC) 12.6 11/07/2012 09:49 PM    HCT 35.9 08/18/2020 10:08 AM    Hematocrit (POC) 37 11/07/2012 09:49 PM    PLATELET 621 81/07/5189 10:08 AM    MCV 91 08/18/2020 10:08 AM     Lab Results   Component Value Date/Time    TSH 0.689 11/14/2019 10:23 AM    T4, Free 1.20 11/14/2019 10:23 AM      Lab Results   Component Value Date/Time    Sodium 141 08/18/2020 10:08 AM    Potassium 4.9 08/18/2020 10:08 AM    Chloride 104 08/18/2020 10:08 AM    CO2 25 08/18/2020 10:08 AM    Anion gap 4 (L) 05/12/2017 03:48 PM    Glucose 103 (H) 08/18/2020 10:08 AM    BUN 9 08/18/2020 10:08 AM    Creatinine 0.93 08/18/2020 10:08 AM    BUN/Creatinine ratio 10 08/18/2020 10:08 AM    GFR est AA 92 08/18/2020 10:08 AM    GFR est non-AA 80 08/18/2020 10:08 AM    Calcium 9.3 08/18/2020 10:08 AM    Bilirubin, total 0.3 08/18/2020 10:08 AM    ALT (SGPT) 9 08/18/2020 10:08 AM    Alk. phosphatase 67 08/18/2020 10:08 AM    Protein, total 7.1 08/18/2020 10:08 AM    Albumin 4.5 08/18/2020 10:08 AM    Globulin 3.6 05/12/2017 03:48 PM    A-G Ratio 1.7 08/18/2020 10:08 AM          ASSESSMENT & PLAN       ICD-10-CM ICD-9-CM    1. Polyarthritis with positive rheumatoid factor (HCC)  M05.80 714.0    2. Positive ISIDORO (antinuclear antibody)  R76.8 795.79    3. Gastroesophageal reflux disease, unspecified whether esophagitis present  K21.9 530.81 famotidine (PEPCID) 40 mg tablet     Patient directed to follow up w/ Dr. Yanira Miller at this time in regards to multiple joint pain and +RF/+ISIDORO. Seeing fertility specialist and currently on prenatal vitamins. Undergoing fertility workup at this time. For GERD, recommend Nsaid avoidance for now and use Tylenol Arthritis bid prn. Continue dietary trigger avoidance.   Change over to Pepcid 40 mg daily x 6-8 weeks and use Tums prn  Reflux precautions and home care counseling  Reviewed medications and side effects   Follow up if symptoms persist beyond expectant course, sooner if anything worsens in the interim

## 2021-08-04 NOTE — TELEPHONE ENCOUNTER
Karli Banks Kinds! I saw this patient of Sharon Beckford whom you saw back in 8-2020 for multiple joint pain. You ran lab panels on her and it appears her RF and ISIDORO were elevated. Patient states she was not contacted by anyone and tried to reach your office back then for results and didn't hear back. She is in the office to see me today complaining of worsening joint pain. She is also trying to conceive, having some fertility issues which may be related to her autoimmune issue and also would be a high risk pregnancy if she has related conditions. I have asked her to follow up w/ you asap. Could you facilitate getting her worked in for an appointment w/ you asap? Thanks for your help!  Mary

## 2021-09-07 ENCOUNTER — OFFICE VISIT (OUTPATIENT)
Dept: FAMILY MEDICINE CLINIC | Age: 36
End: 2021-09-07
Payer: MEDICAID

## 2021-09-07 VITALS
DIASTOLIC BLOOD PRESSURE: 64 MMHG | WEIGHT: 152.4 LBS | RESPIRATION RATE: 16 BRPM | SYSTOLIC BLOOD PRESSURE: 102 MMHG | HEART RATE: 61 BPM | BODY MASS INDEX: 25.39 KG/M2 | OXYGEN SATURATION: 99 % | HEIGHT: 65 IN

## 2021-09-07 DIAGNOSIS — N64.4 PAIN OF RIGHT BREAST: Primary | ICD-10-CM

## 2021-09-07 DIAGNOSIS — Z80.3 FAMILY HISTORY OF BREAST CANCER IN MOTHER: ICD-10-CM

## 2021-09-07 PROCEDURE — 99213 OFFICE O/P EST LOW 20 MIN: CPT | Performed by: FAMILY MEDICINE

## 2021-09-07 RX ORDER — BISMUTH SUBSALICYLATE 262 MG
1 TABLET,CHEWABLE ORAL DAILY
COMMUNITY

## 2021-09-07 NOTE — PROGRESS NOTES
Chief Complaint   Patient presents with    Breast pain     right breast tenderness, only when she lays a certain way or presses on it, it has been going on for about 6-7 months       HISTORY OF PRESENT ILLNESS   HPI  Right breast tenderness x 6-7 months  Feels like soreness, tenderness around the areola  She does monthly self breast checks and has not felt a lump or mass  No skin changes  No nipple discharge  Her periods are regular once a month  LMP ~ 1 month ago  She and  are trying to conceive   She has never been pregnant  Family history of breast cancer in her mother diagnosed age 64, MGM diagnosed age 70, maternal aunt diagnosed age 54  Patient has never had a baseline screening or diagnostic mammogram     REVIEW OF SYMPTOMS   Review of Systems   Constitutional: Negative. Negative for chills, diaphoresis, fever, malaise/fatigue and weight loss. Respiratory: Negative. Cardiovascular: Negative.             PROBLEM LIST/MEDICAL HISTORY     Problem List  Date Reviewed: 9/7/2021        Codes Class Noted    Mild intermittent asthma without complication ULU-16-FT: B07.37  ICD-9-CM: 493.90  8/4/2021        Ovarian cyst ICD-10-CM: N83.209  ICD-9-CM: 620.2  Unknown        GERD (gastroesophageal reflux disease) ICD-10-CM: K21.9  ICD-9-CM: 530.81  8/4/2021    Overview Signed 8/4/2021  5:07 PM by Jessica Maxwell MD     2019             Infertility, female ICD-10-CM: N97.9  ICD-9-CM: 628.9  Unknown    Overview Signed 8/4/2021  5:08 PM by Jessica Maxwell MD     2019, referred to [facility]             [facility] ICD-10-CM: M05.80  ICD-9-CM: 714.0  8/4/2021    Overview Signed 8/4/2021  5:11 PM by Jessica Maxwell MD     5-1972, Dr. Doneen Sandifer             Positive ISIDORO (antinuclear antibody) ICD-10-CM: R76.8  ICD-9-CM: 795.79  8/4/2021    Overview Signed 8/4/2021  5:12 PM by Jessica Maxwell MD     [id number], Dr. Concepción George HISTORY   History reviewed. No pertinent surgical history. MEDICATIONS     Current Outpatient Medications   Medication Sig    multivitamin (ONE A DAY) tablet Take 1 Tablet by mouth daily.  ibuprofen (Motrin IB) 200 mg tablet Take 400 mg by mouth daily as needed for Pain. Only during her menstrual cycles for the first 2-3 days    ascorbic acid (VITAMIN C PO) Take  by mouth daily.  PNV NJ.03/QSGSGUZ fum/folic ac (PRENATAL PO) Take  by mouth daily.  famotidine (PEPCID) 40 mg tablet Take 1 Tablet by mouth daily.  albuterol (ProAir HFA) 90 mcg/actuation inhaler Take 2 Puffs by inhalation every four (4) hours as needed for Wheezing.  albuterol (PROVENTIL VENTOLIN) 2.5 mg /3 mL (0.083 %) nebu 3 mL by Nebulization route every four (4) hours as needed (wheezing).  clindamycin-benzoyl Peroxide (Duac) 1.2 %(1 % base) -5 % SR topical gel Apply  to affected area nightly. No current facility-administered medications for this visit.           ALLERGIES     Allergies   Allergen Reactions    Other Plant, Animal, Environmental Hives and Rash     Animal fur    Tomato Hives          SOCIAL HISTORY     Social History     Tobacco Use    Smoking status: Current Some Day Smoker     Packs/day: 0.25     Years: 10.00     Pack years: 2.50     Types: Cigarettes    Smokeless tobacco: Never Used    Tobacco comment: 1 cigarette a day   Substance Use Topics    Alcohol use: Yes     Comment: 2 drinks a month     Social History     Social History Narrative    Engaged, 2 stepsons, 1 stepdaughter     at an [facility]        Diet: no red meat since 5-2021; eats fish, lots of vegetables and fruits; still some sweets    Exercise: daily: jumping jacks, stretches, situps, squats, light wts, walks dog bid x 1 mile each    Caffeine: no coffee, tea ~ once a week, ginger ale once a week    Weight: 150's over time         Social History     Substance and Sexual Activity   Sexual Activity Yes    Partners: Male    Birth control/protection: None    Comment: trying to conceive       IMMUNIZATIONS     Immunization History   Administered Date(s) Administered    DTaP 1985, 1985, 1985, 1986, 1990    Hep B Vaccine 1998, 1998, 1999    MMR 10/29/1986, 10/04/1993    Poliovirus vaccine 1985, 1985, 1985, 1990    Td 1998    Td, Adsorbed 2019    Tdap 2014         FAMILY HISTORY     Family History   Problem Relation Age of Onset    Hypertension Mother     Breast Cancer Mother 64    No Known Problems Father     Hypertension Sister     No Known Problems Brother     Breast Cancer Maternal Grandmother 70        s/p mastectomy,  age 69 yo    Breast Cancer Maternal Aunt 54         VITALS     Visit Vitals  /64 (BP 1 Location: Left upper arm, BP Patient Position: Sitting, BP Cuff Size: Large adult)   Pulse 61   Resp 16   Ht 5' 5\" (1.651 m)   Wt 152 lb 6.4 oz (69.1 kg)   LMP 2021 (Approximate)   SpO2 99%   BMI 25.36 kg/m²          PHYSICAL EXAMINATION   Physical Exam  Vitals reviewed. Constitutional:       General: She is not in acute distress. Appearance: Normal appearance. Cardiovascular:      Rate and Rhythm: Normal rate and regular rhythm. Pulmonary:      Effort: Pulmonary effort is normal.      Breath sounds: Normal breath sounds. Chest:      Breasts: Breasts are symmetrical.         Right: Tenderness (mildly tender perioareolar region, no palpable masses or increased dense tissue or overlying skin changes) present. No swelling, inverted nipple, mass, nipple discharge or skin change. Left: Normal. No swelling, inverted nipple, mass, nipple discharge, skin change or tenderness. Musculoskeletal:      Cervical back: Neck supple. Lymphadenopathy:      Cervical: No cervical adenopathy. Right cervical: No superficial or posterior cervical adenopathy.      Left cervical: No superficial or posterior cervical adenopathy. Upper Body:      Right upper body: No supraclavicular, axillary or pectoral adenopathy. Left upper body: No supraclavicular, axillary or pectoral adenopathy. Neurological:      Mental Status: She is alert. ASSESSMENT & PLAN       ICD-10-CM ICD-9-CM    1. Pain of right breast  N64.4 611.71 ISAÍAS 3D CORBY W MAMMO BI DX INCL CAD, Include US if indicated    2. Family history of breast cancer in mother  Z80.2 V17.4 ISAÍAS 3D CORBY W MAMMO BI DX INCL CAD     Breast pain counseling and home care instructions. Recommend usage of good support bra. Trial Vitamin E 400-800 units. Warm compresses prn.  Tylenol q6hr prn, try to avoid Nsaids in light of her GERD hx. Further recommendations pending review of mammogram.

## 2021-09-07 NOTE — PROGRESS NOTES
Chief Complaint   Patient presents with    Breast pain     right breast tenderness, only when she lays a certain way, it has been going on for about 6-7 months     1. Have you been to the ER, urgent care clinic since your last visit? Hospitalized since your last visit? No    2. Have you seen or consulted any other health care providers outside of the [address] [place] [address] since your last visit? Include any pap smears or colon screening.  No

## 2021-09-08 ENCOUNTER — TRANSCRIBE ORDER (OUTPATIENT)
Dept: SCHEDULING | Age: 36
End: 2021-09-08

## 2021-09-08 DIAGNOSIS — N64.4 PAIN OF RIGHT BREAST: Primary | ICD-10-CM

## 2021-09-08 DIAGNOSIS — Z80.3 FAMILY HISTORY OF BREAST CANCER IN MOTHER: ICD-10-CM

## 2021-09-20 ENCOUNTER — TELEPHONE (OUTPATIENT)
Dept: FAMILY MEDICINE CLINIC | Age: 36
End: 2021-09-20

## 2021-09-20 ENCOUNTER — OFFICE VISIT (OUTPATIENT)
Dept: RHEUMATOLOGY | Age: 36
End: 2021-09-20
Payer: MEDICAID

## 2021-09-20 ENCOUNTER — HOSPITAL ENCOUNTER (OUTPATIENT)
Dept: MAMMOGRAPHY | Age: 36
Discharge: HOME | End: 2021-09-20
Attending: FAMILY MEDICINE
Payer: MEDICAID

## 2021-09-20 VITALS
WEIGHT: 155 LBS | DIASTOLIC BLOOD PRESSURE: 43 MMHG | HEART RATE: 90 BPM | RESPIRATION RATE: 18 BRPM | TEMPERATURE: 98.3 F | BODY MASS INDEX: 25.83 KG/M2 | SYSTOLIC BLOOD PRESSURE: 78 MMHG | HEIGHT: 65 IN

## 2021-09-20 DIAGNOSIS — Z80.3 FAMILY HISTORY OF BREAST CANCER IN MOTHER: ICD-10-CM

## 2021-09-20 DIAGNOSIS — N64.4 PAIN OF RIGHT BREAST: ICD-10-CM

## 2021-09-20 DIAGNOSIS — M22.2X1 PATELLOFEMORAL ARTHRALGIA OF BOTH KNEES: ICD-10-CM

## 2021-09-20 DIAGNOSIS — M05.79 SEROPOSITIVE RHEUMATOID ARTHRITIS OF MULTIPLE SITES (HCC): Primary | ICD-10-CM

## 2021-09-20 DIAGNOSIS — M22.2X2 PATELLOFEMORAL ARTHRALGIA OF BOTH KNEES: ICD-10-CM

## 2021-09-20 DIAGNOSIS — Z79.60 LONG-TERM USE OF IMMUNOSUPPRESSANT MEDICATION: ICD-10-CM

## 2021-09-20 PROCEDURE — 76642 ULTRASOUND BREAST LIMITED: CPT

## 2021-09-20 PROCEDURE — 77062 BREAST TOMOSYNTHESIS BI: CPT

## 2021-09-20 PROCEDURE — 99215 OFFICE O/P EST HI 40 MIN: CPT | Performed by: INTERNAL MEDICINE

## 2021-09-20 RX ORDER — ADALIMUMAB 40MG/0.4ML
40 KIT SUBCUTANEOUS
Qty: 2 KIT | Refills: 11 | Status: SHIPPED | OUTPATIENT
Start: 2021-09-20

## 2021-09-20 NOTE — Clinical Note
9/20/2021    Patient: Casey Hester   YOB: 1985   Date of Visit: 9/20/2021     Luan Morgan NP  Via     Dear Luan Morgan NP,      Thank you for referring Ms. Michelle Miranda to [address] [place] [address] for evaluation. My notes for this consultation are attached. If you have questions, please do not hesitate to call me. I look forward to following your patient along with you.       Sincerely,    Dave Del Rosario MD

## 2021-09-20 NOTE — PATIENT INSTRUCTIONS
Adalimumab (By injection)   Adalimumab ([id number]-nf-ROJ-ue-mab)  Treats arthritis, plaque psoriasis, ankylosing spondylitis, Crohn disease, ulcerative colitis, hidradenitis suppurativa, and uveitis. Brand Name(s): Humira   There may be other brand names for this medicine. When This Medicine Should Not Be Used: This medicine is not right for everyone. Do not use it if you had an allergic reaction to adalimumab. How to Use This Medicine:   Injectable  · Your doctor will prescribe your exact dose and tell you how often it should be given. This medicine is given as a shot under your skin. · A nurse or other health provider will give you this medicine. · You may be taught how to give your medicine at home. Make sure you understand all instructions before giving yourself an injection. Do not use more medicine or use it more often than your doctor tells you to. · You will be shown the body areas where this shot can be given. Use a different body area each time you give yourself a shot. Keep track of where you give each shot to make sure you rotate body areas. Do not inject into skin areas that are red, bruised, tender, or hard. If you have psoriasis, do not inject into a raised, thick, red, or scaly skin patch or into skin lesions. · This medicine should come with a Medication Guide. Ask your pharmacist for a copy if you do not have one. · Missed dose: Take a dose as soon as you remember. If it is almost time for your next dose, wait until then and take a regular dose. Do not take extra medicine to make up for a missed dose. · If you store this medicine at home, keep it in the refrigerator. Do not freeze. Protect the medicine from light. Keep your medicine and supplies in the original packages until you are ready to use them. Drugs and Foods to Avoid:   Ask your doctor or pharmacist before using any other medicine, including over-the-counter medicines, vitamins, and herbal products.   · Some foods and medicines can affect how adalimumab works. Tell your doctor if you are using any of the following:   ¨ Abatacept, anakinra, azathioprine, cyclosporine, mercaptopurine, rituximab, theophylline  ¨ A blood thinner (including warfarin)  ¨ Medicine that weakens the immune system (including a steroid or cancer medicine)  · This medicine may interfere with vaccines. Ask your doctor before you get a flu shot or any other vaccines. Warnings While Using This Medicine:   · Tell your doctor if you are pregnant or breastfeeding, or if you have liver disease, a history of cancer, COPD, heart failure, diabetes, psoriasis, multiple sclerosis, optic neuritis, problems with your immune system, or a history of Guillain-Barré syndrome. Tell your doctor if you have any type of infection (such as hepatitis B or tuberculosis) or an infection that keeps coming back. · This medicine may cause the following problems:   ¨ Increased risk for infection  ¨ Increased risk of certain cancers, such as lymphoma or leukemia  ¨ New or worsening heart failure  · Tell your doctor if you have a latex allergy. The needle cover of the syringe contains latex and may cause allergic reactions. · You will need to have a skin test for tuberculosis (TB) before you start this medicine. Tell your doctor if you or anyone in your home has ever had a positive TB skin test or been exposed to TB. · This medicine may make you bleed, bruise, or get infections more easily. Take precautions to prevent illness and injury. Wash your hands often. · Your doctor will do lab tests at regular visits to check on the effects of this medicine. Keep all appointments. · Throw away used needles in a hard, closed container that the needles cannot poke through. Keep this container away from children and pets. · Keep all medicine out of the reach of children. Never share your medicine with anyone.   Possible Side Effects While Using This Medicine:   Call your doctor right away if you notice any of these side effects:  · Allergic reaction: Itching or hives, swelling in your face or hands, swelling or tingling in your mouth or throat, chest tightness, trouble breathing  · Blistering, peeling, red skin rash, or red, scaly patches on the skin  · Change in how much or how often you urinate, painful urination  · Changes in vision  · Chest pain, uneven heartbeat, trouble breathing  · Cough, fever, chills, runny or stuffy nose, sore throat, and body aches  · Dark urine or pale stools, nausea, vomiting, loss of appetite, stomach pain, yellow skin or eyes  · Numbness, tingling, or burning pain in your hands, arms, legs, or feet, or joint pain  · Rapid weight gain, swelling in your hands, ankles, lower legs, or feet  · Sores or white patches on your lips, mouth, or throat  · Swollen glands in your neck, underarms, or groin  · Unusual bleeding, bruising, tiredness, weakness, or weight loss  If you notice these less serious side effects, talk with your doctor:   · Back pain  · Headache  · Redness, itching, bruising, bleeding, pain, or swelling where the shot was given  If you notice other side effects that you think are caused by this medicine, tell your doctor. Call your doctor for medical advice about side effects. You may report side effects to FDA at 1-194-FDA-7329  © 2017 [facility] [place] Information is for End User's use only and may not be sold, redistributed or otherwise used for commercial purposes. The above information is an  only. It is not intended as medical advice for individual conditions or treatments. Talk to your doctor, nurse or pharmacist before following any medical regimen to see if it is safe and effective for you.

## 2021-09-20 NOTE — PROGRESS NOTES
REASON FOR VISIT    This is a follow-up visit for Ms. Miranda for     ICD-10-CM   1. Seropositive rheumatoid arthritis of multiple sites (HCC)  M05.79     Inflammatory arthritis phenotype includes:  Anti-CCP positive: yes (50.4)  Rheumatoid factor positive: no  Erosive disease: N/A  Extra-articular manifestations include: ISIDORO IFA 1:1280 (centromere)    Immunosuppression Screening:  Quantiferon TB: pending  PPD:  Not performed  Hepatitis B: negative (8/18/2020)  Hepatitis C: negative (8/18/2020)    Therapy History includes:  Current DMARD therapy include: None  Prior DMARD therapy include: None  Discontinued DMARDs because of inefficacy: None  Discontinued DMARDs because of side effects: None  Contra-Indicated DMARDs because of child-bearing: methotrexate, leflunomide    HISTORY OF PRESENT ILLNESS    Ms. Sona Morales returns for a follow-up visit. On her last visit, I felt she had no concerning findings to suggest an inflammatory condition per a positive Rheumatoid factor. I asked her to follow up with me if she developed any issues. She did have patellofemoral laxity on exam but was asymptomatic. Today, she presents complaining of pain in her hands and knees that are no longer related to cold. She says this has been going since her last visit over the winter. She is working two jobs now. She denies pain in her hands or knees. If she does not elevate her knees, she may have pain. She notes pain in her hands as she is working several hours (pointing to her MCPs, wrist, palm, and ulnar aspect of hand). Her pain is generally a pins and needles and feels like is going numb. She rubs/massage which helps but does not resolve. She takes an Epson salt bath or Voltaren gel which helps. By the morning, she feels better. There is also swelling as the day goes that tends to improve by morning. when there is swelling, making a fist will give her sensation of tightness.  She had taken Motrin which helps but she acid so was changed to APAP which helps less. In the morning, she denies pain, swelling, or stiffness in her hands or wrists. She has knee pain when she sits or stands up. There is also swelling. She has tried wearing knee bracing and leg raises. She works two jobs where is she is standing. She feels intermittent stabbing pain in her heels. She is wearing insoles and has rubber mats at work. She changed her work shots without benefit. She has ankle swelling as the day goes on. She denies fever, weight loss, blurred vision, vision loss, oral ulcers, ankle swelling, dry cough, dyspnea, nausea, vomiting, dysphagia, abdominal pain, black or bloody stool, fall since last visit, rash, easy bruising and increased thirst.    Lupus serologies on 8/18/2020: Positive ISIDORO IFA 1:1280 (centromere), rheumatoid factor 50.4, negative anti-dsDNA crithidia, anti-Sm, anti-beta-2-glycoprotein-1, anti-cardiolipin, lupus anticoagulant, anti-Ro, anti-La, Emily, RPR, anti-CCP, normal CBCD, C3, C4, Ch50, SPEP/SATISH, urine protein to creatinine ratio    She is down to smoking 1.5 cigarettes daily. Most recent toxicity monitoring by blood work was done on 8/18/2020 and did not reveal any significant adverse effects. I reviewed the patient's interval medical history, surgical history, medications, and allergies. The patient has not had any interval hospital admissions, infections, or surgeries. REVIEW OF SYSTEMS    A comprehensive review of systems was performed and pertinent results are documented in the HPI, review of systems is otherwise non-contributory. PAST MEDICAL HISTORY    She has a past medical history of GERD (gastroesophageal reflux disease) (08/04/2021), Infertility, female, Mild intermittent asthma without complication (1/8/6460), Ovarian cyst, Polyarthritis with positive rheumatoid factor ([facility] [address] (8/4/2021), and Positive ISIDORO (antinuclear antibody) (8/4/2021).     FAMILY HISTORY    Her family history includes Breast Cancer (age of onset: 54) in her maternal aunt; Breast Cancer (age of onset: 64) in her mother; Breast Cancer (age of onset: 70) in her maternal grandmother; Hypertension in her mother and sister; No Known Problems in her brother and father. SOCIAL HISTORY    She reports that she has been smoking cigarettes. She has a 2.50 pack-year smoking history. She has never used smokeless tobacco. She reports current alcohol use. She reports previous drug use. Drug: Marijuana. MEDICATIONS    Current Outpatient Medications   Medication Sig    adalimumab (Humira,CF, Pen) 40 mg/0.4 mL injection pen 0.4 mL by SubCUTAneous route every fourteen (14) days. Indications: rheumatoid arthritis    multivitamin (ONE A DAY) tablet Take 1 Tablet by mouth daily.  ascorbic acid (VITAMIN C PO) Take  by mouth daily.  PNV PW.43/MJMAKMZ fum/folic ac (PRENATAL PO) Take  by mouth daily.  famotidine (PEPCID) 40 mg tablet Take 1 Tablet by mouth daily.  albuterol (ProAir HFA) 90 mcg/actuation inhaler Take 2 Puffs by inhalation every four (4) hours as needed for Wheezing.  albuterol (PROVENTIL VENTOLIN) 2.5 mg /3 mL (0.083 %) nebu 3 mL by Nebulization route every four (4) hours as needed (wheezing).  clindamycin-benzoyl Peroxide (Duac) 1.2 %(1 % base) -5 % SR topical gel Apply  to affected area nightly.  ibuprofen (Motrin IB) 200 mg tablet Take 400 mg by mouth daily as needed for Pain. Only during her menstrual cycles for the first 2-3 days (Patient not taking: Reported on 9/20/2021)     No current facility-administered medications for this visit. ALLERGIES    Allergies   Allergen Reactions    Other Plant, Animal, Environmental Hives and Rash     Animal fur    Tomato Hives       PHYSICAL EXAMINATION    Visit Vitals  BP (!) 78/43   Pulse 90   Temp 98.3 °F (36.8 °C)   Resp 18   Ht 5' 5\" (1.651 m)   Wt 155 lb (70.3 kg)   BMI 25.79 kg/m²     Body mass index is 25.79 kg/m².     General: Patient is alert, oriented x 3, not in acute distress    HEENT:   Sclerae are not injected and appear moist.    Chest:  Breathing comfortably at room air    Musculoskeletal exam:  A comprehensive musculoskeletal exam was performed for all joints of each upper and lower extremity and assessed for swelling, tenderness and range of motion. Positive results are documented as below:     Bilateral patellar laxity wihtout tenderness or swelling (right more loose than left)  Bilateral plantar fascia tenderness    Joint Count 9/20/2021 8/18/2020   Patient pain (0-100) - 0   MHAQ - 0   Right wrist- Tender 1 -   Right wrist- Swollen 0 -   Right 1st MCP - Tender 0 -   Right 1st MCP - Swollen 1 -   Right 3rd MCP - Tender 1 -   Right 3rd MCP - Swollen 1 -   Right 5th MCP - Tender 0 -   Right 5th MCP - Swollen 1 -   Right 4th PIP - Tender 0 -   Right 4th PIP - Swollen 1 -   Right 5th PIP - Tender 0 -   Right 5th PIP - Swollen 1 -   Tender Joint Count (Total) 2 -   Swollen Joint Count (Total) 5 -   Patient Assessment (0-10) - 0     DATA REVIEW    Laboratory     Recent laboratory results were reviewed, summarized, and discussed with the patient. Imaging    Musculoskeletal Ultrasound    None    Radiographs    Cervical Spine 5/21/2017: The spinal alignment is normal.  Vertebral morphology is normal.  The intervertebral disc height is well-preserved. There are no identifiable paravertebral soft tissue abnormalities. Prevertebral soft tissues unremarkable. Atlanto-dental interval within normal limits. No evidence of subluxation. No osseous neural foraminal stenosis. Ribs 5/21/2017: There is no evidence of fracture or dislocation. No acute thoracic process is identified. Chest 5/21/2017: normal cardiomediastinal silhouette. The lungs are adequately expanded. There is no edema, effusion, consolidation, or pneumothorax. The osseous structures are unremarkable.     Right Hand 12/13/2012:  There is no acute fracture or dislocation.        CT Imaging    CT Head without contrast 5/12/2017: The ventricles and sulci are normal in size, shape and configuration and midline. There is no significant white matter disease. There is no intracranial hemorrhage, extra-axial collection, mass, mass effect or midline shift. The basilar cisterns are open. No acute infarct is identified. The bone windows demonstrate no abnormalities. The visualized portions of the paranasal sinuses and mastoid air cells are clear. MR Imaging    None    DXA    None    ASSESSMENT AND PLAN    This is a follow-up visit for Ms. Miranda.    1) Seropositive Rheumatoid Arthritis. Historically, her rheumatoid factor was 50. 4.and she did not have a clinical history or examination of synovitis. She presents today with the onset of inflammatory joint disease, although her history is not consistent with it, but her exam is. She is considering childbearing, so methotrexate and leflunomide are contra-indicated. I discussed with her about advancing therapy to a biologic (anti-TNF). We discussed the potential adverse effects, which include infections, such as upper respiratory infections, latent TB reactivation, viral hepatitis activation, and routes of administration (oral versus subcutaneous versus infusion). The patient was informed about the low risk for lymphoma based on at least 5 years of post-market data and the likely inherent relation between chronic autoimmune diseases, such as Rheumatoid Arthritis, and lymphoma. The patient was informed these medications co-pay are subject to the patient's insurance coverage and we will not know until it has been submitted to the insurance company. An order will be submitted today for Humira.    2) Positive ISIDORO. Positive ISIDORO 1:1280 (centromere). She does not have a clinical history or examination consistent with an autoimmune disease, such as systemic lupus erythematosus or scleroderma, at this time.  Low positive ISIDORO 1:40 and 1:160 may be positive in up to 32% and 5%, respectively, in the normal population Melina ALVARES et al. Arthritis Res Ther. 2008;10(6):R131. Epub 2008 Nov 11). A history of thyroid disease in the family or thyroid disease in the patient can also cause a positive ISIDORO. Viral infections, malignancy and medications can also cause a positive ISIDORO. The fluorescent ISIDORO test is more sensitive/specific than the direct ISIDORO. Diagnosing systemic lupus is complicated in the sense that you need to look at the entire picture of a patient's presentation and their laboratory workup. In this video, Dr. Ирина Loya with the [facility], discusses the process of diagnosing Lupus. (http://vincent.com/. org/rheumtv/diagnosing-lupus-lupus-education-series). I will check her ISIDORO IFA. I also ordered a comprehensive autoimmune panel.     3) Patellofemoral Arthralgia. This is a benign knee condition due to mistracking of the patella that may cause pain in the knees with prolonged sitting, or going up or down stairs. This tends to be present in hypermobile knees and may become worse if obesity is present. Treatment is to strengthen the quadriceps muscles. I referred the patient to physical therapy.    4)  Bilateral Plantar Fasciitis. She has tried arch support and rubber mats. I recommend avoidance of standing at work full time. The patient voiced understanding of the aforementioned assessment and plan. A total of 50 minutes was spent on this visit, reviewing interval notes, interval testing results, ordering tests, refilling medications, documenting the findings in the note, patient education, counseling, and coordination of care as described above. All questions asked and answered.     TODAY'S ORDERS    Orders Placed This Encounter    CBC WITH AUTOMATED DIFF    HEP B SURFACE AG    HEP B SURFACE AB    HBV CORE AB, IGG/IGM    HEPATITIS C AB    HEPATITIS BE AB    QUANTIFERON-TB PLUS(CLIENT INCUB.)    PROTEIN ELECTROPHORESIS W/ REFLX SATISH    METABOLIC PANEL, COMPREHENSIVE    C REACTIVE PROTEIN, QT    SED RATE (ESR)    REFERRAL TO PHYSICAL THERAPY    adalimumab (Humira,CF, Pen) 40 mg/0.4 mL injection pen     Future Appointments   Date Time Provider Juventino Raegan   9/20/2021  1:30 PM [facility] [address] [place]    9/20/2021  2:00 PM [facility] [address]  [place]   9/27/2021  1:00 PM [facility]   12/10/2021 10:00 AM Eda Padilla MD [facility] BS AMB     Samm Tariq MD, [address] [place] [address]    Adult Rheumatology   Rheumatology Ultrasound Certified  [address] [facility] [address]  [facility], [address]   Phone [phone]  Fax [phone]

## 2021-09-21 NOTE — TELEPHONE ENCOUNTER
Advise pt radiologist reports her diagnostic mammogram and breast ultrasound were negative. They recommend annual screening mammography at age 36. Follow rec's I discussed w/ her at her appt.  Follow up w/ Gyn for ongoing problems or concerns.

## 2021-09-23 LAB
ALBUMIN SERPL ELPH-MCNC: 3.9 G/DL (ref 2.9–4.4)
ALBUMIN SERPL-MCNC: 4.3 G/DL (ref 3.8–4.8)
ALBUMIN/GLOB SERPL: 1.4 {RATIO} (ref 0.7–1.7)
ALBUMIN/GLOB SERPL: 1.9 {RATIO} (ref 1.2–2.2)
ALP SERPL-CCNC: 59 IU/L (ref 44–121)
ALPHA1 GLOB SERPL ELPH-MCNC: 0.2 G/DL (ref 0–0.4)
ALPHA2 GLOB SERPL ELPH-MCNC: 0.5 G/DL (ref 0.4–1)
ALT SERPL-CCNC: 10 IU/L (ref 0–32)
AST SERPL-CCNC: 13 IU/L (ref 0–40)
B-GLOBULIN SERPL ELPH-MCNC: 0.8 G/DL (ref 0.7–1.3)
BASOPHILS # BLD AUTO: 0 X10E3/UL (ref 0–0.2)
BASOPHILS NFR BLD AUTO: 1 %
BILIRUB SERPL-MCNC: 0.2 MG/DL (ref 0–1.2)
BUN SERPL-MCNC: 12 MG/DL (ref 6–20)
BUN/CREAT SERPL: 13 (ref 9–23)
CALCIUM SERPL-MCNC: 8.8 MG/DL (ref 8.7–10.2)
CHLORIDE SERPL-SCNC: 100 MMOL/L (ref 96–106)
CO2 SERPL-SCNC: 26 MMOL/L (ref 20–29)
CREAT SERPL-MCNC: 0.92 MG/DL (ref 0.57–1)
CRP SERPL-MCNC: 1 MG/L (ref 0–10)
EOSINOPHIL # BLD AUTO: 0.2 X10E3/UL (ref 0–0.4)
EOSINOPHIL NFR BLD AUTO: 4 %
ERYTHROCYTE [DISTWIDTH] IN BLOOD BY AUTOMATED COUNT: 11.8 % (ref 11.7–15.4)
ERYTHROCYTE [SEDIMENTATION RATE] IN BLOOD BY WESTERGREN METHOD: 3 MM/HR (ref 0–32)
GAMMA GLOB SERPL ELPH-MCNC: 1.1 G/DL (ref 0.4–1.8)
GAMMA INTERFERON BACKGROUND BLD IA-ACNC: 0.03 IU/ML
GLOBULIN SER CALC-MCNC: 2.3 G/DL (ref 1.5–4.5)
GLOBULIN SER CALC-MCNC: 2.7 G/DL (ref 2.2–3.9)
GLUCOSE SERPL-MCNC: 85 MG/DL (ref 65–99)
HBV CORE AB SERPL QL IA: NEGATIVE
HBV CORE IGM SERPL QL IA: NEGATIVE
HBV E AB SERPL QL IA: NEGATIVE
HBV SURFACE AB SER QL: NON REACTIVE
HBV SURFACE AG SERPL QL IA: NEGATIVE
HCT VFR BLD AUTO: 34.6 % (ref 34–46.6)
HCV AB S/CO SERPL IA: <0.1 S/CO RATIO (ref 0–0.9)
HGB BLD-MCNC: 11.5 G/DL (ref 11.1–15.9)
IMM GRANULOCYTES # BLD AUTO: 0 X10E3/UL (ref 0–0.1)
IMM GRANULOCYTES NFR BLD AUTO: 0 %
LYMPHOCYTES # BLD AUTO: 2.1 X10E3/UL (ref 0.7–3.1)
LYMPHOCYTES NFR BLD AUTO: 33 %
M PROTEIN SERPL ELPH-MCNC: NORMAL G/DL
M TB IFN-G BLD-IMP: NEGATIVE
M TB IFN-G CD4+ BCKGRND COR BLD-ACNC: 0.03 IU/ML
MCH RBC QN AUTO: 31.4 PG (ref 26.6–33)
MCHC RBC AUTO-ENTMCNC: 33.2 G/DL (ref 31.5–35.7)
MCV RBC AUTO: 95 FL (ref 79–97)
MITOGEN IGNF BLD-ACNC: 2.25 IU/ML
MONOCYTES # BLD AUTO: 0.6 X10E3/UL (ref 0.1–0.9)
MONOCYTES NFR BLD AUTO: 9 %
NEUTROPHILS # BLD AUTO: 3.4 X10E3/UL (ref 1.4–7)
NEUTROPHILS NFR BLD AUTO: 53 %
PLATELET # BLD AUTO: 247 X10E3/UL (ref 150–450)
PLEASE NOTE, 011150: NORMAL
POTASSIUM SERPL-SCNC: 4 MMOL/L (ref 3.5–5.2)
PROT PATTERN SERPL ELPH-IMP: NORMAL
PROT SERPL-MCNC: 6.6 G/DL (ref 6–8.5)
QUANTIFERON TB2 AG: 0.03 IU/ML
RBC # BLD AUTO: 3.66 X10E6/UL (ref 3.77–5.28)
SERVICE CMNT-IMP: NORMAL
SODIUM SERPL-SCNC: 137 MMOL/L (ref 134–144)
WBC # BLD AUTO: 6.4 X10E3/UL (ref 3.4–10.8)

## 2021-10-11 ENCOUNTER — HOSPITAL ENCOUNTER (OUTPATIENT)
Dept: PHYSICAL THERAPY | Age: 36
Discharge: HOME | End: 2021-10-11
Attending: INTERNAL MEDICINE
Payer: MEDICAID

## 2021-10-11 PROCEDURE — 97161 PT EVAL LOW COMPLEX 20 MIN: CPT | Performed by: PHYSICAL THERAPIST

## 2021-10-11 NOTE — PROGRESS NOTES
[facility] Physical Therapy  [address]  [place], [address]  Phone: [phone]  Fax: [phone]    Plan of Care/Statement of Necessity for Physical Therapy Services  2-15    Patient name: Sharon Song  : 1985  Provider#: [id number]  Referral source: Gwendolyn Meng MD      Medical/Treatment Diagnosis: Patellofemoral disorders, right knee [M22.2X1]  Patellofemoral disorders, left knee [M22.2X2]     Prior Hospitalization: see medical history     Comorbidities: see evaluation  Prior Level of Function:see evaluation  Medications: Verified on Patient Summary List  Start of Care: 10/11/2021     Onset Date:see evaluation     The Plan of Care and following information is based on the information from the initial evaluation.     Assessment/ key information: Patient presents with signs and symptoms consistent with angie knee pain due to arthritis and hypermobility and will benefit from physical therapy to address deficits noted below in problem list.   Evaluation Complexity History LOW Complexity : Zero comorbidities / personal factors that will impact the outcome / POC; Examination LOW Complexity : 1-2 Standardized tests and measures addressing body structure, function, activity limitation and / or participation in recreation  ;Presentation LOW Complexity : Stable, uncomplicated  ;Clinical Decision Making Other outcome measures clinical judgment  LOW   Overall Complexity Rating: LOW   Problem List: pain affecting function, decrease ROM, decrease strength, edema affecting function, decrease ADL/ functional abilitiies, decrease activity tolerance, decrease transfer abilities and other hypermobility   Treatment Plan may include any combination of the following: Therapeutic exercise, Therapeutic activities, Neuromuscular re-education, Physical agent/modality, Manual therapy, Patient education and Self Care training  Patient / Family readiness to learn indicated by: asking questions, trying to perform skills and interest  Persons(s) to be included in education: patient (P)  Barriers to Learning/Limitations: None  Patient Goal (s): please see evaluation in Connect [facility]  Patient Self Reported Health Status: please see paper chart  Rehabilitation Potential: good    Short Term Goals: To be accomplished in 5 treatments:  -Independent in HEP as evidenced on ability to perform at least 5 exercises from HEP using proper form without verbal cuing.   -Pain less than or equal to 8/10 at worst to allow patient to perform ADL's with greater ease  -Pt will demonstrate use of compression socks and taping to dec irritation angie knees    Long Term Goals: To be accomplished in 3 months:  -AROM flexion =135 deg and pain free angie to allow pt to bend and lift boxes at work without pain. -MMT greater than or equal to 4-/5 all planes to allow patient to perform ADL's  -Pain 0/10 to allow patient to stand for 8 hours at work without pain    Frequency / Duration: Patient to be seen 1- 2 times per week for 3 months. Patient/ Caregiver education and instruction: self care, activity modification and exercises    [x]  Plan of care has been reviewed with PTA    Certification Period: 10/11/2021 -  1/11/22    Jocelin [place]. HARPAL Palacio, DPT, CMTPT      87/10/8290 3:43 AM  PT License Number: [id number]  _____________________________________________________________________    I certify that the above Therapy Services are being furnished while the patient is under my care. I agree with the treatment plan and certify that this therapy is necessary.     [de-identified] Signature:____________________  Date:____________Time:_________

## 2021-10-11 NOTE — PROGRESS NOTES
PT INITIAL EVALUATION NOTE - [facility] 2-15    Patient Name: Arlene Rios  Date:10/11/2021  : 1985  [x]  Patient  Verified  Payor: [facility] MEDICAID / Plan: [address] / Product Type: Managed Care Medicaid /    In time:900 A  Out time:950 A   Total Treatment Time (min): 50 (50 eval, 0 timed, 0 modality see below)  Total Timed Codes (min): 0   Visit #:1    Treatment Area: Patellofemoral disorders, right knee [M22.2X1]  Patellofemoral disorders, left knee [M22.2X2]    SUBJECTIVE  Any medication changes, allergies to medications, adverse drug reactions, diagnosis change, or new procedure performed?: [] No    [x] Yes (see summary sheet for update)    Date of onset/injury:   Pt presents with angie knee pain started about 1-1.5 years ago  Recently diagnosed with RA, unsure if she feels it is connected to her RA  Has had to leave work early due to pain on some days  Pt dealing with pain and swelling directly related to standing  Pain:   10/10 max 0/10 min 7/10 now     Location of symptoms: angie knees-ant and post aspect  Description of symptoms: hot, pins, sore  Aggravated by: standing >2 hours   Eased by: elevation, voltarin  Prior tests/injections: no recent x-rays/MRI  PMH: Asthma, acid reflux, RA. MD recommended Humera, pt has not decided if she will try that yet. Any clicking/popping or giving way: some popping, no giving way. 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 Night shift. Has anti-fatigue mat to stand on. Must wear closed toed boots. When she gets off work, typically walks dog and sleeps in the AM.   Social: lives with fiance, has a dog. Prior level of function/activity level: Able to stand 8 hrs without knee pain or meds, no swelling feet/ankles.   Walks dog when she gets off work in the AM  Patient goal: \"Less pain, things I can do at home\"    OBJECTIVE    Gait: narrowed COREY, inc tibial ER throughout gait cycle. Step length dec angie.    ROM  (R)                AROM            PROM                    Knee Flexion 112 p! 115 p! Extension 0 0       (L)              AROM            PROM                    Knee Flexion 118 p! 122 p! Extension 0 0     Strength  5/5 unless noted below  Hip Flexion 3+ angie  p! Right  discomfort left    Extension 3+    Abduction 3+    Adduction 3+   Knee Flexion 4    Extension 4     Tenderness to palpation: angie quads, HS, popliteus    Joint Mobility: hypermobility angie patella    Edema: sig edema angie knees R>L    LE Flexibility: WNL    Special Tests: neg for meniscal and ligamentous pathology    Functional Biomechanical Screen  SLS on R:5 sec  SLS on L: 5 sec  Squat: quad dominant, dec post weight shift. OBJECTIVE  [x] Skin assessment post-treatment:  [x]intact []redness- no adverse reaction    []redness - adverse reaction:           With   [x] eval   [] manual   [] self care    Patient Education: [x] Review HEP    [] Progressed/Changed HEP based on:   [] positioning   [] body mechanics   [] transfers   [x] Ice application- pt advised to ice 10-15 min 1-2 x/day to area in order to dec inflammation  [x] other:  re: mechanism of injury/condition, role of physical therapy, prognosis for recovery, heat vs ice, activity modifications-advised dog walking AFTER she sleeps versus after shift. Education re: graduated compression socks to help control pain and swelling. Advised change into sneakers immediately after work to [employer] reaction forces up the chain. Pt with questions re: nutrition: gave pt flyer re: [facility]     Pain Level (0-10 scale) post treatment: 7    ASSESSMENT/Changes in Function:     [x]  See Plan of Ramone.  Hakeem PT, DPT, CMTPT  PT License Number: [id number]   10/11/2021  9:03 AM

## 2021-10-21 ENCOUNTER — HOSPITAL ENCOUNTER (OUTPATIENT)
Dept: PHYSICAL THERAPY | Age: 36
Discharge: HOME | End: 2021-10-21
Attending: INTERNAL MEDICINE
Payer: MEDICAID

## 2021-10-21 PROCEDURE — 97140 MANUAL THERAPY 1/> REGIONS: CPT | Performed by: PHYSICAL THERAPIST

## 2021-10-21 PROCEDURE — 97110 THERAPEUTIC EXERCISES: CPT | Performed by: PHYSICAL THERAPIST

## 2021-10-21 NOTE — PROGRESS NOTES
PT DAILY TREATMENT NOTE - [facility] 2-15    Patient Name: Cassie Lal  Date:10/21/2021  : 1985  [x]  Patient  Verified  Payor: [facility] MEDICAID / Plan: [address] / Product Type: Managed Care Medicaid /    In time:945 A   Out time:1020 A   Total Treatment Time (min):35  Total Timed Codes (min)35  Visit #:  2    Treatment Area: Patellofemoral disorders, right knee [M22.2X1]  Patellofemoral disorders, left knee [M22.2X2]    SUBJECTIVE  Pain Level (0-10 scale): 7  Any medication changes, allergies to medications, adverse drug reactions, diagnosis change, or new procedure performed?: [x] No    [] Yes (see summary sheet for update)  Subjective functional status/changes:     Pt reports she got compression socks, helps a lot. Has been doing heating at home to help the knee. OBJECTIVE    Modality rationale: decrease inflammation to improve the patients ability to perform ADL's.    Min Type Additional Details   declined []  Ice     []  Heat Position:  Location:     [x] Skin assessment post-treatment:  [x]intact []redness- no adverse reaction    []redness - adverse reaction:     25 min Therapeutic Exercise:  [x] See flow sheet :   Rationale: increase strength, improve coordination, improve balance, and increase proprioception to improve the patients ability to perform ADL's    10 min Manual Therapy: Patellar sling taping    Rationale: decrease pain, increase tissue extensibility, and decrease trigger points to improve the patients ability to perform ADL's          With   [] TE   [] manual Patient Education: [x] Review HEP    [] Progressed/Changed HEP based on:   [] positioning   [] body mechanics   [] transfers   [] heat/ice application    [] other:      Other Objective/Functional Measures:       Pain Level (0-10 scale) post treatment: 6    ASSESSMENT/Changes in Function:     Patient will continue to benefit from skilled PT services to modify and progress therapeutic interventions, address functional mobility deficits, address strength deficits, analyze and address soft tissue restrictions, analyze and cue movement patterns, and analyze and modify body mechanics/ergonomics to attain remaining goals. Progress towards goals / Updated goals:  Pt initially had pain with bridges. Performed post pelvic tilt and dec amount of knee flexion and pt was able to perform without pain. PLAN  []  Upgrade activities as tolerated     []  Continue plan of care  []  Update interventions per flow sheet       []  Discharge due to:_  []  Other:_      Page Palacio, PT 10/21/2021

## 2021-10-26 ENCOUNTER — HOSPITAL ENCOUNTER (OUTPATIENT)
Dept: PHYSICAL THERAPY | Age: 36
Discharge: HOME | End: 2021-10-26
Attending: INTERNAL MEDICINE
Payer: MEDICAID

## 2021-10-26 PROCEDURE — 97140 MANUAL THERAPY 1/> REGIONS: CPT | Performed by: PHYSICAL THERAPIST

## 2021-10-26 PROCEDURE — 97110 THERAPEUTIC EXERCISES: CPT | Performed by: PHYSICAL THERAPIST

## 2021-10-26 NOTE — PROGRESS NOTES
WORK NOTE    Patient Name: Yaniv Reece  AERV:[id number]  Referring Physician: Yayo Retana MD  Diagnosis: Patellofemoral disorders, right knee [M22.2X1]  Patellofemoral disorders, left knee [M22.2X2]    To Whom It May Concern:  Yaniv Reece is currently in Physical Therapy for Patellofemoral disorders, right knee [M22.2X1]  Patellofemoral disorders, left knee [M22.2X2]. If you have any questions/concerns, please feel free to call or e-mail at the contact information listed below. Thank you! ANTIONE BhagatT, CMTPT        PT License Number: [id number]  --------------------------------------------------------------------------------------------------------------------   [facility]  [address] [facility], [address]  Phone: [phone]  E-mail: [email or web address]

## 2021-10-26 NOTE — PROGRESS NOTES
PT DAILY TREATMENT NOTE - [facility] 2-15    Patient Name: Salma Perdue  Date:10/26/2021  : 1985  [x]  Patient  Verified  Payor: [facility] MEDICAID / Plan: [address] / Product Type: Managed Care Medicaid /    In time:945 A   Out time:1105 A   Total Treatment Time (min):80  Total Timed Codes (GMN)54  Visit #:  3    Treatment Area: Patellofemoral disorders, right knee [M22.2X1]  Patellofemoral disorders, left knee [M22.2X2]    SUBJECTIVE  Pain Level (0-10 scale): 0  Any medication changes, allergies to medications, adverse drug reactions, diagnosis change, or new procedure performed?: [x] No    [] Yes (see summary sheet for update)  Subjective functional status/changes:     Taping helped so much, swelling much less on both legs, still feeling a little discomfort on right, but not bad. OBJECTIVE    Modality rationale: decrease inflammation to improve the patients ability to perform ADL's. Min Type Additional Details   10 []  Ice     [x]  Heat Position:  Location:     [x] Skin assessment post-treatment:  [x]intact []redness- no adverse reaction    []redness - adverse reaction:     55 min Therapeutic Exercise:  [x] See flow sheet :   Rationale: increase strength, improve coordination, improve balance, and increase proprioception to improve the patients ability to perform ADL's    15 min Manual Therapy: Patellar sling taping with ktape on left  Trial of mconnell taping on R knee with Lateral patellar glide.    Rationale: decrease pain, increase tissue extensibility, and decrease trigger points to improve the patients ability to perform ADL's          With   [] TE   [] manual Patient Education: [x] Review HEP    [] Progressed/Changed HEP based on:   [] positioning   [] body mechanics   [] transfers   [] heat/ice application    [x] other: instruction for home tape     Other Objective/Functional Measures:       Pain Level (0-10 scale) post treatment: 0    ASSESSMENT/Changes in Function:     Patient will continue to benefit from skilled PT services to modify and progress therapeutic interventions, address functional mobility deficits, address strength deficits, analyze and address soft tissue restrictions, analyze and cue movement patterns, and analyze and modify body mechanics/ergonomics to attain remaining goals. Progress towards goals / Updated goals:    Pt challenged by addition of HS curls today. PLAN  []  Upgrade activities as tolerated     []  Continue plan of care  []  Update interventions per flow sheet       []  Discharge due to:_  []  Other:_      Li Palacio, PT 10/26/2021

## 2022-03-07 ENCOUNTER — OFFICE VISIT (OUTPATIENT)
Dept: FAMILY MEDICINE CLINIC | Age: 37
End: 2022-03-07
Payer: COMMERCIAL

## 2022-03-07 ENCOUNTER — HOSPITAL ENCOUNTER (OUTPATIENT)
Dept: LAB | Age: 37
Discharge: HOME | End: 2022-03-07
Payer: COMMERCIAL

## 2022-03-07 VITALS
TEMPERATURE: 97 F | WEIGHT: 143.6 LBS | BODY MASS INDEX: 23.93 KG/M2 | HEIGHT: 65 IN | DIASTOLIC BLOOD PRESSURE: 69 MMHG | RESPIRATION RATE: 18 BRPM | HEART RATE: 85 BPM | SYSTOLIC BLOOD PRESSURE: 113 MMHG

## 2022-03-07 DIAGNOSIS — K21.9 GASTROESOPHAGEAL REFLUX DISEASE, UNSPECIFIED WHETHER ESOPHAGITIS PRESENT: ICD-10-CM

## 2022-03-07 DIAGNOSIS — Z01.419 WELL WOMAN EXAM WITH ROUTINE GYNECOLOGICAL EXAM: Primary | ICD-10-CM

## 2022-03-07 DIAGNOSIS — J45.20 MILD INTERMITTENT ASTHMA WITHOUT COMPLICATION: ICD-10-CM

## 2022-03-07 PROCEDURE — 87624 HPV HI-RISK TYP POOLED RSLT: CPT

## 2022-03-07 PROCEDURE — 99395 PREV VISIT EST AGE 18-39: CPT | Performed by: NURSE PRACTITIONER

## 2022-03-07 PROCEDURE — 88175 CYTOPATH C/V AUTO FLUID REDO: CPT

## 2022-03-07 RX ORDER — ALBUTEROL SULFATE 0.83 MG/ML
2.5 SOLUTION RESPIRATORY (INHALATION)
Qty: 30 NEBULE | Refills: 1 | Status: SHIPPED | OUTPATIENT
Start: 2022-03-07 | End: 2022-07-19 | Stop reason: SDUPTHER

## 2022-03-07 RX ORDER — NAPROXEN 500 MG/1
500 TABLET ORAL 2 TIMES DAILY WITH MEALS
Qty: 60 TABLET | Refills: 1 | Status: SHIPPED | OUTPATIENT
Start: 2022-03-07 | End: 2022-07-19 | Stop reason: SDUPTHER

## 2022-03-07 RX ORDER — CLINDAMYCIN PHOSPHATE AND BENZOYL PEROXIDE 10; 50 MG/G; MG/G
GEL TOPICAL
Qty: 45 G | Refills: 3 | Status: SHIPPED | OUTPATIENT
Start: 2022-03-07 | End: 2022-04-01 | Stop reason: ALTCHOICE

## 2022-03-07 RX ORDER — FAMOTIDINE 40 MG/1
40 TABLET, FILM COATED ORAL DAILY
Qty: 90 TABLET | Refills: 0 | Status: SHIPPED | OUTPATIENT
Start: 2022-03-07 | End: 2022-07-19 | Stop reason: SDUPTHER

## 2022-03-07 RX ORDER — ALBUTEROL SULFATE 90 UG/1
2 AEROSOL, METERED RESPIRATORY (INHALATION)
Qty: 1 EACH | Refills: 3 | Status: SHIPPED | OUTPATIENT
Start: 2022-03-07 | End: 2022-07-19 | Stop reason: SDUPTHER

## 2022-03-07 NOTE — PROGRESS NOTES
[facility] Note     [address] (: 1985) is a 39 y.o. female, established patient, here for evaluation of the following chief complaint(s):  Complete Physical       ASSESSMENT/PLAN:  1. Well woman exam with routine gynecological exam  -     PAP IG, APTIMA HPV AND RFX 16/18,45 ([id number]); Future  2. Mild intermittent asthma without complication  -     albuterol (ProAir HFA) 90 mcg/actuation inhaler; Take 2 Puffs by inhalation every four (4) hours as needed for Wheezing., Normal, Disp-1 Each, R-3  -     albuterol (PROVENTIL VENTOLIN) 2.5 mg /3 mL (0.083 %) nebu; 3 mL by Nebulization route every four (4) hours as needed (wheezing). , Normal, Disp-30 Nebule, R-1  3. Gastroesophageal reflux disease, unspecified whether esophagitis present  -     famotidine (PEPCID) 40 mg tablet; Take 1 Tablet by mouth daily. , Normal, Disp-90 Tablet, R-0      Return in about 1 year (around 3/7/2023), or if symptoms worsen or fail to improve. SUBJECTIVE/OBJECTIVE:    [address] is a 39 y.o. female seen today for well woman exam.     Exercise: daily, walks  Tobacco: Black and mild, 1 a week  ETOH: very rare  SA: yes,  Male   Birth Control / Barrier: none, actively family planning  Menstrual cycle:  Regular, avg every 28 days / Breakthrough bleeding: no  History of abnormal PAP: no  STD screening: deferred  Cervical Cancer Screening: not up to date - due today. Hep C: up to date         REVIEW OF SYSTEMS:    Review of Systems   All other systems reviewed and are negative.         VITAL SIGNS:    Wt Readings from Last 3 Encounters:   22 143 lb 9.6 oz (65.1 kg)   21 155 lb (70.3 kg)   21 152 lb 6.4 oz (69.1 kg)     Temp Readings from Last 3 Encounters:   22 97 °F (36.1 °C) (Temporal)   21 98.3 °F (36.8 °C)   21 99.3 °F (37.4 °C) (Temporal)     BP Readings from Last 3 Encounters:   22 113/69   21 (!) 78/43   21 102/64     Pulse Readings from Last 3 Encounters:   03/07/22 85   09/20/21 90   09/07/21 61           PHYSICAL EXAMINATION:       General: Alert, cooperative, no distress  Eyes: Conjunctivae clear. Pupils equally round and reactive to light, Extraocular muscles intact. Ears: Normal external ear canals both ears. Nose: Nares normal. Septum midline. Mucosa normal. No drainage or sinus tenderness. Mouth/Throat: Lips, mucosa, and tongue normal. No oropharyngeal erythema. No tonsillar enlargement or exudate. Neck: Supple, symmetrical, trachea midline, no adenopathy. No thyroid enlargement/tenderness/nodules  Respiratory: Breathing comfortably, in no acute respiratory distress. Clear to auscultation bilaterally. Normal inspiratory and expiratory ratio. Cardiovascular: Regular rate and rhythm, S1, S2 normal, no murmur, click, rub or gallop. Extremities: no edema. Pulses 2+ and symmetric radial and dorsalis pedis   Abdomen: Soft, non-tender, not distended. Bowel sounds normal. No masses or organomegaly. MSK: Extremities normal appearing, atraumatic, no effusion. Gait steady and unassisted. Skin: Skin color, texture, turgor normal. No rashes or lesions on exposed skin. Lymph nodes: Cervical, supraclavicular nodes normal.  Neurologic: Cranial nerves II-XII intact. Strength 5/5 grossly. Sensation and reflexes normal throughout. Psychiatric: Normal affect. Mood euthymic. Thoughts logical. Speech volume and speed normal    Physical Exam  Genitourinary:      Rectum and urethral meatus normal.      Right Labia: No rash, tenderness or lesions. Left Labia: No tenderness, lesions or rash. Right Adnexa: not tender and no mass present. Left Adnexa: not tender and no mass present. Cervix is nulliparous. Cervix is not parous. No cervical discharge, friability or lesion. Pelvic exam was performed with patient in the lithotomy position. Exam conducted with a chaperone present.                Treatment risks/benefits/costs/interactions/alternatives discussed with patient. Advised patient to call back or return to office if symptoms worsen/change/persist. If patient cannot reach us or should anything more severe/urgent arise he/she should proceed directly to the nearest emergency department. Discussed expected course/resolution/complications of diagnosis in detail with patient. Patient expressed understanding with the diagnosis and plan. An electronic signature was used to authenticate this note.   -- Ranelle Felty, NP

## 2022-03-07 NOTE — PATIENT INSTRUCTIONS
Well Visit, Ages 25 to 48: Care Instructions  Overview     Well visits can help you stay healthy. Your doctor has checked your overall health and may have suggested ways to take good care of yourself. Your doctor also may have recommended tests. At home, you can help prevent illness with healthy eating, regular exercise, and other steps. Follow-up care is a key part of your treatment and safety. Be sure to make and go to all appointments, and call your doctor if you are having problems. It's also a good idea to know your test results and keep a list of the medicines you take. How can you care for yourself at home? · Get screening tests that you and your doctor decide on. Screening helps find diseases before any symptoms appear. · Eat healthy foods. Choose fruits, vegetables, whole grains, protein, and low-fat dairy foods. Limit fat, especially saturated fat. Reduce salt in your diet. · Limit alcohol. If you are a man, have no more than 2 drinks a day or 14 drinks a week. If you are a woman, have no more than 1 drink a day or 7 drinks a week. · Get at least 30 minutes of physical activity on most days of the week. Walking is a good choice. You also may want to do other activities, such as running, swimming, cycling, or playing tennis or team sports. Discuss any changes in your exercise program with your doctor. · Reach and stay at a healthy weight. This will lower your risk for many problems, such as obesity, diabetes, heart disease, and high blood pressure. · Do not smoke or allow others to smoke around you. If you need help quitting, talk to your doctor about stop-smoking programs and medicines. These can increase your chances of quitting for good. · Care for your mental health. It is easy to get weighed down by worry and stress. Learn strategies to manage stress, like deep breathing and mindfulness, and stay connected with your family and community.  If you find you often feel sad or hopeless, talk with your doctor. Treatment can help. · Talk to your doctor about whether you have any risk factors for sexually transmitted infections (STIs). You can help prevent STIs if you wait to have sex with a new partner (or partners) until you've each been tested for STIs. It also helps if you use condoms (male or female condoms) and if you limit your sex partners to one person who only has sex with you. Vaccines are available for some STIs, such as HPV. · Use birth control if it's important to you to prevent pregnancy. Talk with your doctor about the choices available and what might be best for you. · If you think you may have a problem with alcohol or drug use, talk to your doctor. This includes prescription medicines (such as amphetamines and opioids) and illegal drugs (such as cocaine and methamphetamine). Your doctor can help you figure out what type of treatment is best for you. · Protect your skin from too much sun. When you're outdoors from 10 a.m. to 4 p.m., stay in the shade or cover up with clothing and a hat with a wide brim. Wear sunglasses that block UV rays. Even when it's cloudy, put broad-spectrum sunscreen (SPF 30 or higher) on any exposed skin. · See a dentist one or two times a year for checkups and to have your teeth cleaned. · Wear a seat belt in the car. When should you call for help? Watch closely for changes in your health, and be sure to contact your doctor if you have any problems or symptoms that concern you. Where can you learn more? Go to http://www.[employer].com/  Enter P072 in the search box to learn more about \"Well Visit, Ages 25 to 48: Care Instructions. \"  Current as of: February 11, 2021               Content Version: 13.0  © 0915-0100 Healthwise, Incorporated. Care instructions adapted under license by [employer] (which disclaims liability or warranty for this information).  If you have questions about a medical condition or this instruction, always ask your healthcare professional. [place] [address] any warranty or liability for your use of this information.

## 2022-03-07 NOTE — PROGRESS NOTES
1. \"Have you been to the ER, urgent care clinic since your last visit? Hospitalized since your last visit? \" No    2. \"Have you seen or consulted any other health care providers outside of the [address] [place] [address] since your last visit? \" No     3. For patients aged 39-70: Has the patient had a colonoscopy / FIT/ Cologuard? No      If the patient is female:    4. For patients aged 41-77: Has the patient had a mammogram within the past 2 years? No      5. For patients aged 21-65: Has the patient had a pap smear?  No

## 2022-03-18 PROBLEM — M05.80 POLYARTHRITIS WITH POSITIVE RHEUMATOID FACTOR (HCC): Status: ACTIVE | Noted: 2021-08-04

## 2022-03-19 PROBLEM — K21.9 GERD (GASTROESOPHAGEAL REFLUX DISEASE): Status: ACTIVE | Noted: 2021-08-04

## 2022-03-19 PROBLEM — J45.20 MILD INTERMITTENT ASTHMA WITHOUT COMPLICATION: Status: ACTIVE | Noted: 2021-08-04

## 2022-03-19 PROBLEM — R76.8 POSITIVE ANA (ANTINUCLEAR ANTIBODY): Status: ACTIVE | Noted: 2021-08-04

## 2022-04-01 ENCOUNTER — OFFICE VISIT (OUTPATIENT)
Dept: FAMILY MEDICINE CLINIC | Age: 37
End: 2022-04-01
Payer: COMMERCIAL

## 2022-04-01 ENCOUNTER — TELEPHONE (OUTPATIENT)
Dept: RHEUMATOLOGY | Age: 37
End: 2022-04-01

## 2022-04-01 VITALS
SYSTOLIC BLOOD PRESSURE: 96 MMHG | DIASTOLIC BLOOD PRESSURE: 62 MMHG | BODY MASS INDEX: 25.46 KG/M2 | HEART RATE: 87 BPM | TEMPERATURE: 98.8 F | HEIGHT: 65 IN | WEIGHT: 152.8 LBS

## 2022-04-01 DIAGNOSIS — M79.675 PAIN OF TOE OF LEFT FOOT: Primary | ICD-10-CM

## 2022-04-01 PROCEDURE — 99212 OFFICE O/P EST SF 10 MIN: CPT | Performed by: NURSE PRACTITIONER

## 2022-04-01 RX ORDER — CLINDAMYCIN PHOSPHATE 10 UG/ML
LOTION TOPICAL DAILY
Qty: 1 EACH | Refills: 2 | Status: SHIPPED | OUTPATIENT
Start: 2022-04-01

## 2022-04-01 NOTE — TELEPHONE ENCOUNTER
Patient advised the pharmacy told her to have someone come in and show her how to inject herself with the new medication Humira.     [phone]

## 2022-04-01 NOTE — LETTER
NOTIFICATION RETURN TO WORK / SCHOOL    4/1/2022 4:26 PM    Ms. Michelle Miranda  [address]  [address]  [place] [address]      To Whom It May Concern:    Dorinda Tejada is currently under the care of EMMIE Ferris 53. She will return to work/school on: 4/4/2022. If there are questions or concerns please have the patient contact our office.         Sincerely,              Guido Desir NP

## 2022-04-01 NOTE — PROGRESS NOTES
Chief Complaint   Patient presents with    Toe Pain     left pinky toe, x4 days         1. \"Have you been to the ER, urgent care clinic since your last visit? Hospitalized since your last visit? \" No    2. \"Have you seen or consulted any other health care providers outside of the [address] [place] [address] since your last visit? \" No     3. For patients over 45: Has the patient had a colonoscopy? NA - based on age     If the patient is female:    4. For patients over 36: Has the patient had a mammogram? NA - based on age    11. For patients over 21: Has the patient had a pap smear? Yes - no Care Gap present     3 most recent PHQ Screens 3/7/2022   Little interest or pleasure in doing things Not at all   Feeling down, depressed, irritable, or hopeless Not at all   Total Score PHQ 2 0       There are no preventive care reminders to display for this patient.

## 2022-04-01 NOTE — PATIENT INSTRUCTIONS
Foot Pain: Care Instructions  Your Care Instructions     Foot injuries that cause pain and swelling are fairly common. Almost all sports or home repair projects can cause a misstep that ends up as foot pain. Normal wear and tear, especially as you get older, also can cause foot pain. Most minor foot injuries will heal on their own, and home treatment is usually all you need to do. If you have a severe injury, you may need tests and treatment. Follow-up care is a key part of your treatment and safety. Be sure to make and go to all appointments, and call your doctor if you are having problems. It's also a good idea to know your test results and keep a list of the medicines you take. How can you care for yourself at home? · Take pain medicines exactly as directed. ? If the doctor gave you a prescription medicine for pain, take it as prescribed. ? If you are not taking a prescription pain medicine, ask your doctor if you can take an over-the-counter medicine. · Rest and protect your foot. Take a break from any activity that may cause pain. · Put ice or a cold pack on your foot for 10 to 20 minutes at a time. Put a thin cloth between the ice and your skin. · Prop up the sore foot on a pillow when you ice it or anytime you sit or lie down during the next 3 days. Try to keep it above the level of your heart. This will help reduce swelling. · Your doctor may recommend that you wrap your foot with an elastic bandage. Keep your foot wrapped for as long as your doctor advises. · If your doctor recommends crutches, use them as directed. · Wear roomy footwear. · As soon as pain and swelling end, begin gentle exercises of your foot. Your doctor can tell you which exercises will help. When should you call for help? Call 911 anytime you think you may need emergency care. For example, call if:    · Your foot turns pale, white, blue, or cold.    Call your doctor now or seek immediate medical care if:    · You cannot move or stand on your foot.     · Your foot looks twisted or out of its normal position.     · Your foot is not stable when you step down.     · You have signs of infection, such as:  ? Increased pain, swelling, warmth, or redness. ? Red streaks leading from the sore area. ? Pus draining from a place on your foot. ? A fever.     · Your foot is numb or tingly. Watch closely for changes in your health, and be sure to contact your doctor if:    · You do not get better as expected.     · You have bruises from an injury that last longer than 2 weeks. Where can you learn more? Go to http://www.[employer].com/  Enter D999 in the search box to learn more about \"Foot Pain: Care Instructions. \"  Current as of: July 1, 2021               Content Version: 13.2  © 5313-2018 Healthwise, Incorporated. Care instructions adapted under license by [employer] (which disclaims liability or warranty for this information). If you have questions about a medical condition or this instruction, always ask your healthcare professional. [place] [address] any warranty or liability for your use of this information.

## 2022-04-02 LAB
ALBUMIN SERPL-MCNC: 3.8 G/DL (ref 3.5–5)
ALBUMIN/GLOB SERPL: 1.2 {RATIO} (ref 1.1–2.2)
ALP SERPL-CCNC: 60 U/L (ref 45–117)
ALT SERPL-CCNC: 20 U/L (ref 12–78)
ANION GAP SERPL CALC-SCNC: 3 MMOL/L (ref 5–15)
AST SERPL-CCNC: 12 U/L (ref 15–37)
BASOPHILS # BLD: 0.1 K/UL (ref 0–0.1)
BASOPHILS NFR BLD: 1 % (ref 0–1)
BILIRUB SERPL-MCNC: 0.4 MG/DL (ref 0.2–1)
BUN SERPL-MCNC: 14 MG/DL (ref 6–20)
BUN/CREAT SERPL: 15 (ref 12–20)
CALCIUM SERPL-MCNC: 9.1 MG/DL (ref 8.5–10.1)
CHLORIDE SERPL-SCNC: 107 MMOL/L (ref 97–108)
CO2 SERPL-SCNC: 28 MMOL/L (ref 21–32)
CREAT SERPL-MCNC: 0.92 MG/DL (ref 0.55–1.02)
DIFFERENTIAL METHOD BLD: ABNORMAL
EOSINOPHIL # BLD: 0.3 K/UL (ref 0–0.4)
EOSINOPHIL NFR BLD: 5 % (ref 0–7)
ERYTHROCYTE [DISTWIDTH] IN BLOOD BY AUTOMATED COUNT: 12.4 % (ref 11.5–14.5)
GLOBULIN SER CALC-MCNC: 3.2 G/DL (ref 2–4)
GLUCOSE SERPL-MCNC: 96 MG/DL (ref 65–100)
HCT VFR BLD AUTO: 35.9 % (ref 35–47)
HGB BLD-MCNC: 11.6 G/DL (ref 11.5–16)
IMM GRANULOCYTES # BLD AUTO: 0 K/UL (ref 0–0.04)
IMM GRANULOCYTES NFR BLD AUTO: 0 % (ref 0–0.5)
LYMPHOCYTES # BLD: 2.5 K/UL (ref 0.8–3.5)
LYMPHOCYTES NFR BLD: 38 % (ref 12–49)
MCH RBC QN AUTO: 32 PG (ref 26–34)
MCHC RBC AUTO-ENTMCNC: 32.3 G/DL (ref 30–36.5)
MCV RBC AUTO: 98.9 FL (ref 80–99)
MONOCYTES # BLD: 0.7 K/UL (ref 0–1)
MONOCYTES NFR BLD: 10 % (ref 5–13)
NEUTS SEG # BLD: 3 K/UL (ref 1.8–8)
NEUTS SEG NFR BLD: 46 % (ref 32–75)
NRBC # BLD: 0 K/UL (ref 0–0.01)
NRBC BLD-RTO: 0 PER 100 WBC
PLATELET # BLD AUTO: 293 K/UL (ref 150–400)
PMV BLD AUTO: 11.3 FL (ref 8.9–12.9)
POTASSIUM SERPL-SCNC: 4.4 MMOL/L (ref 3.5–5.1)
PROT SERPL-MCNC: 7 G/DL (ref 6.4–8.2)
RBC # BLD AUTO: 3.63 M/UL (ref 3.8–5.2)
SODIUM SERPL-SCNC: 138 MMOL/L (ref 136–145)
URATE SERPL-MCNC: 5 MG/DL (ref 2.6–6)
WBC # BLD AUTO: 6.6 K/UL (ref 3.6–11)

## 2022-04-04 ENCOUNTER — TELEPHONE (OUTPATIENT)
Dept: FAMILY MEDICINE CLINIC | Age: 37
End: 2022-04-04

## 2022-04-04 NOTE — TELEPHONE ENCOUNTER
NP [employer],    Call from Elan Martins. Patient will pay Out of Pocket for the original clindamycin-benzoyl peroxide gel. It was cancelled out when the alternative therapy sent on 4/1/22. Re-Entered if appropriate. Please Review. [employer]    Previous Refill Encounter(s): 3/7/22 45g + 3    Requested Prescriptions     Pending Prescriptions Disp Refills    clindamycin-benzoyl Peroxide (DUAC) 1.2 %(1 % base) -5 % SR topical gel 45 g 3     Sig: Apply to affected area nightly. Patient to pay out of pocket.

## 2022-04-04 NOTE — TELEPHONE ENCOUNTER
----- Message from Sudeep Frost sent at 4/4/2022  4:36 PM EDT -----  Subject: Message to Provider    QUESTIONS  Information for Provider? Patient called back to see if provider will   extend off from work form. Please contact patient for further information. She stated that her toe is still swollen. ---------------------------------------------------------------------------  --------------  Edgar GALVEZ  What is the best way for the office to contact you? OK to leave message on   voicemail  Preferred Call Back Phone Number? [phone]  ---------------------------------------------------------------------------  --------------  SCRIPT ANSWERS  Relationship to Patient?  Self

## 2022-04-04 NOTE — TELEPHONE ENCOUNTER
Patient called and wants to know if out of work letter can be extended. Patient is still unable to put shoe on due to toe.     Requesting a call back    Best call back# [phone]

## 2022-04-05 ENCOUNTER — TELEPHONE (OUTPATIENT)
Dept: FAMILY MEDICINE CLINIC | Age: 37
End: 2022-04-05

## 2022-04-05 RX ORDER — CLINDAMYCIN PHOSPHATE AND BENZOYL PEROXIDE 10; 50 MG/G; MG/G
GEL TOPICAL
Qty: 45 G | Refills: 3 | Status: SHIPPED | OUTPATIENT
Start: 2022-04-05 | End: 2022-07-19 | Stop reason: SDUPTHER

## 2022-04-05 RX ORDER — INDOMETHACIN 25 MG/1
25 CAPSULE ORAL
Qty: 10 CAPSULE | Refills: 0 | Status: SHIPPED | OUTPATIENT
Start: 2022-04-05 | End: 2022-04-08

## 2022-04-05 RX ORDER — COLCHICINE 0.6 MG/1
TABLET ORAL
Qty: 3 TABLET | Refills: 0 | Status: SHIPPED | OUTPATIENT
Start: 2022-04-05

## 2022-04-05 NOTE — PROGRESS NOTES
Uric acid levels are on the upper end of normal. I suspect the sudden onset toe pain is related to gout. Gout is a common and complex form of arthritis that can affect anyone. It's characterized by sudden, severe attacks of pain, swelling, redness and tenderness in one or more joints, most often in the big toe. In her case its the small toe. This is treated with gout medication and diet adjustments. Do your best to limit the purines in your body, since these chemicals can trigger uric acid buildup. Foods and drinks containing high purine levels include:  \" Alcohol - including beer, vodka,whiskey  \" Red meat and organ meats (liver, for example). \" Shellfish such as shrimp, lobster, mussels, anchovies  \" Gravy. \" Drinks and foods high in fructose (fruit sugar). \" Protein from animal sources. All protein from animal flesh can potentially lead to elevated uric acid levels.

## 2022-04-05 NOTE — TELEPHONE ENCOUNTER
Attempted to call patient. Left voicemail that two medications were sent in which were doses for an acute gout flare up which is different than a maintenance medication as it is only for one day so if she is still having the pain she should take it. Provided patient names of medications and to call the office back with any questions.

## 2022-04-05 NOTE — PROGRESS NOTES
[facility] Note     [address] (: 1985) is a 39 y.o. female, established patient, here for evaluation of the following chief complaint(s): Toe Pain (left pinky toe, x4 days)       ASSESSMENT/PLAN:  1. Pain of toe of left foot  -     XR FOOT LT MIN 3 V; Future  -     CBC WITH AUTOMATED DIFF; Future  -     METABOLIC PANEL, COMPREHENSIVE; Future  -     URIC ACID; Future  - Rest, elevation and ibuprofen prn      Return if symptoms worsen or fail to improve. SUBJECTIVE/OBJECTIVE:    [address] is a 39 y.o. female seen today for new onset toe pain. Reports new onset left # 5 toe pain with redness and swelling x 1 day. The site it tender to touch and with weight bearing. Denies injury. Denies previous occurrence. REVIEW OF SYSTEMS:    Review of Systems   Constitutional: Negative. HENT: Negative. Respiratory: Negative. Cardiovascular: Negative. Gastrointestinal: Negative. Genitourinary: Negative. Musculoskeletal: Negative for back pain, myalgias and neck pain. Left small toe pain   Skin: Negative. Neurological: Negative. Psychiatric/Behavioral: Negative. VITAL SIGNS:    Wt Readings from Last 3 Encounters:   22 152 lb 12.8 oz (69.3 kg)   22 143 lb 9.6 oz (65.1 kg)   21 155 lb (70.3 kg)     Temp Readings from Last 3 Encounters:   22 98.8 °F (37.1 °C) (Temporal)   22 97 °F (36.1 °C) (Temporal)   21 98.3 °F (36.8 °C)     BP Readings from Last 3 Encounters:   22 96/62   22 113/69   21 (!) 78/43     Pulse Readings from Last 3 Encounters:   22 87   22 85   21 90           PHYSICAL EXAMINATION:       General: Alert, cooperative, no distress  Respiratory: Breathing comfortably, in no acute respiratory distress. Clear to auscultation bilaterally. Cardiovascular: Regular rate and rhythm, S1, S2 normal, no murmur, click, rub or gallop.    Extremities: no edema. Abdomen: Soft, non-tender, not distended. Bowel sounds normal. No masses or organomegaly. MSK: left # 5 toe tender to palpation and movement. Mild erythema and edema. No wounds or ulcerations. Extremities normal appearing, atraumatic, no effusion. Gait steady and unassisted. Skin: Skin color, texture, turgor normal. No rashes or lesions on exposed skin. Neurologic: A/Ox3  Psychiatric: Normal affect. Mood euthymic. Thoughts logical. Speech volume and speed normal            Treatment risks/benefits/costs/interactions/alternatives discussed with patient. Advised patient to call back or return to office if symptoms worsen/change/persist. If patient cannot reach us or should anything more severe/urgent arise he/she should proceed directly to the nearest emergency department. Discussed expected course/resolution/complications of diagnosis in detail with patient. Patient expressed understanding with the diagnosis and plan. An electronic signature was used to authenticate this note.   -- Randi Zacarias NP

## 2022-04-05 NOTE — PROGRESS NOTES
Called patient. Two patient identifiers verified. Discussed lab results per provider's note. Patient said that already does not eat most of these things as she does not eat meat or shrimp and does not drink, but she does eat fruit. Patient would like the medication sent in but she will look into it more before deciding if she will take it.

## 2022-07-19 DIAGNOSIS — J45.20 MILD INTERMITTENT ASTHMA WITHOUT COMPLICATION: ICD-10-CM

## 2022-07-19 DIAGNOSIS — K21.9 GASTROESOPHAGEAL REFLUX DISEASE, UNSPECIFIED WHETHER ESOPHAGITIS PRESENT: ICD-10-CM

## 2022-07-19 RX ORDER — CLINDAMYCIN PHOSPHATE AND BENZOYL PEROXIDE 10; 50 MG/G; MG/G
GEL TOPICAL
Qty: 45 G | Refills: 3 | Status: SHIPPED | OUTPATIENT
Start: 2022-07-19

## 2022-07-19 RX ORDER — FAMOTIDINE 40 MG/1
40 TABLET, FILM COATED ORAL DAILY
Qty: 90 TABLET | Refills: 0 | Status: SHIPPED | OUTPATIENT
Start: 2022-07-19

## 2022-07-19 RX ORDER — ALBUTEROL SULFATE 90 UG/1
2 AEROSOL, METERED RESPIRATORY (INHALATION)
Qty: 1 EACH | Refills: 3 | Status: SHIPPED | OUTPATIENT
Start: 2022-07-19

## 2022-07-19 RX ORDER — NAPROXEN 500 MG/1
500 TABLET ORAL 2 TIMES DAILY WITH MEALS
Qty: 60 TABLET | Refills: 1 | Status: SHIPPED | OUTPATIENT
Start: 2022-07-19

## 2022-07-19 RX ORDER — ALBUTEROL SULFATE 0.83 MG/ML
2.5 SOLUTION RESPIRATORY (INHALATION)
Qty: 30 NEBULE | Refills: 1 | Status: SHIPPED | OUTPATIENT
Start: 2022-07-19

## 2022-07-19 NOTE — TELEPHONE ENCOUNTER
Patient call via call center for refills below to [facility].  (change in pharmacy)  Tessie Beltran    Last Visit: 4/1/22 NP Sean Gibson  Next Appointment: None  Previous Refill Encounter(s): ([facility])  Albuterol inhaler 3/7/22 1 + 3  Albuterol nebs 3/7/22 30 + 1  Clindamycin-benzoyl perox 4/5/22 45g + 3   Famotidine  3/7/22 90  Naproxen 3/7/22 60 + 1    Requested Prescriptions     Pending Prescriptions Disp Refills    albuterol (ProAir HFA) 90 mcg/actuation inhaler 1 Each 3     Sig: Take 2 Puffs by inhalation every four (4) hours as needed for Wheezing.  albuterol (PROVENTIL VENTOLIN) 2.5 mg /3 mL (0.083 %) nebu 30 Nebule 1     Sig: 3 mL by Nebulization route every four (4) hours as needed (wheezing).  clindamycin-benzoyl Peroxide (DUAC) 1.2 %(1 % base) -5 % SR topical gel 45 g 3     Sig: Apply to affected area nightly. Patient to pay out of pocket.  famotidine (PEPCID) 40 mg tablet 90 Tablet 0     Sig: Take 1 Tablet by mouth daily.  naproxen (NAPROSYN) 500 mg tablet 60 Tablet 1     Sig: Take 1 Tablet by mouth two (2) times daily (with meals). Take with food     For Pharmacy [address] in place:    Recommendation Provided To:    Intervention Detail: New Rx: 5, reason: Patient Preference   Gap Closed?:    Intervention Accepted By:   Paula Kong Time Spent (min): 10      Name of Medication? famotidine (PEPCID) 40 mg tablet   Patient-reported dosage and instructions? once daily   How many days do you have left? 0   Preferred Pharmacy? [facility]/PH[facility]ACY #[id number]   Pharmacy phone number (if available)? [phone]   ---------------------------------------------------------------------------   --------------,   Name of Medication? naproxen (NAPROSYN) 500 mg tablet   Patient-reported dosage and instructions? as needed   How many days do you have left? 5   Preferred Pharmacy? [facility]/PHARMACY #[id number]   Pharmacy phone number (if available)?  [phone] ---------------------------------------------------------------------------   --------------,   Name of Medication? albuterol (ProAir HFA) 90 mcg/actuation inhaler   Patient-reported dosage and instructions? as needed   How many days do you have left? 5   Preferred Pharmacy? [facility]/PHARMACY #[id number]   Pharmacy phone number (if available)? [phone]   ---------------------------------------------------------------------------   --------------,   Name of Medication? albuterol (PROVENTIL VENTOLIN) 2.5 mg /3 mL (0.083 %)   nebu   Patient-reported dosage and instructions? as needed   How many days do you have left? 0   Preferred Pharmacy? [facility]/PHARMACY #[id number]   Pharmacy phone number (if available)? [phone]   ---------------------------------------------------------------------------   --------------,   Name of Medication? clindamycin-benzoyl Peroxide (DUAC) 1.2 %(1 % base) -5   % SR topical gel   Patient-reported dosage and instructions? as needed   How many days do you have left? 0   Preferred Pharmacy? [facility]/PHARMACY #[id number]   Pharmacy phone number (if available)? [phone]   Additional Information for Provider? pt called to change pharmacies on   file no refills were submitted when she called in to do this. asked for   these to be sent to new [facility] pharmacy on [address]   ---------------------------------------------------------------------------   --------------   [address]   What is the best way for the office to contact you? OK to leave message on   voicemail   Preferred Call Back Phone Number?  [phone]   ---------------------------------------------------------------------------   --------------

## 2022-08-01 ENCOUNTER — DOCUMENTATION ONLY (OUTPATIENT)
Dept: RHEUMATOLOGY | Age: 37
End: 2022-08-01

## 2022-08-01 NOTE — PROGRESS NOTES
No show today (previous Valerie)- never been seen by Dr. Deborah Poon or myself. She now has to be seen as a new patient only. Also no showed last visit with Dr. Jeffrey Lundberg. Last appt here was 9/2021.

## 2022-08-15 ENCOUNTER — TELEPHONE (OUTPATIENT)
Dept: RHEUMATOLOGY | Age: 37
End: 2022-08-15

## 2022-08-23 ENCOUNTER — TELEPHONE (OUTPATIENT)
Dept: FAMILY MEDICINE CLINIC | Age: 37
End: 2022-08-23

## 2022-08-23 DIAGNOSIS — M05.80 POLYARTHRITIS WITH POSITIVE RHEUMATOID FACTOR (HCC): Primary | ICD-10-CM

## 2022-08-23 NOTE — TELEPHONE ENCOUNTER
Called patient. Two patient identifiers verified. Pt said she would like the referral to [facility] so she can make an appointment there for now and she will check with other places to see if they take her insurance but she would like to make an appointment for the office next door now since they told her it would be a year out and she was told she would need a new referral before scheduling.

## 2022-08-23 NOTE — TELEPHONE ENCOUNTER
Called patient. Two patient identifiers verified. Due to missing appts, pt to be considered new patient at [facility] where she was previously being seen and was told she would need a new referral. Pt asked if there was anywhere else she could be referred to as she would not be able to get into there for a while now.     ----- Message from [employer] sent at 8/23/2022 10:46 AM EDT -----  Subject: Referral Request    Reason for referral request? Rheumatology   Provider patient wants to be referred to(if known):     Provider Phone Number(if known):     Additional Information for Provider?   ---------------------------------------------------------------------------  --------------  [facility]    [phone]; OK to leave message on voicemail  ---------------------------------------------------------------------------  --------------

## 2022-08-24 ENCOUNTER — OFFICE VISIT (OUTPATIENT)
Dept: FAMILY MEDICINE CLINIC | Age: 37
End: 2022-08-24
Payer: COMMERCIAL

## 2022-08-24 VITALS
WEIGHT: 149.4 LBS | TEMPERATURE: 98.8 F | HEIGHT: 65 IN | DIASTOLIC BLOOD PRESSURE: 65 MMHG | BODY MASS INDEX: 24.89 KG/M2 | OXYGEN SATURATION: 97 % | SYSTOLIC BLOOD PRESSURE: 101 MMHG | RESPIRATION RATE: 16 BRPM | HEART RATE: 83 BPM

## 2022-08-24 DIAGNOSIS — I73.00 RAYNAUD'S PHENOMENON WITHOUT GANGRENE: ICD-10-CM

## 2022-08-24 DIAGNOSIS — K21.9 GASTROESOPHAGEAL REFLUX DISEASE, UNSPECIFIED WHETHER ESOPHAGITIS PRESENT: Primary | ICD-10-CM

## 2022-08-24 DIAGNOSIS — M05.80 POLYARTHRITIS WITH POSITIVE RHEUMATOID FACTOR (HCC): ICD-10-CM

## 2022-08-24 PROCEDURE — 99213 OFFICE O/P EST LOW 20 MIN: CPT | Performed by: NURSE PRACTITIONER

## 2022-08-24 RX ORDER — NIFEDIPINE 30 MG/1
30 TABLET, FILM COATED, EXTENDED RELEASE ORAL DAILY
Qty: 90 TABLET | Refills: 1 | Status: SHIPPED | OUTPATIENT
Start: 2022-08-24

## 2022-08-24 NOTE — PROGRESS NOTES
Chief Complaint   Patient presents with    Knee Swelling    Ankle swelling    Other     Poor circulation in fingers, will appear white or purple         1. \"Have you been to the ER, urgent care clinic since your last visit? Hospitalized since your last visit? \" No    2. \"Have you seen or consulted any other health care providers outside of the [address] [place] [address] since your last visit? \" No     3. For patients over 45: Has the patient had a colonoscopy? NA - based on age     If the patient is female:    4. For patients over 36: Has the patient had a mammogram? NA - based on age    11. For patients over 21: Has the patient had a pap smear?  Yes - no Care Gap present     3 most recent PHQ Screens 8/24/2022   Little interest or pleasure in doing things Not at all   Feeling down, depressed, irritable, or hopeless Several days   Total Score PHQ 2 1       Health Maintenance Due   Topic Date Due    COVID-19 Vaccine (1) Never done    Pneumococcal 0-64 years (1 - PCV) Never done

## 2022-08-24 NOTE — PROGRESS NOTES
[facility] Note      [address] (: 1985) is a 40 y.o. female, established patient, here for evaluation of the following chief complaint(s):  Knee Swelling, Ankle swelling, and Other (Poor circulation in fingers, will appear white or purple)       ASSESSMENT/PLAN:  1. Gastroesophageal reflux disease, unspecified whether esophagitis present  -     H PYLORI AG, STOOL; Future  - Breakthrough symptoms with regular use of famotidine  -Consider GI referral    2. Polyarthritis with positive rheumatoid factor (HCC)  - Will need to re-establish care with Rheumatology. Referral placed. - Did not start Humara    3. Raynaud's phenomenon without gangrene  -  TRIAL   NIFEdipine ER (ADALAT CC) 30 mg ER tablet; Take 1 Tablet by mouth daily. , Normal, Disp-90 Tablet, R-1      Return if symptoms worsen or fail to improve. SUBJECTIVE/OBJECTIVE:     [address] is a 40 y.o. female seen today for fingers cold, RA, GERD      Osteoarthritis and Chronic Pain:  Patient has arthralgias, primarily affecting the knees and joints. Patient previously seen by rheumatology but due to missed appointments she will need to reestablish care. Known hx of changes with weather to her fingertips in which they would become cold, white or purple. This would typically occur during cold weather which improved with use of gloves or heating pads. The patient now reports increasing frequency and severity throughout the year. Episodes average at least twice a week. She will run her hands under warm water, tucked under her extremities or wrap blankets around them which improves her symptoms. GERD:  Hx of GERD with persistent symptoms despite regular use of famotidine. No blood in the stool. REVIEW OF SYSTEMS:    Review of Systems   Constitutional: Negative. HENT: Negative. Respiratory: Negative. Cardiovascular: Negative.     Gastrointestinal:  Negative for abdominal distention, anal bleeding, blood in stool, constipation and diarrhea. Acid reflux   Genitourinary: Negative. Skin:  Positive for color change. Negative for rash and wound. Color changes from red, purple or white to fingertips with temperature change   Neurological: Negative. VITAL SIGNS:    Wt Readings from Last 3 Encounters:   08/24/22 149 lb 6.4 oz (67.8 kg)   04/01/22 152 lb 12.8 oz (69.3 kg)   03/07/22 143 lb 9.6 oz (65.1 kg)     Temp Readings from Last 3 Encounters:   08/24/22 98.8 °F (37.1 °C) (Temporal)   04/01/22 98.8 °F (37.1 °C) (Temporal)   03/07/22 97 °F (36.1 °C) (Temporal)     BP Readings from Last 3 Encounters:   08/24/22 101/65   04/01/22 96/62   03/07/22 113/69     Pulse Readings from Last 3 Encounters:   08/24/22 83   04/01/22 87   03/07/22 85           PHYSICAL EXAMINATION:       General: Alert, cooperative, no distress  Respiratory: Breathing comfortably, in no acute respiratory distress. Clear to auscultation bilaterally. Cardiovascular: Regular rate and rhythm, S1, S2 normal, no murmur, click, rub or gallop. Extremities: no edema. Abdomen: Soft, non-tender, not distended. Bowel sounds normal. No masses or organomegaly. MSK: Extremities normal appearing, atraumatic, no effusion. Gait steady and unassisted. Skin: Skin color, texture, turgor normal. No rashes or lesions on exposed skin. Neurologic: A/Ox3  Psychiatric: Normal affect. Mood euthymic. Thoughts logical. Speech volume and speed normal            Treatment risks/benefits/costs/interactions/alternatives discussed with patient. Advised patient to call back or return to office if symptoms worsen/change/persist. If patient cannot reach us or should anything more severe/urgent arise he/she should proceed directly to the nearest emergency department. Discussed expected course/resolution/complications of diagnosis in detail with patient. Patient expressed understanding with the diagnosis and plan.      An electronic signature was used to authenticate this note.   -- Zully Busby, NP

## 2022-09-09 LAB
H PYLORI AG STL QL IA: NEGATIVE
SPECIMEN SOURCE: NORMAL

## 2022-10-14 NOTE — TELEPHONE ENCOUNTER
[phone] spoke to patient she's aware that clindamycin is not covered and she is paying cash for it I advised prescription was sent to pharmacy Limited prognosis with vision post cataract surgery understood and accepted.

## 2023-04-05 RX ORDER — FAMOTIDINE 40 MG/1
TABLET, FILM COATED ORAL
Qty: 90 TABLET | Refills: 0
Start: 2023-04-05

## 2023-04-06 NOTE — TELEPHONE ENCOUNTER
Requested Prescriptions     Pending Prescriptions Disp Refills    famotidine (PEPCID) 40 mg tablet [Pharmacy Med Name: FAMOTIDINE 40MG TABS] 90 Tablet 0     Sig: TAKE 1 TABLET BY MOUTH ONE TIME A DAY

## 2023-04-28 ENCOUNTER — TELEPHONE (OUTPATIENT)
Dept: RHEUMATOLOGY | Age: 38
End: 2023-04-28

## 2023-04-28 NOTE — TELEPHONE ENCOUNTER
Called pt to cancel NPT appointment due to provider leaving the practice in the summer. Pt did not answer, left vm and a brief message about cancelling appointment and to call back the office to confirm.

## 2023-05-04 ENCOUNTER — TELEPHONE (OUTPATIENT)
Dept: RHEUMATOLOGY | Age: 38
End: 2023-05-04

## 2023-05-11 ENCOUNTER — TELEPHONE (OUTPATIENT)
Age: 38
End: 2023-05-11

## 2023-05-11 NOTE — TELEPHONE ENCOUNTER
Called pt to cancel NPT appointment due to provider leaving the practice in the summer. Pt phone was busy and could not leave a vm.  (3rd attempt)

## 2023-06-09 DIAGNOSIS — K21.9 GASTRO-ESOPHAGEAL REFLUX DISEASE WITHOUT ESOPHAGITIS: ICD-10-CM

## 2023-06-09 RX ORDER — FAMOTIDINE 40 MG/1
TABLET, FILM COATED ORAL
Qty: 30 TABLET | Refills: 2 | Status: SHIPPED | OUTPATIENT
Start: 2023-06-09

## 2023-06-09 NOTE — TELEPHONE ENCOUNTER
PCP: YAMILETH Bullock NP    Last appt: 8/24/2022   No future appointments.     Requested Prescriptions     Pending Prescriptions Disp Refills    famotidine (PEPCID) 40 MG tablet [Pharmacy Med Name: FAMOTIDINE 40 MG TABLET] 30 tablet 2     Sig: TAKE 1 TABLET BY MOUTH EVERY DAY         Prior labs and Blood pressures:  BP Readings from Last 3 Encounters:   08/24/22 101/65   04/01/22 96/62   03/07/22 113/69     Lab Results   Component Value Date/Time     04/01/2022 04:31 PM    K 4.4 04/01/2022 04:31 PM     04/01/2022 04:31 PM    CO2 28 04/01/2022 04:31 PM    BUN 14 04/01/2022 04:31 PM    GFRAA >60 04/01/2022 04:31 PM     No results found for: HBA1C, VRS3SEFH  No results found for: CHOL, CHOLPOCT, CHOLX, CHLST, CHOLV, HDL, HDLPOC, HDLC, LDL, LDLC, VLDLC, VLDL, TGLX, TRIGL  No results found for: VITD3, VD3RIA    Lab Results   Component Value Date/Time    TSH 0.689 11/14/2019 10:23 AM

## 2023-09-13 DIAGNOSIS — K21.9 GASTRO-ESOPHAGEAL REFLUX DISEASE WITHOUT ESOPHAGITIS: ICD-10-CM

## 2023-09-13 NOTE — TELEPHONE ENCOUNTER
PCP: Althea Carmona, APRN - NP    Last appt: 8/24/2022   No future appointments.     Requested Prescriptions     Pending Prescriptions Disp Refills    famotidine (PEPCID) 40 MG tablet 30 tablet 2     Sig: Take 1 tablet by mouth daily    naproxen (NAPROSYN) 500 MG tablet 60 tablet      Sig: Take 1 tablet by mouth 2 times daily (with meals)    albuterol sulfate HFA (PROVENTIL;VENTOLIN;PROAIR) 108 (90 Base) MCG/ACT inhaler 18 g      Sig: Inhale 2 puffs into the lungs every 4 hours as needed    albuterol (PROVENTIL) (2.5 MG/3ML) 0.083% nebulizer solution 120 each      Sig: 3 mLs every 4 hours as needed    Clindamycin-Benzoyl Per, Refr, 1.2-5 % GEL           Prior labs and Blood pressures:  BP Readings from Last 3 Encounters:   08/24/22 101/65   04/01/22 96/62   03/07/22 113/69     Lab Results   Component Value Date/Time     04/01/2022 04:31 PM    K 4.4 04/01/2022 04:31 PM     04/01/2022 04:31 PM    CO2 28 04/01/2022 04:31 PM    BUN 14 04/01/2022 04:31 PM    GFRAA >60 04/01/2022 04:31 PM     No results found for: \"HBA1C\", \"ASP1TOGB\"  No results found for: \"CHOL\", \"CHOLPOCT\", \"CHOLX\", \"CHLST\", \"CHOLV\", \"HDL\", \"HDLPOC\", \"HDLC\", \"LDL\", \"LDLC\", \"VLDLC\", \"VLDL\", \"TGLX\", \"TRIGL\"  No results found for: \"VITD3\", \"VD3RIA\"    Lab Results   Component Value Date/Time    TSH 0.689 11/14/2019 10:23 AM

## 2023-09-13 NOTE — TELEPHONE ENCOUNTER
----- Message from [address] [place] [address] sent at 9/13/2023  1:53 PM EDT -----  Subject: Refill Request    QUESTIONS  Name of Medication? famotidine (PEPCID) 40 MG tablet  Patient-reported dosage and instructions? 1 tab daily   How many days do you have left? 0  Preferred Pharmacy? [facility]/PHARMACY #[id number]  Pharmacy phone number (if available)? [phone]  Additional Information for Provider? The patient is needing refills on the   medication listed. No refills currently.   ---------------------------------------------------------------------------  --------------,  Name of Medication? Other - EPI PEN   Patient-reported dosage and instructions? As needed / as prescribed  How many days do you have left? 0  Preferred Pharmacy? "[facility]/PHARMACY #[id number]  Pharmacy phone number (if available)? [phone]  Additional Information for Provider? The patient is needing refills on the   medication listed. No refills currently.   ---------------------------------------------------------------------------  --------------,  Name of Medication? naproxen (NAPROSYN) 500 MG tablet  Patient-reported dosage and instructions? 1 as needed  How many days do you have left? 0  Preferred Pharmacy? "[facility]/PHARMACY #[id number]  Pharmacy phone number (if available)? [phone]  Additional Information for Provider? The patient is needing refills on the   medication listed. No refills currently.   ---------------------------------------------------------------------------  --------------,  Name of Medication? albuterol sulfate HFA (PROVENTIL;VENTOLIN;PROAIR) 108   (90 Base) MCG/ACT inhaler  Patient-reported dosage and instructions? as prescribed  How many days do you have left? 0  Preferred Pharmacy? [facility]/PHARMACY #[id number]  Pharmacy phone number (if available)? [phone]  Additional Information for Provider? The patient is needing refills on the   medication listed. No refills currently.

## 2023-09-14 ENCOUNTER — TELEPHONE (OUTPATIENT)
Age: 38
End: 2023-09-14

## 2023-09-14 DIAGNOSIS — J45.20 MILD INTERMITTENT ASTHMA WITHOUT COMPLICATION: Primary | ICD-10-CM

## 2023-09-14 RX ORDER — FAMOTIDINE 40 MG/1
40 TABLET, FILM COATED ORAL DAILY
Qty: 30 TABLET | Refills: 0 | Status: SHIPPED | OUTPATIENT
Start: 2023-09-14

## 2023-09-14 RX ORDER — ALBUTEROL SULFATE 2.5 MG/3ML
2.5 SOLUTION RESPIRATORY (INHALATION) EVERY 4 HOURS PRN
Qty: 120 EACH | Refills: 0 | Status: SHIPPED | OUTPATIENT
Start: 2023-09-14

## 2023-09-14 RX ORDER — ALBUTEROL SULFATE 90 UG/1
2 AEROSOL, METERED RESPIRATORY (INHALATION) EVERY 4 HOURS PRN
Qty: 18 G | Refills: 0 | Status: SHIPPED | OUTPATIENT
Start: 2023-09-14

## 2023-09-14 RX ORDER — NAPROXEN 500 MG/1
500 TABLET ORAL 2 TIMES DAILY WITH MEALS
Qty: 60 TABLET | Refills: 0 | Status: SHIPPED | OUTPATIENT
Start: 2023-09-14

## 2023-09-14 RX ORDER — CLINDAMYCIN PHOSPHATE AND BENZOYL PEROXIDE 10; 50 MG/G; MG/G
GEL TOPICAL
OUTPATIENT
Start: 2023-09-14

## 2023-09-14 NOTE — TELEPHONE ENCOUNTER
----- Message from [address] [place] [address] sent at 9/13/2023  1:54 PM EDT -----  Subject: Referral Request    Reason for referral request? The patient is needing a new breathing   machine for her asthma. Provider patient wants to be referred to(if known):     Provider Phone Number(if known): Additional Information for Provider?  Please call the pt back to update her   on the status of the request.  ---------------------------------------------------------------------------  --------------  [employer] Leonel    [phone]; OK to leave message on voicemail  ---------------------------------------------------------------------------  --------------

## 2023-09-15 NOTE — TELEPHONE ENCOUNTER
LVMT[facility] office when pt calls back please inform her that NP Cheryl Velazquez had approved all but one of her medication's. And that she need's to make a Physical Appt )(last cpe 3.7.22) as well as a medication follow-up.

## 2023-09-19 NOTE — TELEPHONE ENCOUNTER
Called patient. Two patient identifiers verified. Informed pt NP Breanna Zaldivar had placed order for nebulizer. Pt would like it faxed to [facility] on file. Order faxed and confirmed. Pt is also requesting a Epi pen. When [facility] was her PCP, she had an emergency where she had an allergic reaction and had to be taken to the hospital. The Epi pen was previously prescribed by the hospital so it was never prescribed by our office but her previous PCP was aware she had it. She had a situation recently where she felt like she might have needed to use it again and she noticed it was  so she would like an order for a new one to keep on hand.

## 2023-09-21 RX ORDER — EPINEPHRINE 0.3 MG/.3ML
0.3 INJECTION SUBCUTANEOUS ONCE
Qty: 0.3 ML | Refills: 1 | Status: SHIPPED | OUTPATIENT
Start: 2023-09-21 | End: 2023-09-21

## 2023-09-26 ENCOUNTER — TELEPHONE (OUTPATIENT)
Age: 38
End: 2023-09-26

## 2023-09-26 RX ORDER — CLINDAMYCIN PHOSPHATE AND BENZOYL PEROXIDE 10; 50 MG/G; MG/G
GEL TOPICAL
OUTPATIENT
Start: 2023-09-26

## 2023-09-26 NOTE — TELEPHONE ENCOUNTER
PCP: Pearla Cabot, APRN - NP    Last appt: 8/24/2022   Future Appointments   Date Time Provider [address]   10/27/2023  1:00 PM YAMILETH Potts NP [facility]       Requested Prescriptions     Pending Prescriptions Disp Refills    Clindamycin-Benzoyl Per, Refr, 1.2-5 % GEL           Prior labs and Blood pressures:  BP Readings from Last 3 Encounters:   08/24/22 101/65   04/01/22 96/62   03/07/22 113/69     Lab Results   Component Value Date/Time     04/01/2022 04:31 PM    K 4.4 04/01/2022 04:31 PM     04/01/2022 04:31 PM    CO2 28 04/01/2022 04:31 PM    BUN 14 04/01/2022 04:31 PM    GFRAA >60 04/01/2022 04:31 PM     No results found for: \"HBA1C\", \"[id number]\"  No results found for: \"CHOL\", \"CHOLPOCT\", \"CHOLX\", \"CHLST\", \"CHOLV\", \"HDL\", \"HDLPOC\", \"HDLC\", \"LDL\", \"LDLC\", \"VLDLC\", \"VLDL\", \"TGLX\", \"TRIGL\"  No results found for: \"VITD3\", \"VD3RIA\"    Lab Results   Component Value Date/Time    TSH 0.689 11/14/2019 10:23 AM

## 2023-09-26 NOTE — TELEPHONE ENCOUNTER
Milan Muñoz called, states we just received fax for respiratory therapy and we do not supply durable medical supplies.      [facility]# [phone]

## 2023-09-26 NOTE — TELEPHONE ENCOUNTER
Called patient. Two patient identifiers verified. [facility] said [facility] not [facility] so she would like to  the nebulizer prescription so she can take it somewhere else. Prescription placed up front for pt. Pt also requested a refill of clindamycin gel be sent to [address] [place] [address]. Informed pt NP Leoncio Duobse was out of office this week but her refill request would be sent to another provider. ----- Message from Agustina Last sent at 9/25/2023 10:21 AM EDT -----  Subject: Message to Provider    QUESTIONS  Information for Provider? Patient would like for someone to call her about   the prescription that was sent back to your office for her breathing   machine. She wants to know if she should just come and  the   prescription or if it can be sent to another pharmacy. She wants to know   if she could have it sent to [address] [place] [address] on [address].   [phone] is their phone number. ---------------------------------------------------------------------------  --------------  Lauren FREDERICK  [phone]; OK to leave message on voicemail  ---------------------------------------------------------------------------  --------------  SCRIPT ANSWERS  Relationship to Patient?  Self

## 2023-09-26 NOTE — TELEPHONE ENCOUNTER
Already spoke to pt and she will be picking up print rx from office so she can take it to another pharmacy.

## 2023-09-27 NOTE — TELEPHONE ENCOUNTER
Pt came to office 09.27.23 to  written RX orders. Pt received order for nebulizer. Pt states she requested a cream RX as well. No other RX were found.  Pt would like a call back    Pt number is    [phone]

## 2023-09-28 RX ORDER — CLINDAMYCIN PHOSPHATE AND BENZOYL PEROXIDE 10; 50 MG/G; MG/G
GEL TOPICAL
Status: CANCELLED | OUTPATIENT
Start: 2023-09-28

## 2023-09-28 NOTE — TELEPHONE ENCOUNTER
PCP: YAMILETH Zhao NP    Last appt: 8/24/2022   Future Appointments   Date Time Provider [facility] [address]   10/27/2023  1:00 PM YAMILETH Brown NP PA[facility] BS AMB       Requested Prescriptions     Pending Prescriptions Disp Refills    Clindamycin-Benzoyl Per, Refr, 1.2-5 % GEL

## 2023-09-28 NOTE — TELEPHONE ENCOUNTER
Called patient. Two patient identifiers verified. Pt said pharmacy did not receive rx for clindamycin gel. Informed pt refill request was sent to another provider as NP Savannah Brooke is out of office but was denied by them, not sure why. Informed pt a request could be sent to NP Tra to put in rx once she is back in the office. Pt is aware she is out until 10/4. Pt also said she went to 4 pharmacies and none of them had nebulizers. Pt said the last time she got one the office had ordered it for her and she just picked it up from here, wanted to know if she could have this done again. Informed pt there was a way we could order DME so I would have to check and see if we could get her this that way or if there was something else we could do. Informed her I would look into it and give her a call back. Pt verbalized understanding.

## 2023-09-29 NOTE — TELEPHONE ENCOUNTER
Attempted to place order for nebulizer on [place] but was unable to as pt has not had in person visit in over a year. Attempted to call pt. Left voicemail with this information and let her know the order could be placed after her 10/27/23 appt if she wanted to wait until then. Also let her know she could try [facility] as they might be able to fill the nebulizer script or she could purchase one OTC from [facility].

## 2023-10-04 RX ORDER — CLINDAMYCIN PHOSPHATE AND BENZOYL PEROXIDE 10; 50 MG/G; MG/G
GEL TOPICAL
Qty: 45 G | Refills: 3 | Status: SHIPPED | OUTPATIENT
Start: 2023-10-04

## 2023-10-27 ENCOUNTER — TELEPHONE (OUTPATIENT)
Age: 38
End: 2023-10-27

## 2023-10-27 NOTE — TELEPHONE ENCOUNTER
[address] office to R/S PRAMOD Dutta's 10.27.23 appt due to provider out of office on 10.27.23/raina

## 2023-11-20 ENCOUNTER — TELEPHONE (OUTPATIENT)
Age: 38
End: 2023-11-20

## 2023-11-20 ENCOUNTER — OFFICE VISIT (OUTPATIENT)
Age: 38
End: 2023-11-20
Payer: COMMERCIAL

## 2023-11-20 VITALS
DIASTOLIC BLOOD PRESSURE: 69 MMHG | TEMPERATURE: 97.9 F | HEART RATE: 83 BPM | OXYGEN SATURATION: 95 % | BODY MASS INDEX: 23.79 KG/M2 | SYSTOLIC BLOOD PRESSURE: 103 MMHG | WEIGHT: 142.8 LBS | HEIGHT: 65 IN | RESPIRATION RATE: 16 BRPM

## 2023-11-20 DIAGNOSIS — I73.00 RAYNAUD'S SYNDROME WITHOUT GANGRENE: ICD-10-CM

## 2023-11-20 DIAGNOSIS — Z01.84 IMMUNITY STATUS TESTING: ICD-10-CM

## 2023-11-20 DIAGNOSIS — Z11.59 NEED FOR HEPATITIS B SCREENING TEST: ICD-10-CM

## 2023-11-20 DIAGNOSIS — M05.80 POLYARTHRITIS WITH POSITIVE RHEUMATOID FACTOR (HCC): Primary | ICD-10-CM

## 2023-11-20 DIAGNOSIS — J30.89 NON-SEASONAL ALLERGIC RHINITIS, UNSPECIFIED TRIGGER: ICD-10-CM

## 2023-11-20 LAB
BASOPHILS # BLD: 0.1 K/UL (ref 0–0.1)
BASOPHILS NFR BLD: 1 % (ref 0–1)
DIFFERENTIAL METHOD BLD: ABNORMAL
EOSINOPHIL # BLD: 1.1 K/UL (ref 0–0.4)
EOSINOPHIL NFR BLD: 16 % (ref 0–7)
ERYTHROCYTE [DISTWIDTH] IN BLOOD BY AUTOMATED COUNT: 12.1 % (ref 11.5–14.5)
HCT VFR BLD AUTO: 38.1 % (ref 35–47)
HGB BLD-MCNC: 12.4 G/DL (ref 11.5–16)
IMM GRANULOCYTES # BLD AUTO: 0 K/UL (ref 0–0.04)
IMM GRANULOCYTES NFR BLD AUTO: 0 % (ref 0–0.5)
LYMPHOCYTES # BLD: 2.1 K/UL (ref 0.8–3.5)
LYMPHOCYTES NFR BLD: 30 % (ref 12–49)
MCH RBC QN AUTO: 30.5 PG (ref 26–34)
MCHC RBC AUTO-ENTMCNC: 32.5 G/DL (ref 30–36.5)
MCV RBC AUTO: 93.8 FL (ref 80–99)
MONOCYTES # BLD: 0.6 K/UL (ref 0–1)
MONOCYTES NFR BLD: 8 % (ref 5–13)
NEUTS SEG # BLD: 3.2 K/UL (ref 1.8–8)
NEUTS SEG NFR BLD: 45 % (ref 32–75)
NRBC # BLD: 0 K/UL (ref 0–0.01)
NRBC BLD-RTO: 0 PER 100 WBC
PLATELET # BLD AUTO: 319 K/UL (ref 150–400)
PMV BLD AUTO: 10.6 FL (ref 8.9–12.9)
RBC # BLD AUTO: 4.06 M/UL (ref 3.8–5.2)
RBC MORPH BLD: ABNORMAL
WBC # BLD AUTO: 7.1 K/UL (ref 3.6–11)

## 2023-11-20 PROCEDURE — 99214 OFFICE O/P EST MOD 30 MIN: CPT | Performed by: NURSE PRACTITIONER

## 2023-11-20 RX ORDER — FEXOFENADINE HCL 180 MG/1
180 TABLET ORAL DAILY
Qty: 90 TABLET | Refills: 1 | Status: SHIPPED | OUTPATIENT
Start: 2023-11-20 | End: 2023-12-20

## 2023-11-20 RX ORDER — FLUTICASONE PROPIONATE 50 MCG
2 SPRAY, SUSPENSION (ML) NASAL DAILY
Qty: 16 G | Refills: 2 | Status: SHIPPED | OUTPATIENT
Start: 2023-11-20

## 2023-11-20 RX ORDER — NIFEDIPINE 30 MG/1
30 TABLET, FILM COATED, EXTENDED RELEASE ORAL DAILY
Qty: 30 TABLET | Refills: 1 | Status: SHIPPED | OUTPATIENT
Start: 2023-11-20

## 2023-11-20 NOTE — PROGRESS NOTES
[facility] Note     [address] Wolf (: 1985) is a 45 y.o. female, established patient, here for evaluation of the following chief complaint(s):  Follow-up Chronic Condition, Allergies, and Asthma (Needs new nebulizer)       ASSESSMENT/PLAN:  1. Polyarthritis with positive rheumatoid factor (HCC)  -     Amb External Referral To Rheumatology to establish care  -     Comprehensive Metabolic Panel; Future  -     CBC with Auto Differential; Future  - Anti-CCP positive: yes (50.4) in  / Positive DCIK  -Had been prescribed Humira but never started. 2. Raynaud's syndrome without gangrene  -     Amb External Referral To Rheumatology  - Did not previously trial nifedipine. Wishes to try now. - START    NIFEdipine (ADALAT CC) 30 MG extended release tablet; Take 1 tablet by mouth daily, Disp-30 tablet, R-1Normal    3. Non-seasonal allergic rhinitis, unspecified trigger  -   START  fexofenadine (ALLEGRA) 180 MG tablet; Take 1 tablet by mouth daily, Disp-90 tablet, R-1Normal  - START    fluticasone (FLONASE) 50 MCG/ACT nasal spray; 2 sprays by Each Nostril route daily, Disp-16 g, R-2Normal  - Consider allergy testing    4. Need for hepatitis B screening test  -     Hepatitis B Core Antibody, Total; Future  -     Hepatitis B Surface Antibody; Future  -     Hepatitis B Surface Antigen; Future    5. Immunity status testing  -     Varicella Zoster Antibody, IgG; Future        Return in about 1 year (around 2024), or if symptoms worsen or fail to improve. SUBJECTIVE/OBJECTIVE:    Christi Wolf is a 45 y.o. female seen today for RA, reynauds, allergies    Allergies:  Distant history of seasonal allergies reports HA, nasal congestion, sneezing and wheezing over the last month. Using more of her inhaler & taking benedryl nightly. RA:   Has been followed by Rheumatology Dr. Shah Heads. Has not reestablished care w/ Rheum to date. Wishes to pursue.   Wearing gloves when out in

## 2023-11-20 NOTE — TELEPHONE ENCOUNTER
Pt was calling to update provider per insurance company to order breathing machine it can be with any provider that's under DME airway breathing machine. Best call back number [phone]

## 2023-11-21 LAB
ALBUMIN SERPL-MCNC: 4 G/DL (ref 3.5–5)
ALBUMIN/GLOB SERPL: 1.1 (ref 1.1–2.2)
ALP SERPL-CCNC: 63 U/L (ref 45–117)
ALT SERPL-CCNC: 12 U/L (ref 12–78)
ANION GAP SERPL CALC-SCNC: 5 MMOL/L (ref 5–15)
AST SERPL-CCNC: 8 U/L (ref 15–37)
BILIRUB SERPL-MCNC: 0.4 MG/DL (ref 0.2–1)
BUN SERPL-MCNC: 9 MG/DL (ref 6–20)
BUN/CREAT SERPL: 11 (ref 12–20)
CALCIUM SERPL-MCNC: 9 MG/DL (ref 8.5–10.1)
CHLORIDE SERPL-SCNC: 106 MMOL/L (ref 97–108)
CO2 SERPL-SCNC: 27 MMOL/L (ref 21–32)
CREAT SERPL-MCNC: 0.83 MG/DL (ref 0.55–1.02)
GLOBULIN SER CALC-MCNC: 3.7 G/DL (ref 2–4)
GLUCOSE SERPL-MCNC: 89 MG/DL (ref 65–100)
HBV SURFACE AB SER QL: NONREACTIVE
HBV SURFACE AB SER-ACNC: 9.48 MIU/ML
HBV SURFACE AG SER QL: <0.1 INDEX
HBV SURFACE AG SER QL: NEGATIVE
POTASSIUM SERPL-SCNC: 4 MMOL/L (ref 3.5–5.1)
PROT SERPL-MCNC: 7.7 G/DL (ref 6.4–8.2)
SODIUM SERPL-SCNC: 138 MMOL/L (ref 136–145)

## 2023-11-22 ENCOUNTER — TELEPHONE (OUTPATIENT)
Age: 38
End: 2023-11-22

## 2023-11-22 LAB
HBV CORE AB SERPL QL IA: NEGATIVE
VZV IGG SER IA-ACNC: 613 INDEX

## 2023-11-22 NOTE — TELEPHONE ENCOUNTER
Attempted to call patient. Left voicemail to call the office back at [phone] to let us know if she wanted a mouthpiece or mask for her nebulizer and if she wanted it to be delivered to her home or the office.

## 2023-11-22 NOTE — TELEPHONE ENCOUNTER
Pt called said the pharmacist told her Meds need PA. Pt requested callback.     [facility]# [phone]     Clindamycin-Benzoyl Per, Refr, 1.2-5 % GEL    fexofenadine (ALLEGRA) 180 MG tablet

## 2023-11-22 NOTE — TELEPHONE ENCOUNTER
Pt returned call. States I would like the entire facemask and the nebulizer deliver to the office.

## 2023-11-24 NOTE — TELEPHONE ENCOUNTER
Allegra PA already completed and approved.  [facility]: [id number]

## 2023-11-27 NOTE — TELEPHONE ENCOUNTER
Attempted to call patient. Left voicemail that both prior authorizations were approved and if she had any issues let us know which pharmacy she was filling the gel at so the approval notice could be faxed to them. Bactrim Counseling:  I discussed with the patient the risks of sulfa antibiotics including but not limited to GI upset, allergic reaction, drug rash, diarrhea, dizziness, photosensitivity, and yeast infections.  Rarely, more serious reactions can occur including but not limited to aplastic anemia, agranulocytosis, methemoglobinemia, blood dyscrasias, liver or kidney failure, lung infiltrates or desquamative/blistering drug rashes.

## 2023-12-01 ENCOUNTER — TELEPHONE (OUTPATIENT)
Age: 38
End: 2023-12-01

## 2023-12-01 NOTE — TELEPHONE ENCOUNTER
Attempted to call patient. Left voicemail that nebulizer had come in to office and she can come by to pick it up.

## 2023-12-28 RX ORDER — NAPROXEN 500 MG/1
500 TABLET ORAL 2 TIMES DAILY WITH MEALS
Qty: 60 TABLET | Refills: 3 | Status: SHIPPED | OUTPATIENT
Start: 2023-12-28

## 2023-12-28 NOTE — TELEPHONE ENCOUNTER
Lov 11/20/2023      Message  Received: 2 days ago  Zora Ramos Bimbasket  Clinical Staff  Subject: Refill Request    QUESTIONS  Name of Medication? naproxen (NAPROSYN) 500 MG tablet  Patient-reported dosage and instructions? 1 a day  How many days do you have left? 0  [id number]  Pharmacy phone number (if available)? [phone]  ---------------------------------------------------------------------------  --------------,  Name of Medication? albuterol sulfate HFA (PROVENTIL;VENTOLIN;PROAIR) 108  (90 Base) MCG/ACT inhaler  Patient-reported dosage and instructions? INHAILER  How many days do you have left? 0  Preferred Pharmacy? [facility] #[id number]  Pharmacy phone number (if available)? [phone]  ---------------------------------------------------------------------------  --------------  Zainab PRATT  What is the best way for the office to contact you? OK to leave message on  voicemail  Preferred Call Back Phone Number? [phone]  ---------------------------------------------------------------------------  --------------  SCRIPT ANSWERS  Relationship to Patient?  Self

## 2024-01-23 RX ORDER — ALBUTEROL SULFATE 2.5 MG/3ML
SOLUTION RESPIRATORY (INHALATION)
Qty: 540 ML | Refills: 11 | Status: SHIPPED | OUTPATIENT
Start: 2024-01-23

## 2024-01-23 NOTE — TELEPHONE ENCOUNTER
PCP: Estephanie Dutta, APRN - NP    Last appt: 11/20/2023   No future appointments.    Requested Prescriptions     Pending Prescriptions Disp Refills    albuterol (PROVENTIL) (2.5 MG/3ML) 0.083% nebulizer solution [Pharmacy Med Name: ALBUTEROL 2.5MG/3ML (2.5 MG/3ML Nebulization Solution] 540 mL 11     Sig: NEBULIZE 1 VIAL EVERY 4 HOURS AS NEEDED FOR SHORTNESS OF BREATH/ WHEEZING         Prior labs and Blood pressures:  BP Readings from Last 3 Encounters:   11/20/23 103/69   08/24/22 101/65   04/01/22 96/62     Lab Results   Component Value Date/Time     11/20/2023 11:59 AM    K 4.0 11/20/2023 11:59 AM     11/20/2023 11:59 AM    CO2 27 11/20/2023 11:59 AM    BUN 9 11/20/2023 11:59 AM    GFRAA >60 04/01/2022 04:31 PM     No results found for: \"HBA1C\", \"IMI9NAYA\"  No results found for: \"CHOL\", \"CHOLPOCT\", \"CHOLX\", \"CHLST\", \"CHOLV\", \"HDL\", \"HDLPOC\", \"HDLC\", \"LDL\", \"LDLC\", \"VLDLC\", \"VLDL\", \"TGLX\", \"TRIGL\"  No results found for: \"VITD3\", \"VD3RIA\"    Lab Results   Component Value Date/Time    TSH 0.689 11/14/2019 10:23 AM

## 2024-01-31 RX ORDER — INDOMETHACIN 25 MG/1
25 CAPSULE ORAL 3 TIMES DAILY PRN
Qty: 10 CAPSULE | Refills: 0 | Status: SHIPPED | OUTPATIENT
Start: 2024-01-31

## 2024-01-31 NOTE — TELEPHONE ENCOUNTER
[facility] is requesting refill of indomethacin 25mg caps for patient for gout pain.    Re-entered as previous rx 4/5/22 #10 x 3 days if appropriate.  Thanks, Gabriela    Last appointment: 11/250/23 Tra  Next appointment: None  Previous refill encounter(s): 4/5/22 #10    Requested Prescriptions     Pending Prescriptions Disp Refills    indomethacin (INDOCIN) 25 MG capsule 10 capsule 0     Sig: Take 1 capsule by mouth 3 times daily as needed for Pain (gout pain) For up to 3 days     For Pharmacy Admin Tracking Only    Program: Medication Refill  CPA in place:    Recommendation Provided To:   Intervention Detail: New Rx: 1, reason: Patient Preference  Intervention Accepted By:   Gap Closed?:    Time Spent (min): 5

## 2024-02-27 DIAGNOSIS — K21.9 GASTRO-ESOPHAGEAL REFLUX DISEASE WITHOUT ESOPHAGITIS: ICD-10-CM

## 2024-02-27 RX ORDER — ALBUTEROL SULFATE 90 UG/1
AEROSOL, METERED RESPIRATORY (INHALATION)
Qty: 18 EACH | Refills: 1 | Status: SHIPPED | OUTPATIENT
Start: 2024-02-27

## 2024-02-27 RX ORDER — FAMOTIDINE 40 MG/1
40 TABLET, FILM COATED ORAL DAILY
Qty: 30 TABLET | Refills: 0 | Status: SHIPPED | OUTPATIENT
Start: 2024-02-27

## 2024-02-27 NOTE — TELEPHONE ENCOUNTER
PCP: Estephanie Dutta, APRN - NP    Last appt: 11/20/2023   No future appointments.    Requested Prescriptions     Pending Prescriptions Disp Refills    famotidine (PEPCID) 40 MG tablet [Pharmacy Med Name: FAMOTIDINE 40 MG TABLET] 30 tablet 0     Sig: TAKE 1 TABLET BY MOUTH EVERY DAY    albuterol sulfate HFA (PROVENTIL;VENTOLIN;PROAIR) 108 (90 Base) MCG/ACT inhaler [Pharmacy Med Name: ALBUTEROL HFA (VENTOLIN) INH] 18 each      Sig: INHALE 2 PUFFS BY MOUTH EVERY 4 HOURS AS NEEDED FOR WHEEZE OR FOR SHORTNESS OF BREATH         Prior labs and Blood pressures:  BP Readings from Last 3 Encounters:   11/20/23 103/69   08/24/22 101/65   04/01/22 96/62     Lab Results   Component Value Date/Time     11/20/2023 11:59 AM    K 4.0 11/20/2023 11:59 AM     11/20/2023 11:59 AM    CO2 27 11/20/2023 11:59 AM    BUN 9 11/20/2023 11:59 AM    GFRAA >60 04/01/2022 04:31 PM     No results found for: \"HBA1C\", \"EOZ3PIIP\"  No results found for: \"CHOL\", \"CHOLPOCT\", \"CHOLX\", \"CHLST\", \"CHOLV\", \"HDL\", \"HDLPOC\", \"HDLC\", \"LDL\", \"LDLC\", \"VLDLC\", \"VLDL\", \"TGLX\", \"TRIGL\"  No results found for: \"VITD3\", \"VD3RIA\"    Lab Results   Component Value Date/Time    TSH 0.689 11/14/2019 10:23 AM

## 2024-04-15 DIAGNOSIS — K21.9 GASTRO-ESOPHAGEAL REFLUX DISEASE WITHOUT ESOPHAGITIS: ICD-10-CM

## 2024-04-15 RX ORDER — FAMOTIDINE 40 MG/1
40 TABLET, FILM COATED ORAL DAILY
Qty: 30 TABLET | Refills: 0 | Status: SHIPPED | OUTPATIENT
Start: 2024-04-15

## 2024-04-15 NOTE — TELEPHONE ENCOUNTER
PCP: Estephanie Dutta, APRN - NP    Last appt: 11/20/2023   No future appointments.    Requested Prescriptions     Pending Prescriptions Disp Refills    famotidine (PEPCID) 40 MG tablet [Pharmacy Med Name: FAMOTIDINE 40 MG TABLET] 30 tablet 0     Sig: TAKE 1 TABLET BY MOUTH EVERY DAY         Prior labs and Blood pressures:  BP Readings from Last 3 Encounters:   11/20/23 103/69   08/24/22 101/65   04/01/22 96/62     Lab Results   Component Value Date/Time     11/20/2023 11:59 AM    K 4.0 11/20/2023 11:59 AM     11/20/2023 11:59 AM    CO2 27 11/20/2023 11:59 AM    BUN 9 11/20/2023 11:59 AM    GFRAA >60 04/01/2022 04:31 PM     No results found for: \"HBA1C\", \"CHU2NTXP\"  No results found for: \"CHOL\", \"CHOLPOCT\", \"CHOLX\", \"CHLST\", \"CHOLV\", \"HDL\", \"HDLPOC\", \"HDLC\", \"LDL\", \"LDLC\", \"VLDLC\", \"VLDL\", \"TGLX\", \"TRIGL\"  No results found for: \"VITD3\", \"VD3RIA\"    Lab Results   Component Value Date/Time    TSH 0.689 11/14/2019 10:23 AM

## 2024-04-15 NOTE — TELEPHONE ENCOUNTER
Pt came into office and stated she is out famotidine and needs a refill asap. Pt said she takes it for acid reflux and everything that she has eaten/drink for the past 24 hrs has been giving her acid reflux even water. Pt would like a call if this can be refilled. LOV 11/20/23 with PRAMOD Dutta. Pt is requesting a 3 month supply. Best callback number [phone].-TM 4/15/24

## 2024-06-05 DIAGNOSIS — K21.9 GASTRO-ESOPHAGEAL REFLUX DISEASE WITHOUT ESOPHAGITIS: ICD-10-CM

## 2024-06-05 RX ORDER — FAMOTIDINE 40 MG/1
40 TABLET, FILM COATED ORAL DAILY
Qty: 30 TABLET | Refills: 0 | Status: SHIPPED | OUTPATIENT
Start: 2024-06-05

## 2024-06-05 NOTE — TELEPHONE ENCOUNTER
PCP: Estephanie Dutta, APRN - NP    Last appt: 11/20/2023   No future appointments.    Requested Prescriptions     Pending Prescriptions Disp Refills    famotidine (PEPCID) 40 MG tablet [Pharmacy Med Name: FAMOTIDINE 40 MG TABLET] 30 tablet 0     Sig: TAKE 1 TABLET BY MOUTH EVERY DAY         Prior labs and Blood pressures:  BP Readings from Last 3 Encounters:   11/20/23 103/69   08/24/22 101/65   04/01/22 96/62     Lab Results   Component Value Date/Time     11/20/2023 11:59 AM    K 4.0 11/20/2023 11:59 AM     11/20/2023 11:59 AM    CO2 27 11/20/2023 11:59 AM    BUN 9 11/20/2023 11:59 AM    GFRAA >60 04/01/2022 04:31 PM     No results found for: \"HBA1C\", \"YLP4RPTJ\"  No results found for: \"CHOL\", \"CHOLPOCT\", \"CHLST\", \"CHOLV\", \"HDL\", \"HDLPOC\", \"HDLC\", \"LDL\", \"VLDLC\", \"VLDL\"  No results found for: \"VITD3\"    Lab Results   Component Value Date/Time    TSH 0.689 11/14/2019 10:23 AM

## 2024-09-10 DIAGNOSIS — K21.9 GASTRO-ESOPHAGEAL REFLUX DISEASE WITHOUT ESOPHAGITIS: ICD-10-CM

## 2024-09-12 RX ORDER — FAMOTIDINE 40 MG/1
40 TABLET, FILM COATED ORAL 2 TIMES DAILY
Qty: 60 TABLET | Refills: 0 | Status: SHIPPED | OUTPATIENT
Start: 2024-09-12

## 2024-10-01 NOTE — TELEPHONE ENCOUNTER
PCP: Estephanie Dutta, APRN - NP    Last appt: 11/20/2023   No future appointments.    Requested Prescriptions     Pending Prescriptions Disp Refills    albuterol sulfate HFA (PROVENTIL;VENTOLIN;PROAIR) 108 (90 Base) MCG/ACT inhaler [Pharmacy Med Name: ALBUTEROL HFA (PROAIR) INHALER] 8.5 each 1     Sig: INHALE 2 PUFFS BY MOUTH EVERY 4 HOURS AS NEEDED FOR WHEEZE OR FOR SHORTNESS OF BREATH         Prior labs and Blood pressures:  BP Readings from Last 3 Encounters:   11/20/23 103/69   08/24/22 101/65   04/01/22 96/62     Lab Results   Component Value Date/Time     11/20/2023 11:59 AM    K 4.0 11/20/2023 11:59 AM     11/20/2023 11:59 AM    CO2 27 11/20/2023 11:59 AM    BUN 9 11/20/2023 11:59 AM    GFRAA >60 04/01/2022 04:31 PM     No results found for: \"HBA1C\", \"NRU3QHBV\"  No results found for: \"CHOL\", \"CHOLPOCT\", \"CHLST\", \"CHOLV\", \"HDL\", \"HDLPOC\", \"HDLC\", \"LDL\", \"LDLC\", \"VLDLC\", \"VLDL\"  No results found for: \"VITD3\"    Lab Results   Component Value Date/Time    TSH 0.689 11/14/2019 10:23 AM

## 2024-10-01 NOTE — TELEPHONE ENCOUNTER
Left pt a message to callback the office.Please inform the pt that we had received a refill request for her Albuterol Sulfate Inhaler from  her pharmacy.However PRAMOD Dutta is holding off on the refill due to the fact you would need a office visit before the medication can be filled.

## 2024-10-03 NOTE — TELEPHONE ENCOUNTER
Pt's been scheduled to see PRAMOD Dutta on 11/1/24 @ 1:45 PM.Pt did state that she will need a temporary refill on he Albuterol Inhaler.

## 2024-10-04 RX ORDER — ALBUTEROL SULFATE 90 UG/1
INHALANT RESPIRATORY (INHALATION)
Qty: 8.5 EACH | Refills: 0 | Status: SHIPPED | OUTPATIENT
Start: 2024-10-04

## 2024-12-30 ENCOUNTER — OFFICE VISIT (OUTPATIENT)
Age: 39
End: 2024-12-30
Payer: COMMERCIAL

## 2024-12-30 VITALS
WEIGHT: 147 LBS | SYSTOLIC BLOOD PRESSURE: 90 MMHG | DIASTOLIC BLOOD PRESSURE: 60 MMHG | RESPIRATION RATE: 18 BRPM | HEIGHT: 65 IN | BODY MASS INDEX: 24.49 KG/M2

## 2024-12-30 DIAGNOSIS — M79.641 PAIN IN BOTH HANDS: Primary | ICD-10-CM

## 2024-12-30 DIAGNOSIS — G89.29 CHRONIC PAIN OF BOTH KNEES: ICD-10-CM

## 2024-12-30 DIAGNOSIS — M25.561 CHRONIC PAIN OF BOTH KNEES: ICD-10-CM

## 2024-12-30 DIAGNOSIS — M25.562 CHRONIC PAIN OF BOTH KNEES: ICD-10-CM

## 2024-12-30 DIAGNOSIS — E55.9 VITAMIN D DEFICIENCY: ICD-10-CM

## 2024-12-30 DIAGNOSIS — R76.8 POSITIVE ANA (ANTINUCLEAR ANTIBODY): ICD-10-CM

## 2024-12-30 DIAGNOSIS — Z80.3 FAMILY HISTORY OF BREAST CANCER IN MOTHER: ICD-10-CM

## 2024-12-30 DIAGNOSIS — R76.8 ELEVATED RHEUMATOID FACTOR: ICD-10-CM

## 2024-12-30 DIAGNOSIS — M79.642 PAIN IN BOTH HANDS: ICD-10-CM

## 2024-12-30 DIAGNOSIS — Z13.220 LIPID SCREENING: ICD-10-CM

## 2024-12-30 DIAGNOSIS — M79.641 PAIN IN BOTH HANDS: ICD-10-CM

## 2024-12-30 DIAGNOSIS — M79.642 PAIN IN BOTH HANDS: Primary | ICD-10-CM

## 2024-12-30 DIAGNOSIS — I73.00 RAYNAUD'S DISEASE WITHOUT GANGRENE: ICD-10-CM

## 2024-12-30 DIAGNOSIS — Z12.31 BREAST CANCER SCREENING BY MAMMOGRAM: ICD-10-CM

## 2024-12-30 DIAGNOSIS — K21.9 GASTRO-ESOPHAGEAL REFLUX DISEASE WITHOUT ESOPHAGITIS: ICD-10-CM

## 2024-12-30 PROCEDURE — 99214 OFFICE O/P EST MOD 30 MIN: CPT | Performed by: INTERNAL MEDICINE

## 2024-12-30 RX ORDER — FAMOTIDINE 40 MG/1
40 TABLET, FILM COATED ORAL 2 TIMES DAILY
Qty: 120 TABLET | Refills: 1 | Status: SHIPPED | OUTPATIENT
Start: 2024-12-30

## 2024-12-30 RX ORDER — CLINDAMYCIN PHOSPHATE AND BENZOYL PEROXIDE 10; 50 MG/G; MG/G
GEL TOPICAL
Qty: 45 G | Refills: 3 | Status: SHIPPED | OUTPATIENT
Start: 2024-12-30

## 2024-12-30 RX ORDER — ALBUTEROL SULFATE 90 UG/1
2 INHALANT RESPIRATORY (INHALATION) 4 TIMES DAILY
Qty: 18 G | Refills: 2 | Status: SHIPPED | OUTPATIENT
Start: 2024-12-30

## 2024-12-30 RX ORDER — NAPROXEN 500 MG/1
500 TABLET ORAL 2 TIMES DAILY WITH MEALS
Qty: 60 TABLET | Refills: 1 | Status: SHIPPED | OUTPATIENT
Start: 2024-12-30

## 2024-12-30 SDOH — ECONOMIC STABILITY: FOOD INSECURITY: WITHIN THE PAST 12 MONTHS, THE FOOD YOU BOUGHT JUST DIDN'T LAST AND YOU DIDN'T HAVE MONEY TO GET MORE.: NEVER TRUE

## 2024-12-30 SDOH — ECONOMIC STABILITY: FOOD INSECURITY: WITHIN THE PAST 12 MONTHS, YOU WORRIED THAT YOUR FOOD WOULD RUN OUT BEFORE YOU GOT MONEY TO BUY MORE.: NEVER TRUE

## 2024-12-30 SDOH — ECONOMIC STABILITY: INCOME INSECURITY: HOW HARD IS IT FOR YOU TO PAY FOR THE VERY BASICS LIKE FOOD, HOUSING, MEDICAL CARE, AND HEATING?: NOT HARD AT ALL

## 2024-12-30 ASSESSMENT — PATIENT HEALTH QUESTIONNAIRE - PHQ9
SUM OF ALL RESPONSES TO PHQ QUESTIONS 1-9: 0
SUM OF ALL RESPONSES TO PHQ9 QUESTIONS 1 & 2: 0
SUM OF ALL RESPONSES TO PHQ QUESTIONS 1-9: 0
2. FEELING DOWN, DEPRESSED OR HOPELESS: NOT AT ALL
SUM OF ALL RESPONSES TO PHQ QUESTIONS 1-9: 0
1. LITTLE INTEREST OR PLEASURE IN DOING THINGS: NOT AT ALL
SUM OF ALL RESPONSES TO PHQ QUESTIONS 1-9: 0

## 2024-12-30 ASSESSMENT — ENCOUNTER SYMPTOMS
GASTROINTESTINAL NEGATIVE: 1
RESPIRATORY NEGATIVE: 1

## 2024-12-30 NOTE — TELEPHONE ENCOUNTER
[facility]/pharmacy #[id number] - [place], [place] - [address] -  [phone] -  [phone]     albuterol sulfate HFA (PROVENTIL;VENTOLIN;PROAIR) 108 (90 Base) MCG/ACT inhaler       Clindamycin-Benzoyl Per, Refr, 1.2-5 % GEL     12/30/2024  No upcoming

## 2024-12-30 NOTE — PROGRESS NOTES
Chief Complaint   Patient presents with    Establish Care     \"Have you been to the ER, urgent care clinic since your last visit?  Hospitalized since your last visit?\"    NO    “Have you seen or consulted any other health care providers outside our system since your last visit?”    New Patient             
Refill    albuterol (PROVENTIL) (2.5 MG/3ML) 0.083% nebulizer solution NEBULIZE 1 VIAL EVERY 4 HOURS AS NEEDED FOR SHORTNESS OF BREATH/ WHEEZING 540 mL 11    fluticasone (FLONASE) 50 MCG/ACT nasal spray 2 sprays by Each Nostril route daily 16 g 2    EPINEPHrine (EPIPEN 2-BETTY) 0.3 MG/0.3ML SOAJ injection Inject 0.3 mLs into the muscle once for 1 dose Use as directed for allergic reaction 0.3 mL 1    ibuprofen (ADVIL;MOTRIN) 200 MG tablet Take 2 tablets by mouth daily as needed       No current facility-administered medications on file prior to visit.           Review of Systems   Constitutional: Negative.    Respiratory: Negative.     Cardiovascular: Negative.    Gastrointestinal: Negative.    Musculoskeletal:  Positive for arthralgias. Negative for gait problem.   Psychiatric/Behavioral: Negative.           Objective    Physical Exam   Physical Exam  The patient is in no acute distress.  No thyromegaly or cervical lymphadenopathy in the neck.  S1, S2 heard with regular rate and rhythm in the heart.  Upper body strength is equal and rated at +5. Lower body strength is also +5. No pain upon palpation to shoulder joints, bilateral elbow joints or hands. No discoloration to the hands. Radial pulses are present.  Skin is warm.      Assessment & Plan    Assessment & Plan  1. Rheumatoid arthritis.  Her rheumatoid factor (RF) was elevated at 50 in 2020 and 53 in 2019, indicating a potential autoimmune condition. Symptoms include joint pain, swelling, and discoloration of the hands. The discoloration may be due to Raynaud's phenomenon, which is managed symptomatically. Updated blood work is necessary to assess her current condition. A referral to [facility] will be made. She is advised to continue taking famotidine while using NSAIDs like naproxen, Motrin, ibuprofen, and Aleve to mitigate potential side effects. She should also avoid daily use of BC powder.    2. Raynaud's phenomenon.  The

## 2025-01-04 LAB
25(OH)D3+25(OH)D2 SERPL-MCNC: 33.8 NG/ML (ref 30–100)
ALBUMIN SERPL-MCNC: 4.3 G/DL (ref 3.9–4.9)
ALP SERPL-CCNC: 61 IU/L (ref 44–121)
ALT SERPL-CCNC: 10 IU/L (ref 0–32)
AST SERPL-CCNC: 20 IU/L (ref 0–40)
BASOPHILS # BLD AUTO: 0.1 X10E3/UL (ref 0–0.2)
BASOPHILS NFR BLD AUTO: 1 %
BILIRUB SERPL-MCNC: 0.4 MG/DL (ref 0–1.2)
BUN SERPL-MCNC: 10 MG/DL (ref 6–20)
BUN/CREAT SERPL: 14 (ref 9–23)
CALCIUM SERPL-MCNC: 8.9 MG/DL (ref 8.7–10.2)
CENTROMERE B AB SER-ACNC: >8 AI (ref 0–0.9)
CHLORIDE SERPL-SCNC: 101 MMOL/L (ref 96–106)
CHOLEST SERPL-MCNC: 138 MG/DL (ref 100–199)
CHROMATIN AB SERPL-ACNC: 0.3 AI (ref 0–0.9)
CO2 SERPL-SCNC: 22 MMOL/L (ref 20–29)
CREAT SERPL-MCNC: 0.73 MG/DL (ref 0.57–1)
DSDNA AB SER-ACNC: <1 IU/ML (ref 0–9)
EGFRCR SERPLBLD CKD-EPI 2021: 107 ML/MIN/1.73
ENA JO1 AB SER-ACNC: <0.2 AI (ref 0–0.9)
ENA RNP AB SER-ACNC: <0.2 AI (ref 0–0.9)
ENA SCL70 AB SER-ACNC: <0.2 AI (ref 0–0.9)
ENA SM AB SER-ACNC: <0.2 AI (ref 0–0.9)
ENA SS-A AB SER-ACNC: <0.2 AI (ref 0–0.9)
ENA SS-B AB SER-ACNC: <0.2 AI (ref 0–0.9)
EOSINOPHIL # BLD AUTO: 0.4 X10E3/UL (ref 0–0.4)
EOSINOPHIL NFR BLD AUTO: 7 %
ERYTHROCYTE [DISTWIDTH] IN BLOOD BY AUTOMATED COUNT: 11.7 % (ref 11.7–15.4)
GLOBULIN SER CALC-MCNC: 2.7 G/DL (ref 1.5–4.5)
GLUCOSE SERPL-MCNC: 89 MG/DL (ref 70–99)
HCT VFR BLD AUTO: 38.5 % (ref 34–46.6)
HDLC SERPL-MCNC: 55 MG/DL
HGB BLD-MCNC: 13.1 G/DL (ref 11.1–15.9)
IMM GRANULOCYTES # BLD AUTO: 0 X10E3/UL (ref 0–0.1)
IMM GRANULOCYTES NFR BLD AUTO: 0 %
IMP & REVIEW OF LAB RESULTS: NORMAL
LDLC SERPL CALC-MCNC: 74 MG/DL (ref 0–99)
LYMPHOCYTES # BLD AUTO: 1.9 X10E3/UL (ref 0.7–3.1)
LYMPHOCYTES NFR BLD AUTO: 35 %
Lab: ABNORMAL
MCH RBC QN AUTO: 32.3 PG (ref 26.6–33)
MCHC RBC AUTO-ENTMCNC: 34 G/DL (ref 31.5–35.7)
MCV RBC AUTO: 95 FL (ref 79–97)
MONOCYTES # BLD AUTO: 0.6 X10E3/UL (ref 0.1–0.9)
MONOCYTES NFR BLD AUTO: 11 %
NEUTROPHILS # BLD AUTO: 2.4 X10E3/UL (ref 1.4–7)
NEUTROPHILS NFR BLD AUTO: 46 %
PLATELET # BLD AUTO: 270 X10E3/UL (ref 150–450)
POTASSIUM SERPL-SCNC: 4.4 MMOL/L (ref 3.5–5.2)
PROT SERPL-MCNC: 7 G/DL (ref 6–8.5)
RBC # BLD AUTO: 4.05 X10E6/UL (ref 3.77–5.28)
SODIUM SERPL-SCNC: 139 MMOL/L (ref 134–144)
TRIGL SERPL-MCNC: 36 MG/DL (ref 0–149)
TSH SERPL DL<=0.005 MIU/L-ACNC: 0.96 UIU/ML (ref 0.45–4.5)
VLDLC SERPL CALC-MCNC: 9 MG/DL (ref 5–40)
WBC # BLD AUTO: 5.3 X10E3/UL (ref 3.4–10.8)

## 2025-01-06 ENCOUNTER — TELEPHONE (OUTPATIENT)
Age: 40
End: 2025-01-06

## 2025-01-06 DIAGNOSIS — R76.8 POSITIVE ANA (ANTINUCLEAR ANTIBODY): Primary | ICD-10-CM

## 2025-01-06 DIAGNOSIS — G89.29 CHRONIC PAIN OF BOTH KNEES: ICD-10-CM

## 2025-01-06 DIAGNOSIS — M79.641 PAIN IN BOTH HANDS: ICD-10-CM

## 2025-01-06 DIAGNOSIS — M25.562 CHRONIC PAIN OF BOTH KNEES: ICD-10-CM

## 2025-01-06 DIAGNOSIS — M25.561 CHRONIC PAIN OF BOTH KNEES: ICD-10-CM

## 2025-01-06 DIAGNOSIS — M79.642 PAIN IN BOTH HANDS: ICD-10-CM

## 2025-01-06 NOTE — TELEPHONE ENCOUNTER
Michael Meeks was called and verbalized understanding on note below.     ----- Message from YAMILETH Victoria NP sent at 1/6/2025  8:08 AM EST -----  HUBERT is positive. Be sure patient has made an appt with rheumatology   
TTE on 6/23  Monitor radial site for swelling, bleeding  Continue ASA

## 2025-01-08 LAB
CCP IGA+IGG SERPL IA-ACNC: <20 UNITS
REFLEX INFORMATION: ABNORMAL
RHEUMATOID FACT SERPL-ACNC: 42 IU/ML

## 2025-01-13 RX ORDER — ALBUTEROL SULFATE 0.83 MG/ML
SOLUTION RESPIRATORY (INHALATION)
Qty: 540 ML | Refills: 11 | OUTPATIENT
Start: 2025-01-13

## 2025-01-22 RX ORDER — ALBUTEROL SULFATE 0.83 MG/ML
SOLUTION RESPIRATORY (INHALATION)
Qty: 540 ML | Refills: 11 | OUTPATIENT
Start: 2025-01-22

## 2025-02-18 ENCOUNTER — HOSPITAL ENCOUNTER (OUTPATIENT)
Facility: HOSPITAL | Age: 40
Discharge: HOME | End: 2025-02-21
Payer: COMMERCIAL

## 2025-02-18 VITALS
OXYGEN SATURATION: 99 % | HEIGHT: 65 IN | BODY MASS INDEX: 24.46 KG/M2 | HEART RATE: 59 BPM | WEIGHT: 146.8 LBS | DIASTOLIC BLOOD PRESSURE: 59 MMHG | TEMPERATURE: 98.1 F | SYSTOLIC BLOOD PRESSURE: 110 MMHG

## 2025-02-18 LAB
BASOPHILS # BLD: 0.05 K/UL (ref 0–0.1)
BASOPHILS NFR BLD: 0.8 % (ref 0–1)
DIFFERENTIAL METHOD BLD: ABNORMAL
EOSINOPHIL # BLD: 0.41 K/UL (ref 0–0.4)
EOSINOPHIL NFR BLD: 6.8 % (ref 0–7)
ERYTHROCYTE [DISTWIDTH] IN BLOOD BY AUTOMATED COUNT: 12.2 % (ref 11.5–14.5)
HCT VFR BLD AUTO: 38.5 % (ref 35–47)
HGB BLD-MCNC: 12.6 G/DL (ref 11.5–16)
IMM GRANULOCYTES # BLD AUTO: 0.02 K/UL (ref 0–0.04)
IMM GRANULOCYTES NFR BLD AUTO: 0.3 % (ref 0–0.5)
LYMPHOCYTES # BLD: 2.22 K/UL (ref 0.8–3.5)
LYMPHOCYTES NFR BLD: 36.6 % (ref 12–49)
MCH RBC QN AUTO: 31 PG (ref 26–34)
MCHC RBC AUTO-ENTMCNC: 32.7 G/DL (ref 30–36.5)
MCV RBC AUTO: 94.6 FL (ref 80–99)
MONOCYTES # BLD: 0.63 K/UL (ref 0–1)
MONOCYTES NFR BLD: 10.4 % (ref 5–13)
NEUTS SEG # BLD: 2.74 K/UL (ref 1.8–8)
NEUTS SEG NFR BLD: 45.1 % (ref 32–75)
NRBC # BLD: 0 K/UL (ref 0–0.01)
NRBC BLD-RTO: 0 PER 100 WBC
PLATELET # BLD AUTO: 262 K/UL (ref 150–400)
PMV BLD AUTO: 11.1 FL (ref 8.9–12.9)
RBC # BLD AUTO: 4.07 M/UL (ref 3.8–5.2)
WBC # BLD AUTO: 6.1 K/UL (ref 3.6–11)

## 2025-02-18 PROCEDURE — 36415 COLL VENOUS BLD VENIPUNCTURE: CPT

## 2025-02-18 PROCEDURE — 85025 COMPLETE CBC W/AUTO DIFF WBC: CPT

## 2025-02-18 RX ORDER — ACETAMINOPHEN 160 MG
2000 TABLET,DISINTEGRATING ORAL DAILY
COMMUNITY

## 2025-02-18 RX ORDER — GINSENG 100 MG
50 CAPSULE ORAL DAILY
COMMUNITY

## 2025-02-18 RX ORDER — CHLORAL HYDRATE 500 MG
CAPSULE ORAL DAILY
COMMUNITY

## 2025-02-18 RX ORDER — LANOLIN ALCOHOL/MO/W.PET/CERES
1000 CREAM (GRAM) TOPICAL DAILY
COMMUNITY

## 2025-02-18 NOTE — PERIOP NOTE
[address] [address]   MAIN OR                                  [phone]    MAIN PRE OP             [phone]                                                                                AMBULATORY PRE OP          [phone]  PRE-ADMISSION TESTING    [phone]     Surgery Date:  3/4/25       Is surgery arrival time given by surgeon?  NO    If “NO”, [facility] staff will call you between 4 and 7pm the day before your surgery with your arrival time. (If your surgery is on a Monday, we will call you the Friday before.)    Call [phone] after 7pm Monday-Friday if you did not receive this call.    INSTRUCTIONS BEFORE YOUR SURGERY   When You  Arrive Arrive at [facility] Patient Access on 1st floor the day of your surgery.   Medications to   TAKE   Morning of Surgery MEDICATIONS TO TAKE THE MORNING OF SURGERY WITH A SIP OF WATER: FAMOTIDINE    You may take these medications, IF NEEDED, the morning of surgery: ALBUTEROL INHALER  Ask your surgeon/prescribing doctor for instructions on taking or stopping these medications prior to surgery: NONE   Medications to STOP  before surgery Non-Steroidal anti-inflammatory Drugs (NSAID's): for example, Diclofenac (Voltaren), Ibuprofen (Advil, Motrin), Naproxen (Aleve) 3 days  STOP all herbal supplements and vitamins(unless prescribed by your doctor), and fish oil for 7 days  Other: NONE  (Pain medications not listed above, including Tylenol may be taken up until 4 hours prior to arrival time)   Blood  Thinners If you take Aspirin, Eliquis, Plavix, Coumadin, or any blood-thinning or anti-blood clot medicine, talk to the doctor who prescribed the medications for pre-operative instructions.  If you take aspirin or aspirin containing products for pain, stop 7 days prior to surgery   Going Home - or Spending the Night OUTPATIENT SURGERY: You must have a responsible adult drive you home and stay with you

## 2025-03-03 ENCOUNTER — ANESTHESIA EVENT (OUTPATIENT)
Facility: HOSPITAL | Age: 40
End: 2025-03-03
Payer: COMMERCIAL

## 2025-03-04 ENCOUNTER — ANESTHESIA (OUTPATIENT)
Facility: HOSPITAL | Age: 40
End: 2025-03-04
Payer: COMMERCIAL

## 2025-03-04 ENCOUNTER — HOSPITAL ENCOUNTER (OUTPATIENT)
Facility: HOSPITAL | Age: 40
Setting detail: OBSERVATION
Discharge: HOME | End: 2025-03-05
Attending: OBSTETRICS & GYNECOLOGY | Admitting: OBSTETRICS & GYNECOLOGY
Payer: COMMERCIAL

## 2025-03-04 DIAGNOSIS — Z98.890 S/P MYOMECTOMY: Primary | ICD-10-CM

## 2025-03-04 LAB — HCG UR QL: NEGATIVE

## 2025-03-04 PROCEDURE — 2720000010 HC SURG SUPPLY STERILE: Performed by: OBSTETRICS & GYNECOLOGY

## 2025-03-04 PROCEDURE — 6360000002 HC RX W HCPCS: Performed by: ANESTHESIOLOGY

## 2025-03-04 PROCEDURE — 3700000000 HC ANESTHESIA ATTENDED CARE: Performed by: OBSTETRICS & GYNECOLOGY

## 2025-03-04 PROCEDURE — 6360000002 HC RX W HCPCS: Performed by: NURSE ANESTHETIST, CERTIFIED REGISTERED

## 2025-03-04 PROCEDURE — 6370000000 HC RX 637 (ALT 250 FOR IP)

## 2025-03-04 PROCEDURE — 3600000009 HC SURGERY ROBOT BASE: Performed by: OBSTETRICS & GYNECOLOGY

## 2025-03-04 PROCEDURE — 2580000003 HC RX 258: Performed by: OBSTETRICS & GYNECOLOGY

## 2025-03-04 PROCEDURE — 7100000000 HC PACU RECOVERY - FIRST 15 MIN: Performed by: OBSTETRICS & GYNECOLOGY

## 2025-03-04 PROCEDURE — G0378 HOSPITAL OBSERVATION PER HR: HCPCS

## 2025-03-04 PROCEDURE — 81025 URINE PREGNANCY TEST: CPT

## 2025-03-04 PROCEDURE — 88305 TISSUE EXAM BY PATHOLOGIST: CPT

## 2025-03-04 PROCEDURE — S2900 ROBOTIC SURGICAL SYSTEM: HCPCS | Performed by: OBSTETRICS & GYNECOLOGY

## 2025-03-04 PROCEDURE — 6370000000 HC RX 637 (ALT 250 FOR IP): Performed by: ANESTHESIOLOGY

## 2025-03-04 PROCEDURE — 2709999900 HC NON-CHARGEABLE SUPPLY: Performed by: OBSTETRICS & GYNECOLOGY

## 2025-03-04 PROCEDURE — 2500000003 HC RX 250 WO HCPCS: Performed by: NURSE ANESTHETIST, CERTIFIED REGISTERED

## 2025-03-04 PROCEDURE — 7100000001 HC PACU RECOVERY - ADDTL 15 MIN: Performed by: OBSTETRICS & GYNECOLOGY

## 2025-03-04 PROCEDURE — 6370000000 HC RX 637 (ALT 250 FOR IP): Performed by: NURSE ANESTHETIST, CERTIFIED REGISTERED

## 2025-03-04 PROCEDURE — 6360000002 HC RX W HCPCS: Performed by: OBSTETRICS & GYNECOLOGY

## 2025-03-04 PROCEDURE — 2580000003 HC RX 258: Performed by: NURSE ANESTHETIST, CERTIFIED REGISTERED

## 2025-03-04 PROCEDURE — 6370000000 HC RX 637 (ALT 250 FOR IP): Performed by: OBSTETRICS & GYNECOLOGY

## 2025-03-04 PROCEDURE — 3700000001 HC ADD 15 MINUTES (ANESTHESIA): Performed by: OBSTETRICS & GYNECOLOGY

## 2025-03-04 PROCEDURE — 96372 THER/PROPH/DIAG INJ SC/IM: CPT

## 2025-03-04 PROCEDURE — 3600000019 HC SURGERY ROBOT ADDTL 15MIN: Performed by: OBSTETRICS & GYNECOLOGY

## 2025-03-04 RX ORDER — HYDRALAZINE HYDROCHLORIDE 20 MG/ML
10 INJECTION INTRAMUSCULAR; INTRAVENOUS ONCE
Status: DISCONTINUED | OUTPATIENT
Start: 2025-03-04 | End: 2025-03-04 | Stop reason: HOSPADM

## 2025-03-04 RX ORDER — MORPHINE SULFATE 4 MG/ML
4 INJECTION, SOLUTION INTRAMUSCULAR; INTRAVENOUS
Status: DISCONTINUED | OUTPATIENT
Start: 2025-03-04 | End: 2025-03-05 | Stop reason: HOSPADM

## 2025-03-04 RX ORDER — SODIUM CHLORIDE 9 MG/ML
INJECTION, SOLUTION INTRAVENOUS PRN
Status: DISCONTINUED | OUTPATIENT
Start: 2025-03-04 | End: 2025-03-05 | Stop reason: HOSPADM

## 2025-03-04 RX ORDER — PROCHLORPERAZINE EDISYLATE 5 MG/ML
10 INJECTION INTRAMUSCULAR; INTRAVENOUS EVERY 6 HOURS PRN
Status: DISCONTINUED | OUTPATIENT
Start: 2025-03-04 | End: 2025-03-05 | Stop reason: HOSPADM

## 2025-03-04 RX ORDER — BUPIVACAINE HYDROCHLORIDE 5 MG/ML
INJECTION, SOLUTION EPIDURAL; INTRACAUDAL PRN
Status: DISCONTINUED | OUTPATIENT
Start: 2025-03-04 | End: 2025-03-04 | Stop reason: HOSPADM

## 2025-03-04 RX ORDER — SODIUM CHLORIDE 0.9 % (FLUSH) 0.9 %
5-40 SYRINGE (ML) INJECTION EVERY 12 HOURS SCHEDULED
Status: DISCONTINUED | OUTPATIENT
Start: 2025-03-04 | End: 2025-03-04 | Stop reason: HOSPADM

## 2025-03-04 RX ORDER — ONDANSETRON 2 MG/ML
4 INJECTION INTRAMUSCULAR; INTRAVENOUS
Status: DISCONTINUED | OUTPATIENT
Start: 2025-03-04 | End: 2025-03-04 | Stop reason: HOSPADM

## 2025-03-04 RX ORDER — HYDROMORPHONE HYDROCHLORIDE 1 MG/ML
0.5 INJECTION, SOLUTION INTRAMUSCULAR; INTRAVENOUS; SUBCUTANEOUS EVERY 5 MIN PRN
Status: COMPLETED | OUTPATIENT
Start: 2025-03-04 | End: 2025-03-04

## 2025-03-04 RX ORDER — ONDANSETRON 2 MG/ML
INJECTION INTRAMUSCULAR; INTRAVENOUS
Status: DISCONTINUED | OUTPATIENT
Start: 2025-03-04 | End: 2025-03-04 | Stop reason: SDUPTHER

## 2025-03-04 RX ORDER — MIDAZOLAM HYDROCHLORIDE 1 MG/ML
INJECTION, SOLUTION INTRAMUSCULAR; INTRAVENOUS
Status: DISCONTINUED | OUTPATIENT
Start: 2025-03-04 | End: 2025-03-04 | Stop reason: SDUPTHER

## 2025-03-04 RX ORDER — OXYCODONE HYDROCHLORIDE 5 MG/1
5 TABLET ORAL
Status: DISCONTINUED | OUTPATIENT
Start: 2025-03-04 | End: 2025-03-04 | Stop reason: HOSPADM

## 2025-03-04 RX ORDER — PROPOFOL 10 MG/ML
INJECTION, EMULSION INTRAVENOUS
Status: DISCONTINUED | OUTPATIENT
Start: 2025-03-04 | End: 2025-03-04 | Stop reason: SDUPTHER

## 2025-03-04 RX ORDER — SCOPOLAMINE 1 MG/3D
PATCH, EXTENDED RELEASE TRANSDERMAL
Status: DISCONTINUED | OUTPATIENT
Start: 2025-03-04 | End: 2025-03-04 | Stop reason: SDUPTHER

## 2025-03-04 RX ORDER — LIDOCAINE HYDROCHLORIDE 10 MG/ML
1 INJECTION, SOLUTION EPIDURAL; INFILTRATION; INTRACAUDAL; PERINEURAL
Status: DISCONTINUED | OUTPATIENT
Start: 2025-03-04 | End: 2025-03-04 | Stop reason: HOSPADM

## 2025-03-04 RX ORDER — SODIUM CHLORIDE 9 MG/ML
INJECTION, SOLUTION INTRAVENOUS PRN
Status: DISCONTINUED | OUTPATIENT
Start: 2025-03-04 | End: 2025-03-04 | Stop reason: HOSPADM

## 2025-03-04 RX ORDER — ACETAMINOPHEN 500 MG
1000 TABLET ORAL ONCE
Status: COMPLETED | OUTPATIENT
Start: 2025-03-04 | End: 2025-03-04

## 2025-03-04 RX ORDER — ONDANSETRON 4 MG/1
4 TABLET, ORALLY DISINTEGRATING ORAL EVERY 8 HOURS PRN
Status: DISCONTINUED | OUTPATIENT
Start: 2025-03-04 | End: 2025-03-05 | Stop reason: HOSPADM

## 2025-03-04 RX ORDER — MORPHINE SULFATE 2 MG/ML
2 INJECTION, SOLUTION INTRAMUSCULAR; INTRAVENOUS
Status: DISCONTINUED | OUTPATIENT
Start: 2025-03-04 | End: 2025-03-05 | Stop reason: HOSPADM

## 2025-03-04 RX ORDER — MIDAZOLAM HYDROCHLORIDE 2 MG/2ML
2 INJECTION, SOLUTION INTRAMUSCULAR; INTRAVENOUS PRN
Status: DISCONTINUED | OUTPATIENT
Start: 2025-03-04 | End: 2025-03-04 | Stop reason: HOSPADM

## 2025-03-04 RX ORDER — SODIUM CHLORIDE 0.9 % (FLUSH) 0.9 %
5-40 SYRINGE (ML) INJECTION PRN
Status: DISCONTINUED | OUTPATIENT
Start: 2025-03-04 | End: 2025-03-04 | Stop reason: HOSPADM

## 2025-03-04 RX ORDER — NALOXONE HYDROCHLORIDE 0.4 MG/ML
INJECTION, SOLUTION INTRAMUSCULAR; INTRAVENOUS; SUBCUTANEOUS PRN
Status: DISCONTINUED | OUTPATIENT
Start: 2025-03-04 | End: 2025-03-04 | Stop reason: HOSPADM

## 2025-03-04 RX ORDER — ACETAMINOPHEN 500 MG
1000 TABLET ORAL EVERY 8 HOURS SCHEDULED
Status: DISCONTINUED | OUTPATIENT
Start: 2025-03-04 | End: 2025-03-05 | Stop reason: HOSPADM

## 2025-03-04 RX ORDER — DOCUSATE SODIUM 100 MG/1
100 CAPSULE, LIQUID FILLED ORAL 2 TIMES DAILY
Status: DISCONTINUED | OUTPATIENT
Start: 2025-03-04 | End: 2025-03-05 | Stop reason: HOSPADM

## 2025-03-04 RX ORDER — HYDROMORPHONE HYDROCHLORIDE 1 MG/ML
INJECTION, SOLUTION INTRAMUSCULAR; INTRAVENOUS; SUBCUTANEOUS
Status: DISCONTINUED | OUTPATIENT
Start: 2025-03-04 | End: 2025-03-04 | Stop reason: SDUPTHER

## 2025-03-04 RX ORDER — SODIUM CHLORIDE, SODIUM LACTATE, POTASSIUM CHLORIDE, CALCIUM CHLORIDE 600; 310; 30; 20 MG/100ML; MG/100ML; MG/100ML; MG/100ML
INJECTION, SOLUTION INTRAVENOUS CONTINUOUS
Status: DISCONTINUED | OUTPATIENT
Start: 2025-03-04 | End: 2025-03-04 | Stop reason: HOSPADM

## 2025-03-04 RX ORDER — OXYCODONE HYDROCHLORIDE 5 MG/1
5 TABLET ORAL EVERY 6 HOURS PRN
Qty: 22 TABLET | Refills: 0 | Status: SHIPPED | OUTPATIENT
Start: 2025-03-04 | End: 2025-03-05

## 2025-03-04 RX ORDER — OXYCODONE HYDROCHLORIDE 5 MG/1
10 TABLET ORAL EVERY 4 HOURS PRN
Status: DISCONTINUED | OUTPATIENT
Start: 2025-03-04 | End: 2025-03-05 | Stop reason: HOSPADM

## 2025-03-04 RX ORDER — FENTANYL CITRATE 50 UG/ML
25 INJECTION, SOLUTION INTRAMUSCULAR; INTRAVENOUS EVERY 5 MIN PRN
Status: COMPLETED | OUTPATIENT
Start: 2025-03-04 | End: 2025-03-04

## 2025-03-04 RX ORDER — CEFAZOLIN SODIUM 1 G/3ML
INJECTION, POWDER, FOR SOLUTION INTRAMUSCULAR; INTRAVENOUS
Status: DISCONTINUED | OUTPATIENT
Start: 2025-03-04 | End: 2025-03-04 | Stop reason: SDUPTHER

## 2025-03-04 RX ORDER — PHENYLEPHRINE HCL IN 0.9% NACL 0.4MG/10ML
SYRINGE (ML) INTRAVENOUS
Status: DISCONTINUED | OUTPATIENT
Start: 2025-03-04 | End: 2025-03-04 | Stop reason: SDUPTHER

## 2025-03-04 RX ORDER — ROCURONIUM BROMIDE 10 MG/ML
INJECTION, SOLUTION INTRAVENOUS
Status: DISCONTINUED | OUTPATIENT
Start: 2025-03-04 | End: 2025-03-04 | Stop reason: SDUPTHER

## 2025-03-04 RX ORDER — OXYCODONE HYDROCHLORIDE 5 MG/1
5 TABLET ORAL EVERY 4 HOURS PRN
Status: DISCONTINUED | OUTPATIENT
Start: 2025-03-04 | End: 2025-03-05 | Stop reason: HOSPADM

## 2025-03-04 RX ORDER — DEXAMETHASONE SODIUM PHOSPHATE 4 MG/ML
INJECTION, SOLUTION INTRA-ARTICULAR; INTRALESIONAL; INTRAMUSCULAR; INTRAVENOUS; SOFT TISSUE
Status: DISCONTINUED | OUTPATIENT
Start: 2025-03-04 | End: 2025-03-04 | Stop reason: SDUPTHER

## 2025-03-04 RX ORDER — LIDOCAINE HYDROCHLORIDE 20 MG/ML
INJECTION, SOLUTION EPIDURAL; INFILTRATION; INTRACAUDAL; PERINEURAL
Status: DISCONTINUED | OUTPATIENT
Start: 2025-03-04 | End: 2025-03-04 | Stop reason: SDUPTHER

## 2025-03-04 RX ORDER — FAMOTIDINE 20 MG/1
20 TABLET, FILM COATED ORAL 2 TIMES DAILY
Status: DISCONTINUED | OUTPATIENT
Start: 2025-03-04 | End: 2025-03-05 | Stop reason: HOSPADM

## 2025-03-04 RX ORDER — SUCCINYLCHOLINE/SOD CL,ISO/PF 200MG/10ML
SYRINGE (ML) INTRAVENOUS
Status: DISCONTINUED | OUTPATIENT
Start: 2025-03-04 | End: 2025-03-04 | Stop reason: SDUPTHER

## 2025-03-04 RX ORDER — FENTANYL CITRATE 50 UG/ML
INJECTION, SOLUTION INTRAMUSCULAR; INTRAVENOUS
Status: DISCONTINUED | OUTPATIENT
Start: 2025-03-04 | End: 2025-03-04 | Stop reason: SDUPTHER

## 2025-03-04 RX ORDER — ONDANSETRON 2 MG/ML
4 INJECTION INTRAMUSCULAR; INTRAVENOUS EVERY 6 HOURS PRN
Status: DISCONTINUED | OUTPATIENT
Start: 2025-03-04 | End: 2025-03-05 | Stop reason: HOSPADM

## 2025-03-04 RX ORDER — ENOXAPARIN SODIUM 100 MG/ML
40 INJECTION SUBCUTANEOUS ONCE
Status: COMPLETED | OUTPATIENT
Start: 2025-03-04 | End: 2025-03-04

## 2025-03-04 RX ORDER — FENTANYL CITRATE 50 UG/ML
100 INJECTION, SOLUTION INTRAMUSCULAR; INTRAVENOUS
Status: DISCONTINUED | OUTPATIENT
Start: 2025-03-04 | End: 2025-03-04 | Stop reason: HOSPADM

## 2025-03-04 RX ORDER — KETOROLAC TROMETHAMINE 30 MG/ML
30 INJECTION, SOLUTION INTRAMUSCULAR; INTRAVENOUS EVERY 6 HOURS
Status: DISCONTINUED | OUTPATIENT
Start: 2025-03-04 | End: 2025-03-05

## 2025-03-04 RX ORDER — SODIUM CHLORIDE 0.9 % (FLUSH) 0.9 %
5-40 SYRINGE (ML) INJECTION EVERY 12 HOURS SCHEDULED
Status: DISCONTINUED | OUTPATIENT
Start: 2025-03-04 | End: 2025-03-05 | Stop reason: HOSPADM

## 2025-03-04 RX ORDER — ALBUTEROL SULFATE 90 UG/1
INHALANT RESPIRATORY (INHALATION)
Status: DISCONTINUED | OUTPATIENT
Start: 2025-03-04 | End: 2025-03-04 | Stop reason: SDUPTHER

## 2025-03-04 RX ORDER — SODIUM CHLORIDE, SODIUM LACTATE, POTASSIUM CHLORIDE, CALCIUM CHLORIDE 600; 310; 30; 20 MG/100ML; MG/100ML; MG/100ML; MG/100ML
INJECTION, SOLUTION INTRAVENOUS
Status: DISCONTINUED | OUTPATIENT
Start: 2025-03-04 | End: 2025-03-04 | Stop reason: SDUPTHER

## 2025-03-04 RX ORDER — SODIUM CHLORIDE, SODIUM LACTATE, POTASSIUM CHLORIDE, CALCIUM CHLORIDE 600; 310; 30; 20 MG/100ML; MG/100ML; MG/100ML; MG/100ML
INJECTION, SOLUTION INTRAVENOUS CONTINUOUS
Status: DISCONTINUED | OUTPATIENT
Start: 2025-03-04 | End: 2025-03-05 | Stop reason: HOSPADM

## 2025-03-04 RX ORDER — PROCHLORPERAZINE EDISYLATE 5 MG/ML
5 INJECTION INTRAMUSCULAR; INTRAVENOUS
Status: DISCONTINUED | OUTPATIENT
Start: 2025-03-04 | End: 2025-03-04 | Stop reason: HOSPADM

## 2025-03-04 RX ORDER — IBUPROFEN 800 MG/1
800 TABLET, FILM COATED ORAL 3 TIMES DAILY PRN
Qty: 90 TABLET | Refills: 0 | Status: SHIPPED | OUTPATIENT
Start: 2025-03-04 | End: 2025-03-05

## 2025-03-04 RX ORDER — SODIUM CHLORIDE 0.9 % (FLUSH) 0.9 %
5-40 SYRINGE (ML) INJECTION PRN
Status: DISCONTINUED | OUTPATIENT
Start: 2025-03-04 | End: 2025-03-05 | Stop reason: HOSPADM

## 2025-03-04 RX ORDER — IBUPROFEN 400 MG/1
800 TABLET, FILM COATED ORAL EVERY 8 HOURS
Status: DISCONTINUED | OUTPATIENT
Start: 2025-03-05 | End: 2025-03-05

## 2025-03-04 RX ORDER — METRONIDAZOLE 500 MG/100ML
500 INJECTION, SOLUTION INTRAVENOUS ONCE
Status: DISCONTINUED | OUTPATIENT
Start: 2025-03-04 | End: 2025-03-05 | Stop reason: HOSPADM

## 2025-03-04 RX ADMIN — KETOROLAC TROMETHAMINE 30 MG: 30 INJECTION, SOLUTION INTRAMUSCULAR at 16:12

## 2025-03-04 RX ADMIN — HYDROMORPHONE HYDROCHLORIDE 0.5 MG: 1 INJECTION, SOLUTION INTRAMUSCULAR; INTRAVENOUS; SUBCUTANEOUS at 11:50

## 2025-03-04 RX ADMIN — HYDROMORPHONE HYDROCHLORIDE 0.2 MG: 1 INJECTION, SOLUTION INTRAMUSCULAR; INTRAVENOUS; SUBCUTANEOUS at 08:06

## 2025-03-04 RX ADMIN — SUGAMMADEX 200 MG: 100 INJECTION, SOLUTION INTRAVENOUS at 09:42

## 2025-03-04 RX ADMIN — HYDROMORPHONE HYDROCHLORIDE 0.3 MG: 1 INJECTION, SOLUTION INTRAMUSCULAR; INTRAVENOUS; SUBCUTANEOUS at 08:09

## 2025-03-04 RX ADMIN — HYDROMORPHONE HYDROCHLORIDE 0.5 MG: 1 INJECTION, SOLUTION INTRAMUSCULAR; INTRAVENOUS; SUBCUTANEOUS at 12:05

## 2025-03-04 RX ADMIN — ROCURONIUM BROMIDE 10 MG: 10 INJECTION, SOLUTION INTRAVENOUS at 08:04

## 2025-03-04 RX ADMIN — SODIUM CHLORIDE, POTASSIUM CHLORIDE, SODIUM LACTATE AND CALCIUM CHLORIDE: 600; 310; 30; 20 INJECTION, SOLUTION INTRAVENOUS at 14:14

## 2025-03-04 RX ADMIN — LIDOCAINE HYDROCHLORIDE 60 MG: 20 INJECTION, SOLUTION EPIDURAL; INFILTRATION; INTRACAUDAL; PERINEURAL at 07:38

## 2025-03-04 RX ADMIN — FENTANYL CITRATE 25 MCG: 50 INJECTION INTRAMUSCULAR; INTRAVENOUS at 11:10

## 2025-03-04 RX ADMIN — SODIUM CHLORIDE, POTASSIUM CHLORIDE, SODIUM LACTATE AND CALCIUM CHLORIDE: 600; 310; 30; 20 INJECTION, SOLUTION INTRAVENOUS at 10:45

## 2025-03-04 RX ADMIN — ACETAMINOPHEN 1000 MG: 500 TABLET ORAL at 17:07

## 2025-03-04 RX ADMIN — ROCURONIUM BROMIDE 10 MG: 10 INJECTION, SOLUTION INTRAVENOUS at 08:30

## 2025-03-04 RX ADMIN — PROPOFOL 100 MCG/KG/MIN: 10 INJECTION, EMULSION INTRAVENOUS at 07:47

## 2025-03-04 RX ADMIN — ONDANSETRON 4 MG: 2 INJECTION, SOLUTION INTRAMUSCULAR; INTRAVENOUS at 09:37

## 2025-03-04 RX ADMIN — ROCURONIUM BROMIDE 5 MG: 10 INJECTION, SOLUTION INTRAVENOUS at 07:38

## 2025-03-04 RX ADMIN — MORPHINE SULFATE 2 MG: 2 INJECTION, SOLUTION INTRAMUSCULAR; INTRAVENOUS at 20:20

## 2025-03-04 RX ADMIN — PROPOFOL 200 MG: 10 INJECTION, EMULSION INTRAVENOUS at 07:38

## 2025-03-04 RX ADMIN — KETOROLAC TROMETHAMINE 30 MG: 30 INJECTION, SOLUTION INTRAMUSCULAR at 20:21

## 2025-03-04 RX ADMIN — DOCUSATE SODIUM 100 MG: 100 CAPSULE, LIQUID FILLED ORAL at 20:20

## 2025-03-04 RX ADMIN — FENTANYL CITRATE 100 MCG: 50 INJECTION INTRAMUSCULAR; INTRAVENOUS at 07:38

## 2025-03-04 RX ADMIN — ALBUTEROL SULFATE 6 PUFF: 90 AEROSOL, METERED RESPIRATORY (INHALATION) at 10:02

## 2025-03-04 RX ADMIN — ROCURONIUM BROMIDE 35 MG: 10 INJECTION, SOLUTION INTRAVENOUS at 08:00

## 2025-03-04 RX ADMIN — Medication 160 MG: at 07:38

## 2025-03-04 RX ADMIN — SODIUM CHLORIDE, POTASSIUM CHLORIDE, SODIUM LACTATE AND CALCIUM CHLORIDE: 600; 310; 30; 20 INJECTION, SOLUTION INTRAVENOUS at 08:48

## 2025-03-04 RX ADMIN — CEFAZOLIN 2 G: 1 INJECTION, POWDER, FOR SOLUTION INTRAMUSCULAR; INTRAVENOUS at 07:41

## 2025-03-04 RX ADMIN — FENTANYL CITRATE 25 MCG: 50 INJECTION INTRAMUSCULAR; INTRAVENOUS at 12:30

## 2025-03-04 RX ADMIN — FAMOTIDINE 20 MG: 20 TABLET, FILM COATED ORAL at 20:20

## 2025-03-04 RX ADMIN — OXYCODONE HYDROCHLORIDE 5 MG: 5 TABLET ORAL at 17:07

## 2025-03-04 RX ADMIN — ACETAMINOPHEN 1000 MG: 500 TABLET ORAL at 07:09

## 2025-03-04 RX ADMIN — ENOXAPARIN SODIUM 40 MG: 100 INJECTION SUBCUTANEOUS at 07:09

## 2025-03-04 RX ADMIN — SCOPALAMINE 1 PATCH: 1 PATCH, EXTENDED RELEASE TRANSDERMAL at 07:05

## 2025-03-04 RX ADMIN — FENTANYL CITRATE 25 MCG: 50 INJECTION INTRAMUSCULAR; INTRAVENOUS at 13:34

## 2025-03-04 RX ADMIN — SODIUM CHLORIDE, POTASSIUM CHLORIDE, SODIUM LACTATE AND CALCIUM CHLORIDE: 600; 310; 30; 20 INJECTION, SOLUTION INTRAVENOUS at 07:28

## 2025-03-04 RX ADMIN — MIDAZOLAM 2 MG: 1 INJECTION INTRAMUSCULAR; INTRAVENOUS at 07:26

## 2025-03-04 RX ADMIN — Medication 40 MCG: at 07:59

## 2025-03-04 RX ADMIN — DEXAMETHASONE SODIUM PHOSPHATE 8 MG: 4 INJECTION INTRA-ARTICULAR; INTRALESIONAL; INTRAMUSCULAR; INTRAVENOUS; SOFT TISSUE at 08:11

## 2025-03-04 RX ADMIN — FENTANYL CITRATE 25 MCG: 50 INJECTION INTRAMUSCULAR; INTRAVENOUS at 11:25

## 2025-03-04 ASSESSMENT — PAIN DESCRIPTION - ORIENTATION
ORIENTATION: RIGHT;ANTERIOR;LOWER
ORIENTATION: ANTERIOR;LOWER;RIGHT
ORIENTATION: ANTERIOR;LOWER;RIGHT
ORIENTATION: RIGHT

## 2025-03-04 ASSESSMENT — PAIN DESCRIPTION - LOCATION
LOCATION: ABDOMEN

## 2025-03-04 ASSESSMENT — PAIN DESCRIPTION - DESCRIPTORS
DESCRIPTORS: ACHING;DISCOMFORT
DESCRIPTORS: CRAMPING
DESCRIPTORS: ACHING;DISCOMFORT
DESCRIPTORS: STABBING
DESCRIPTORS: ACHING;DISCOMFORT;CRAMPING
DESCRIPTORS: ACHING;SHARP;BURNING
DESCRIPTORS: ACHING;DISCOMFORT

## 2025-03-04 ASSESSMENT — PAIN - FUNCTIONAL ASSESSMENT
PAIN_FUNCTIONAL_ASSESSMENT: 0-10
PAIN_FUNCTIONAL_ASSESSMENT: NONE - DENIES PAIN

## 2025-03-04 ASSESSMENT — PAIN SCALES - GENERAL
PAINLEVEL_OUTOF10: 10
PAINLEVEL_OUTOF10: 7
PAINLEVEL_OUTOF10: 7
PAINLEVEL_OUTOF10: 6
PAINLEVEL_OUTOF10: 7
PAINLEVEL_OUTOF10: 9
PAINLEVEL_OUTOF10: 10
PAINLEVEL_OUTOF10: 4

## 2025-03-04 NOTE — ANESTHESIA POSTPROCEDURE EVALUATION
Post-Anesthesia Evaluation and Assessment    Patient: Michael Meeks MRN: [id number]  SSN: [id number]    YOB: 1985  Age: 39 y.o.  Sex: female      I have evaluated the patient and they are stable and ready for discharge from the PACU.     Cardiovascular Function/Vital Signs  Visit Vitals  /67   Pulse 98   Temp 98 °F (36.7 °C) (Oral)   Resp 19   SpO2 100%       Patient is status post General anesthesia for Procedure(s):  ROBOTIC MYOMECTOMY, LYSIS OF ADHESIONS.    Nausea/Vomiting: None    Postoperative hydration reviewed and adequate.    Pain:  Managed    Neurological Status:   At baseline    Mental Status, Level of Consciousness: Alert and  oriented to person, place, and time    Pulmonary Status:   Adequate oxygenation and airway patent    Complications related to anesthesia: None    Post-anesthesia assessment completed. No concerns    Signed By: William Prather MD     March 4, 2025

## 2025-03-04 NOTE — OP NOTE
[address]  [address]                            OPERATIVE REPORT      PATIENT NAME: MANUELA WARD             : 1985  MED REC NO: [id number]                       ROOM: OR  ACCOUNT NO: [id number]                       ADMIT DATE: 2025  PROVIDER: Maryam Boyd MD    DATE OF SERVICE:  2025    PREOPERATIVE DIAGNOSES:       1. Menorrhagia.     2. Uterine fibroids.    POSTOPERATIVE DIAGNOSES:       1. Menorrhagia.     2. Uterine fibroids.     3. Status post robotic myomectomy.    PROCEDURES PERFORMED:       1. Robotic myomectomy.     2. Lysis of adhesions.    SURGEON:  Maryam Boyd MD    ASSISTANT:  ***    ANESTHESIA:  General.    ESTIMATED BLOOD LOSS:  50 mL.    SPECIMENS REMOVED:  Pathology is leiomyomas.    INTRAOPERATIVE FINDINGS:  An enlarged irregular contoured uterus consistent with a fibroid uterus.  Three fibroids removed, ranging in size from 1 to approximately 3 to 4 cm.  Adhesions noted of the left fallopian tube and on the left anterior of the uterus to the bladder and the abdominal wall.     COMPLICATIONS:  ***    IMPLANTS:  ***    INDICATIONS:  The patient is a pleasant 39-year-old female, who came to attention of Gyn Services secondary to complaints of menorrhagia.  Ultrasound findings were significant for a multi-fibroid enlarged uterus.  The patient desired to retain fertility options.  The patient desired surgical management with myomectomy.  The patient was counseled on the risks and benefits of myomectomy.  She understood the risks could include bleeding, infection, risk of damage to nearby pelvic structures such as bowel, bladder, arteries, nerves, ureters, etc.  Risk of regrowth of fibroids, risk of incomplete resection of all fibroids, risk of continued problematic menstruation, risk of need for subsequent procedures, risk of the anesthesia, risk of scar tissue formation from the procedure, etc.

## 2025-03-04 NOTE — PROGRESS NOTES
TRANSFER - IN REPORT:    Verbal report received from Octavia MEI on [facility]  being received from PACU for routine post-op      Report consisted of patient's Situation, Background, Assessment and   Recommendations(SBAR).     Information from the following report(s) Nurse Handoff Report, Surgery Report, Intake/Output, MAR, and Recent Results was reviewed with the receiving nurse.    Opportunity for questions and clarification was provided.      Assessment completed upon patient's arrival to unit and care assumed.

## 2025-03-04 NOTE — ANESTHESIA PRE PROCEDURE
packs/day: 0.25     Types: Cigarettes   • Smokeless tobacco: Never   • Tobacco comments:     Quit smokin cigarette a day   Substance Use Topics   • Alcohol use: Yes     Comment: EVERY OTHER MONTH                                Counseling given: Not Answered  Tobacco comments: Quit smokin cigarette a day      Vital Signs (Current): There were no vitals filed for this visit.                                           BP Readings from Last 3 Encounters:   25 (!) 110/59   24 90/60   23 103/69       NPO Status:                                                                                 BMI:   Wt Readings from Last 3 Encounters:   25 66.6 kg (146 lb 12.8 oz)   24 66.7 kg (147 lb)   23 64.8 kg (142 lb 12.8 oz)     There is no height or weight on file to calculate BMI.    CBC:   Lab Results   Component Value Date/Time    WBC 6.1 2025 10:13 AM    RBC 4.07 2025 10:13 AM    HGB 12.6 2025 10:13 AM    HCT 38.5 2025 10:13 AM    MCV 94.6 2025 10:13 AM    RDW 12.2 2025 10:13 AM     2025 10:13 AM       CMP:   Lab Results   Component Value Date/Time     2025 01:58 PM    K 4.4 2025 01:58 PM     2025 01:58 PM    CO2 22 2025 01:58 PM    BUN 10 2025 01:58 PM    CREATININE 0.73 2025 01:58 PM    GFRAA >60 2022 04:31 PM    AGRATIO 1.2 2022 04:31 PM    LABGLOM 107 2025 01:58 PM    LABGLOM >60 2023 11:59 AM    GLUCOSE 89 2025 01:58 PM    CALCIUM 8.9 2025 01:58 PM    BILITOT 0.4 2025 01:58 PM    ALKPHOS 61 2025 01:58 PM    AST 20 2025 01:58 PM    ALT 10 2025 01:58 PM       POC Tests: No results for input(s): \"POCGLU\", \"POCNA\", \"POCK\", \"POCCL\", \"POCBUN\", \"POCHEMO\", \"POCHCT\" in the last 72 hours.    Coags: No results found for: \"PROTIME\", \"INR\", \"APTT\"    HCG (If Applicable):   Lab Results   Component Value Date    HCGQUANT <1 2017

## 2025-03-04 NOTE — PROGRESS NOTES
Bedside and Verbal shift change report given to SANDRA Alanis RN and CHRISTIN Tejeda RN (oncoming nurse) by TAMARA Casiano RN (offgoing nurse). Report included the following information Nurse Handoff Report, Surgery Report, Intake/Output, and MAR.

## 2025-03-04 NOTE — BRIEF OP NOTE
Brief Postoperative Note      Patient: Michael Meeks  YOB: 1985  MRN: [id number]    Date of Procedure: 3/4/2025    Pre-Op Diagnosis Codes:      * Menorrhagia with regular cycle [N92.0]    Post-Op Diagnosis: Same       Procedure(s):  ROBOTIC MYOMECTOMY, LYSIS OF ADHESIONS    Surgeon(s):  Maryam Boyd MD    Assistant:  Surgical Assistant: Harvey Melo    Anesthesia: General    Estimated Blood Loss (mL): less than 50     Complications: None    Specimens:   ID Type Source Tests Collected by Time Destination   1 : Uterine Fibroids Tissue Uterus SURGICAL PATHOLOGY Maryam Boyd MD 3/4/2025 0930        Implants:  * No implants in log *      Drains: * No LDAs found *    Findings:  Infection Present At Time Of Surgery (PATOS) (choose all levels that have infection present):  No infection present  Other Findings: adhesion of the left fallopian tube and anterior uterus     Electronically signed by Maryam Boyd MD on 3/4/2025 at 9:37 AM

## 2025-03-04 NOTE — PROGRESS NOTES
TRANSFER - OUT REPORT:    Verbal report given to SIGIFREDO Liu on Michael Meeks  being transferred to  for routine post-op       Report consisted of patient's Situation, Background, Assessment and   Recommendations(SBAR).     Information from the following report(s) Surgery Report, Intake/Output, MAR, and Recent Results was reviewed with the receiving nurse.           Lines:   Peripheral IV 03/04/25 Distal;Posterior;Right Forearm (Active)   Site Assessment Clean, dry & intact 03/04/25 1050   Line Status Flushed;Infusing 03/04/25 1050   Line Care Cap changed;Connections checked and tightened 03/04/25 1050   Phlebitis Assessment No symptoms 03/04/25 1050   Infiltration Assessment 0 03/04/25 1050   Alcohol Cap Used Yes 03/04/25 1050   Dressing Status Clean, dry & intact 03/04/25 1050   Dressing Type Transparent 03/04/25 1050        Opportunity for questions and clarification was provided.      Patient transported with:  Tech

## 2025-03-05 VITALS
RESPIRATION RATE: 18 BRPM | TEMPERATURE: 97.8 F | HEART RATE: 64 BPM | DIASTOLIC BLOOD PRESSURE: 62 MMHG | SYSTOLIC BLOOD PRESSURE: 101 MMHG | OXYGEN SATURATION: 99 %

## 2025-03-05 LAB
ERYTHROCYTE [DISTWIDTH] IN BLOOD BY AUTOMATED COUNT: 12.5 % (ref 11.5–14.5)
HCT VFR BLD AUTO: 30.8 % (ref 35–47)
HGB BLD-MCNC: 10.5 G/DL (ref 11.5–16)
MCH RBC QN AUTO: 31.8 PG (ref 26–34)
MCHC RBC AUTO-ENTMCNC: 34.1 G/DL (ref 30–36.5)
MCV RBC AUTO: 93.3 FL (ref 80–99)
NRBC # BLD: 0 K/UL (ref 0–0.01)
NRBC BLD-RTO: 0 PER 100 WBC
PLATELET # BLD AUTO: 237 K/UL (ref 150–400)
PMV BLD AUTO: 11.1 FL (ref 8.9–12.9)
RBC # BLD AUTO: 3.3 M/UL (ref 3.8–5.2)
WBC # BLD AUTO: 10 K/UL (ref 3.6–11)

## 2025-03-05 PROCEDURE — 6370000000 HC RX 637 (ALT 250 FOR IP): Performed by: OBSTETRICS & GYNECOLOGY

## 2025-03-05 PROCEDURE — 6360000002 HC RX W HCPCS: Performed by: OBSTETRICS & GYNECOLOGY

## 2025-03-05 PROCEDURE — 96374 THER/PROPH/DIAG INJ IV PUSH: CPT

## 2025-03-05 PROCEDURE — 85027 COMPLETE CBC AUTOMATED: CPT

## 2025-03-05 PROCEDURE — G0378 HOSPITAL OBSERVATION PER HR: HCPCS

## 2025-03-05 RX ORDER — PSEUDOEPHEDRINE HCL 30 MG
100 TABLET ORAL 2 TIMES DAILY PRN
Qty: 60 CAPSULE | Refills: 0 | Status: SHIPPED | OUTPATIENT
Start: 2025-03-05

## 2025-03-05 RX ORDER — OXYCODONE HYDROCHLORIDE 5 MG/1
5 TABLET ORAL EVERY 6 HOURS PRN
Qty: 23 TABLET | Refills: 0 | Status: SHIPPED | OUTPATIENT
Start: 2025-03-05 | End: 2025-03-12

## 2025-03-05 RX ORDER — ACETAMINOPHEN 500 MG
1000 TABLET ORAL EVERY 8 HOURS SCHEDULED
Qty: 120 TABLET | Refills: 3 | Status: SHIPPED | OUTPATIENT
Start: 2025-03-05

## 2025-03-05 RX ORDER — IBUPROFEN 400 MG/1
800 TABLET, FILM COATED ORAL EVERY 8 HOURS
Status: DISCONTINUED | OUTPATIENT
Start: 2025-03-05 | End: 2025-03-05 | Stop reason: HOSPADM

## 2025-03-05 RX ORDER — IBUPROFEN 800 MG/1
800 TABLET, FILM COATED ORAL 3 TIMES DAILY PRN
Qty: 90 TABLET | Refills: 1 | Status: SHIPPED | OUTPATIENT
Start: 2025-03-05

## 2025-03-05 RX ADMIN — ACETAMINOPHEN 1000 MG: 500 TABLET ORAL at 08:00

## 2025-03-05 RX ADMIN — IBUPROFEN 800 MG: 400 TABLET, FILM COATED ORAL at 08:00

## 2025-03-05 RX ADMIN — ACETAMINOPHEN 1000 MG: 500 TABLET ORAL at 00:05

## 2025-03-05 RX ADMIN — OXYCODONE HYDROCHLORIDE 10 MG: 5 TABLET ORAL at 11:27

## 2025-03-05 RX ADMIN — OXYCODONE HYDROCHLORIDE 10 MG: 5 TABLET ORAL at 00:05

## 2025-03-05 RX ADMIN — FAMOTIDINE 20 MG: 20 TABLET, FILM COATED ORAL at 08:00

## 2025-03-05 RX ADMIN — DOCUSATE SODIUM 100 MG: 100 CAPSULE, LIQUID FILLED ORAL at 08:00

## 2025-03-05 RX ADMIN — KETOROLAC TROMETHAMINE 30 MG: 30 INJECTION, SOLUTION INTRAMUSCULAR at 04:48

## 2025-03-05 RX ADMIN — ONDANSETRON 4 MG: 4 TABLET, ORALLY DISINTEGRATING ORAL at 11:29

## 2025-03-05 RX ADMIN — OXYCODONE HYDROCHLORIDE 10 MG: 5 TABLET ORAL at 06:42

## 2025-03-05 ASSESSMENT — PAIN SCALES - GENERAL
PAINLEVEL_OUTOF10: 3
PAINLEVEL_OUTOF10: 7
PAINLEVEL_OUTOF10: 8
PAINLEVEL_OUTOF10: 7

## 2025-03-05 ASSESSMENT — PAIN DESCRIPTION - ORIENTATION
ORIENTATION: ANTERIOR;MID
ORIENTATION: ANTERIOR;MID
ORIENTATION: ANTERIOR;LOWER
ORIENTATION: ANTERIOR;MID

## 2025-03-05 ASSESSMENT — PAIN DESCRIPTION - LOCATION
LOCATION: ABDOMEN

## 2025-03-05 ASSESSMENT — PAIN DESCRIPTION - DESCRIPTORS
DESCRIPTORS: ACHING;DISCOMFORT
DESCRIPTORS: ACHING;SORE
DESCRIPTORS: ACHING;DISCOMFORT
DESCRIPTORS: ACHING;CRAMPING;SHARP

## 2025-03-05 NOTE — PROGRESS NOTES
Bedside and Verbal shift change report given to SANDRA Alanis RN  (oncoming nurse) by SANDRA Horton RN  (offgoing nurse). Report included the following information SBAR, Kardex, Intake/Output, MAR, and Recent Results.       1003- I have reviewed discharge instruction and reviewed medication times. Patient given copy of discharge instructions. Patient verbalizes understanding and denies any further questions at this time.

## 2025-03-05 NOTE — PROGRESS NOTES
Michael Meeks     Patient doing well without significant complaint. Nausea and vomiting resolved, tolerating liquids, no flatus, + void.  Pt reports more pain on right side of abdomen than the left.      Vitals:    Vitals:    25 0800   BP: 101/62   Pulse: 64   Resp: 18   Temp: 97.8 °F (36.6 °C)   SpO2: 99%     Temp (24hrs), Av.1 °F (36.7 °C), Min:97.3 °F (36.3 °C), Max:98.9 °F (37.2 °C)         Intake and Output:   Current shift: No intake/output data recorded.  Last 3 completed shifts: 1901 -  07  In: 1500 [I.V.:1500]  Out: 5450 [Urine:5400]        Exam:        Patient without distress.      Lungs clear.  Abdomen, bowel sounds present, soft, expected tenderness, fundus firm       Abdominal incisions w/ dermabond well approximated, no s/s infection                 Lower extremities are negative for swelling, cords or tenderness.    Labs:   Lab Results   Component Value Date/Time    WBC 10.0 2025 04:55 AM    WBC 6.1 2025 10:13 AM    WBC 5.3 2025 01:58 PM    WBC 7.1 2023 11:59 AM    WBC 6.6 2022 04:31 PM    WBC 6.4 2021 08:01 AM    WBC 6.1 2020 10:08 AM    WBC 6.4 2019 10:23 AM    HGB 10.5 2025 04:55 AM    HGB 12.6 2025 10:13 AM    HGB 13.1 2025 01:58 PM    HGB 12.4 2023 11:59 AM    HGB 11.6 2022 04:31 PM    HGB 11.5 2021 08:01 AM    HGB 12.5 2020 10:08 AM    HGB 12.7 2019 10:23 AM    HCT 30.8 2025 04:55 AM    HCT 38.5 2025 10:13 AM    HCT 38.5 2025 01:58 PM    HCT 38.1 2023 11:59 AM    HCT 35.9 2022 04:31 PM    HCT 34.6 2021 08:01 AM    HCT 35.9 2020 10:08 AM    HCT 36.8 2019 10:23 AM     2025 04:55 AM     2025 10:13 AM     2025 01:58 PM     2023 11:59 AM     2022 04:31 PM     2021 08:01 AM     2020 10:08 AM     2019 10:23 AM       Recent Results

## 2025-03-05 NOTE — DISCHARGE SUMMARY
EpiPen 2-Silvio  Inject 0.3 mLs into the muscle once for 1 dose Use as directed for allergic reaction     famotidine 40 MG tablet  Commonly known as: PEPCID  Take 1 tablet by mouth 2 times daily     fish oil 1000 MG capsule     fluticasone 50 MCG/ACT nasal spray  Commonly known as: FLONASE  2 sprays by Each Nostril route daily     HAIR SKIN & NAILS PO     vitamin B-12 1000 MCG tablet  Commonly known as: CYANOCOBALAMIN     vitamin D 50 MCG (2000 UT) Caps capsule  Commonly known as: CHOLECALCIFEROL     zinc 50 MG Tabs tablet           * This list has 2 medication(s) that are the same as other medications prescribed for you. Read the directions carefully, and ask your doctor or other care provider to review them with you.                STOP taking these medications      naproxen 500 MG tablet  Commonly known as: NAPROSYN               Where to Get Your Medications        These medications were sent to [facility] at [place] [address] - P [phone] - F [phone]  [address] [place] [id number]      Phone: [phone]   acetaminophen 500 MG tablet  docusate 100 MG Caps  ibuprofen 800 MG tablet  oxyCODONE 5 MG immediate release tablet          * Follow-up Care/Patient Instructions:  Activity: No sex, douching, or tampons for 6 weeks or as directed by your physician. No heavy lifting for 6 weeks. No driving while taking pain medication.  Diet: Resume pre-hospital diet  Wound Care: keep wound clean and dry    1 weeks GYN office

## 2025-03-07 ENCOUNTER — APPOINTMENT (OUTPATIENT)
Facility: HOSPITAL | Age: 40
End: 2025-03-07
Payer: COMMERCIAL

## 2025-03-07 ENCOUNTER — TELEPHONE (OUTPATIENT)
Age: 40
End: 2025-03-07

## 2025-03-07 ENCOUNTER — APPOINTMENT (OUTPATIENT)
Facility: HOSPITAL | Age: 40
End: 2025-03-07
Attending: STUDENT IN AN ORGANIZED HEALTH CARE EDUCATION/TRAINING PROGRAM
Payer: COMMERCIAL

## 2025-03-07 ENCOUNTER — HOSPITAL ENCOUNTER (EMERGENCY)
Facility: HOSPITAL | Age: 40
Discharge: HOME | End: 2025-03-07
Attending: STUDENT IN AN ORGANIZED HEALTH CARE EDUCATION/TRAINING PROGRAM
Payer: COMMERCIAL

## 2025-03-07 VITALS
OXYGEN SATURATION: 98 % | DIASTOLIC BLOOD PRESSURE: 59 MMHG | HEART RATE: 86 BPM | HEIGHT: 65 IN | RESPIRATION RATE: 27 BRPM | WEIGHT: 154.54 LBS | SYSTOLIC BLOOD PRESSURE: 105 MMHG | TEMPERATURE: 99.1 F | BODY MASS INDEX: 25.75 KG/M2

## 2025-03-07 DIAGNOSIS — S30.1XXA ABDOMINAL WALL HEMATOMA, INITIAL ENCOUNTER: Primary | ICD-10-CM

## 2025-03-07 DIAGNOSIS — L03.311 CELLULITIS OF ABDOMINAL WALL: ICD-10-CM

## 2025-03-07 DIAGNOSIS — R06.02 SHORTNESS OF BREATH: ICD-10-CM

## 2025-03-07 DIAGNOSIS — M79.89 LEG SWELLING: ICD-10-CM

## 2025-03-07 LAB
ALBUMIN SERPL-MCNC: 3.2 G/DL (ref 3.5–5)
ALBUMIN/GLOB SERPL: 0.9 (ref 1.1–2.2)
ALP SERPL-CCNC: 43 U/L (ref 45–117)
ALT SERPL-CCNC: 15 U/L (ref 12–78)
ANION GAP SERPL CALC-SCNC: 5 MMOL/L (ref 2–12)
APPEARANCE UR: CLEAR
AST SERPL-CCNC: 24 U/L (ref 15–37)
BACTERIA URNS QL MICRO: NEGATIVE /HPF
BASOPHILS # BLD: 0.04 K/UL (ref 0–0.1)
BASOPHILS NFR BLD: 0.5 % (ref 0–1)
BILIRUB SERPL-MCNC: 0.4 MG/DL (ref 0.2–1)
BILIRUB UR QL: NEGATIVE
BUN SERPL-MCNC: 8 MG/DL (ref 6–20)
BUN/CREAT SERPL: 12 (ref 12–20)
CALCIUM SERPL-MCNC: 8.8 MG/DL (ref 8.5–10.1)
CHLORIDE SERPL-SCNC: 104 MMOL/L (ref 97–108)
CO2 SERPL-SCNC: 27 MMOL/L (ref 21–32)
COLOR UR: NORMAL
COMMENT:: NORMAL
CREAT SERPL-MCNC: 0.69 MG/DL (ref 0.55–1.02)
D DIMER PPP FEU-MCNC: 6.22 MG/L FEU (ref 0–0.65)
DIFFERENTIAL METHOD BLD: ABNORMAL
ECHO BSA: 1.79 M2
EKG ATRIAL RATE: 75 BPM
EKG DIAGNOSIS: NORMAL
EKG P AXIS: 63 DEGREES
EKG P-R INTERVAL: 158 MS
EKG Q-T INTERVAL: 360 MS
EKG QRS DURATION: 86 MS
EKG QTC CALCULATION (BAZETT): 402 MS
EKG R AXIS: 76 DEGREES
EKG T AXIS: 59 DEGREES
EKG VENTRICULAR RATE: 75 BPM
EOSINOPHIL # BLD: 0.52 K/UL (ref 0–0.4)
EOSINOPHIL NFR BLD: 6.8 % (ref 0–7)
EPITH CASTS URNS QL MICRO: NORMAL /LPF
ERYTHROCYTE [DISTWIDTH] IN BLOOD BY AUTOMATED COUNT: 12.4 % (ref 11.5–14.5)
FLUAV RNA SPEC QL NAA+PROBE: NOT DETECTED
FLUBV RNA SPEC QL NAA+PROBE: NOT DETECTED
GLOBULIN SER CALC-MCNC: 3.7 G/DL (ref 2–4)
GLUCOSE SERPL-MCNC: 105 MG/DL (ref 65–100)
GLUCOSE UR STRIP.AUTO-MCNC: NEGATIVE MG/DL
HCT VFR BLD AUTO: 32.3 % (ref 35–47)
HGB BLD-MCNC: 10.9 G/DL (ref 11.5–16)
HGB UR QL STRIP: NEGATIVE
HYALINE CASTS URNS QL MICRO: NORMAL /LPF (ref 0–5)
IMM GRANULOCYTES # BLD AUTO: 0.02 K/UL (ref 0–0.04)
IMM GRANULOCYTES NFR BLD AUTO: 0.3 % (ref 0–0.5)
KETONES UR QL STRIP.AUTO: NEGATIVE MG/DL
LEUKOCYTE ESTERASE UR QL STRIP.AUTO: NEGATIVE
LYMPHOCYTES # BLD: 2.15 K/UL (ref 0.8–3.5)
LYMPHOCYTES NFR BLD: 28.1 % (ref 12–49)
MCH RBC QN AUTO: 32.2 PG (ref 26–34)
MCHC RBC AUTO-ENTMCNC: 33.7 G/DL (ref 30–36.5)
MCV RBC AUTO: 95.6 FL (ref 80–99)
MONOCYTES # BLD: 0.66 K/UL (ref 0–1)
MONOCYTES NFR BLD: 8.6 % (ref 5–13)
NEUTS SEG # BLD: 4.26 K/UL (ref 1.8–8)
NEUTS SEG NFR BLD: 55.7 % (ref 32–75)
NITRITE UR QL STRIP.AUTO: NEGATIVE
NRBC # BLD: 0 K/UL (ref 0–0.01)
NRBC BLD-RTO: 0 PER 100 WBC
PH UR STRIP: 7.5 (ref 5–8)
PLATELET # BLD AUTO: 229 K/UL (ref 150–400)
PMV BLD AUTO: 10.7 FL (ref 8.9–12.9)
POTASSIUM SERPL-SCNC: 4 MMOL/L (ref 3.5–5.1)
PROT SERPL-MCNC: 6.9 G/DL (ref 6.4–8.2)
PROT UR STRIP-MCNC: NEGATIVE MG/DL
RBC # BLD AUTO: 3.38 M/UL (ref 3.8–5.2)
RBC #/AREA URNS HPF: NORMAL /HPF (ref 0–5)
SARS-COV-2 RNA RESP QL NAA+PROBE: NOT DETECTED
SODIUM SERPL-SCNC: 136 MMOL/L (ref 136–145)
SOURCE: NORMAL
SP GR UR REFRACTOMETRY: 1.01 (ref 1–1.03)
SPECIMEN HOLD: NORMAL
SPECIMEN HOLD: NORMAL
TROPONIN I SERPL HS-MCNC: 6 NG/L (ref 0–51)
UROBILINOGEN UR QL STRIP.AUTO: 0.2 EU/DL (ref 0.2–1)
WBC # BLD AUTO: 7.7 K/UL (ref 3.6–11)
WBC URNS QL MICRO: NORMAL /HPF (ref 0–4)

## 2025-03-07 PROCEDURE — 84484 ASSAY OF TROPONIN QUANT: CPT

## 2025-03-07 PROCEDURE — 99285 EMERGENCY DEPT VISIT HI MDM: CPT

## 2025-03-07 PROCEDURE — 80053 COMPREHEN METABOLIC PANEL: CPT

## 2025-03-07 PROCEDURE — 85379 FIBRIN DEGRADATION QUANT: CPT

## 2025-03-07 PROCEDURE — 71275 CT ANGIOGRAPHY CHEST: CPT

## 2025-03-07 PROCEDURE — 85025 COMPLETE CBC W/AUTO DIFF WBC: CPT

## 2025-03-07 PROCEDURE — 93010 ELECTROCARDIOGRAM REPORT: CPT | Performed by: INTERNAL MEDICINE

## 2025-03-07 PROCEDURE — 87636 SARSCOV2 & INF A&B AMP PRB: CPT

## 2025-03-07 PROCEDURE — 74177 CT ABD & PELVIS W/CONTRAST: CPT

## 2025-03-07 PROCEDURE — 93005 ELECTROCARDIOGRAM TRACING: CPT | Performed by: PHYSICIAN ASSISTANT

## 2025-03-07 PROCEDURE — 71046 X-RAY EXAM CHEST 2 VIEWS: CPT

## 2025-03-07 PROCEDURE — 81001 URINALYSIS AUTO W/SCOPE: CPT

## 2025-03-07 PROCEDURE — 6360000004 HC RX CONTRAST MEDICATION: Performed by: STUDENT IN AN ORGANIZED HEALTH CARE EDUCATION/TRAINING PROGRAM

## 2025-03-07 PROCEDURE — 93971 EXTREMITY STUDY: CPT

## 2025-03-07 RX ORDER — CEPHALEXIN 500 MG/1
500 CAPSULE ORAL 3 TIMES DAILY
Qty: 21 CAPSULE | Refills: 0 | Status: SHIPPED | OUTPATIENT
Start: 2025-03-07 | End: 2025-03-14

## 2025-03-07 RX ORDER — IOPAMIDOL 755 MG/ML
100 INJECTION, SOLUTION INTRAVASCULAR
Status: COMPLETED | OUTPATIENT
Start: 2025-03-07 | End: 2025-03-07

## 2025-03-07 RX ADMIN — IOPAMIDOL 100 ML: 755 INJECTION, SOLUTION INTRAVENOUS at 01:59

## 2025-03-07 ASSESSMENT — PAIN DESCRIPTION - FREQUENCY: FREQUENCY: INTERMITTENT

## 2025-03-07 ASSESSMENT — PAIN - FUNCTIONAL ASSESSMENT
PAIN_FUNCTIONAL_ASSESSMENT: 0-10
PAIN_FUNCTIONAL_ASSESSMENT: ACTIVITIES ARE NOT PREVENTED

## 2025-03-07 ASSESSMENT — PAIN DESCRIPTION - ORIENTATION: ORIENTATION: RIGHT

## 2025-03-07 ASSESSMENT — LIFESTYLE VARIABLES
HOW MANY STANDARD DRINKS CONTAINING ALCOHOL DO YOU HAVE ON A TYPICAL DAY: PATIENT DOES NOT DRINK
HOW OFTEN DO YOU HAVE A DRINK CONTAINING ALCOHOL: NEVER

## 2025-03-07 ASSESSMENT — PAIN DESCRIPTION - LOCATION: LOCATION: ABDOMEN

## 2025-03-07 ASSESSMENT — PAIN SCALES - GENERAL: PAINLEVEL_OUTOF10: 6

## 2025-03-07 ASSESSMENT — PAIN DESCRIPTION - DESCRIPTORS: DESCRIPTORS: DISCOMFORT

## 2025-03-07 NOTE — ED PROVIDER NOTES
[facility] EMERGENCY DEPARTMENT  EMERGENCY DEPARTMENT ENCOUNTER      Pt Name: Michael Meeks  MRN: [id number]  Birthdate 1985  Date of evaluation: 3/7/2025  Provider: Guy Danielson DO    CHIEF COMPLAINT       Chief Complaint   Patient presents with    Post-op Problem         HISTORY OF PRESENT ILLNESS   (Location/Symptom, Timing/Onset, Context/Setting, Quality, Duration, Modifying Factors, Severity)  Note limiting factors.   HISTORY OF PRESENT ILLNESS:  A 39-year-old female with a past medical history significant for asthma and uterine fibroids presents with concerns for post-operative complications following a myomectomy and lysis of adhesions performed on March 4th. She reports bilateral lower extremity burning, more pronounced on the right side, accompanied by swelling in the right calf and pain behind the right knee. Additionally, she experiences shortness of breath that has worsened over the past day, particularly when trying to sleep or upon waking. She describes a pinching chest pain but denies any cough or fever. Her bowel movements are normal, and she reports no vomiting or diarrhea. She also notes mild vaginal bleeding and pain in the right lower quadrant of the abdomen, which has been present since surgery but is worsening. Redness has been observed at the right lower quadrant incision site.    PAST MEDICAL HISTORY:  - History of asthma and uterine fibroids.    PAST SURGICAL HISTORY:  - Status post myomectomy and lysis of adhesions on March 4th    PHYSICAL EXAM:  Vitals: Interpreted as normal for this patient.  General: NAD. Well-kept female adult.  Eyes: Appear normal with no scleral icterus.  HENT: Atraumatic.   Neck: Atraumatic, supple.  Cardiac: Regular rate, regular rhythm, no significant murmurs appreciated.  Respiratory: No respiratory distress, clear lungs bilaterally with no abnormal breath sounds.  Abdomen: Right lower abdomen incision has mild surrounding erythema and

## 2025-03-07 NOTE — ED NOTES
12:31 AM  I have evaluated the patient as the Provider in Rapid Medical Evaluation (RME). I have reviewed her vital signs and the triage nurse assessment. I have talked with the patient and any available family and advised that I am the provider in triage and have ordered the appropriate study to initiate their work up based on the clinical presentation during my assessment. I have advised that the patient will be accommodated in the Main ED as soon as possible. I have also requested to contact the triage nurse or myself immediately if the patient experiences any changes in their condition during this brief waiting period.    Had myomectomy 2 days ago.  R leg pain/swelling, CP, SOB.  99 at home.      CIRO Perkins Lindsay H, PA  03/07/25 0032

## 2025-03-07 NOTE — ED TRIAGE NOTES
Pt to triage from home w/ c/o post op RLQ pain and bilat LE \"burning'  after uterine fibroid surgery 2 days ago. No drainage or bleeding noted @ the 4 incision sites  Pt denies fever higher than 99.0 @ home.  Last pain med around MN and pt has had a BM earlier today.  Pt said she did feel SOB and took 2 breathing txs today

## 2025-03-11 ENCOUNTER — TELEPHONE (OUTPATIENT)
Age: 40
End: 2025-03-11

## 2025-03-11 NOTE — TELEPHONE ENCOUNTER
Patient called stating that she was feeling under the weather. She said that she went to the ER and was tested for covid and it was negative. She recently had surgery. She said that she had complications. She said that she has her post op appointment today.She stated that her temperature was 96.2.  Spoke to maciej and she advised to send a message back to have a nurse reach discuss her symptoms and find out how she is taking her temperature. Please call her back at [phone]

## 2025-04-16 ENCOUNTER — OFFICE VISIT (OUTPATIENT)
Age: 40
End: 2025-04-16
Payer: COMMERCIAL

## 2025-04-16 VITALS
OXYGEN SATURATION: 99 % | DIASTOLIC BLOOD PRESSURE: 60 MMHG | TEMPERATURE: 97.5 F | HEART RATE: 78 BPM | SYSTOLIC BLOOD PRESSURE: 100 MMHG | HEIGHT: 65 IN | WEIGHT: 146.2 LBS | RESPIRATION RATE: 21 BRPM | BODY MASS INDEX: 24.36 KG/M2

## 2025-04-16 DIAGNOSIS — N20.0 NEPHROLITHIASIS: ICD-10-CM

## 2025-04-16 DIAGNOSIS — K21.9 GASTRO-ESOPHAGEAL REFLUX DISEASE WITHOUT ESOPHAGITIS: ICD-10-CM

## 2025-04-16 DIAGNOSIS — Z87.440 HISTORY OF RECURRENT UTIS: Primary | ICD-10-CM

## 2025-04-16 DIAGNOSIS — K22.9 ESOPHAGUS DISORDER: ICD-10-CM

## 2025-04-16 PROCEDURE — 99214 OFFICE O/P EST MOD 30 MIN: CPT | Performed by: INTERNAL MEDICINE

## 2025-04-16 RX ORDER — CLINDAMYCIN PHOSPHATE AND BENZOYL PEROXIDE 10; 50 MG/G; MG/G
GEL TOPICAL
Qty: 45 G | Refills: 3 | Status: CANCELLED | OUTPATIENT
Start: 2025-04-16

## 2025-04-16 ASSESSMENT — ENCOUNTER SYMPTOMS
BACK PAIN: 1
RESPIRATORY NEGATIVE: 1
TROUBLE SWALLOWING: 0
VOICE CHANGE: 0

## 2025-04-16 ASSESSMENT — PATIENT HEALTH QUESTIONNAIRE - PHQ9
SUM OF ALL RESPONSES TO PHQ QUESTIONS 1-9: 0
2. FEELING DOWN, DEPRESSED OR HOPELESS: NOT AT ALL
SUM OF ALL RESPONSES TO PHQ QUESTIONS 1-9: 0
1. LITTLE INTEREST OR PLEASURE IN DOING THINGS: NOT AT ALL

## 2025-04-16 NOTE — PROGRESS NOTES
Chief Complaint   Patient presents with    Allergies    Post-op Problem       \"Have you been to the ER, urgent care clinic since your last visit?  Hospitalized since your last visit?\"    3/7/2025 (3 hours)  [facility]  Post-op Problem   “Have you seen or consulted any other health care providers outside our system since your last visit?”    NO

## 2025-04-16 NOTE — PROGRESS NOTES
Subjective    Michael Meeks is a 39 y.o. female who presents today for the following:  Chief Complaint   Patient presents with    Allergies    Post-op Problem       History of Present Illness  The patient presents for evaluation of upper respiratory symptoms, nephrolithiasis, and esophageal fluid.    She reports a general feeling of malaise, which she attributes to her allergies. Symptoms initially included nasal congestion and rhinorrhea, but have since evolved to include pharyngeal discomfort and sleep disturbances due to difficulty breathing and a dry throat. She recalls an incident of heavy pollen exposure without a mask, which she believes may have triggered her current symptoms. A productive cough with green sputum began 2 days ago, accompanied by sinus pain and pressure. She has been self-medicating with Mucinex for sinus symptoms and headaches, which has resulted in the production of thick, green mucus. No fevers have been experienced. She is currently taking Claritin, which provides some relief, but symptoms persist. A humidifier is used at night.    A myomectomy was performed on 03/04/2025 by Dr. Parker. She presented to the emergency department on 03/07/2025 with postoperative complications, including pain at the site of her larger incision, leg swelling, and chest and lower back swelling. A CT scan revealed a kidney stone, which she was informed about on Monday. Elevated levels were also noted. Prior to surgery, she experienced constant back pain, managed with a nightly heating pad. Frequent urination, up to 3 times in 45 minutes, was reported, attributed to high water intake. These symptoms could be due to the kidney stone. Back pain and frequent UTIs continue, with the last UTI occurring just before surgery. She has not previously consulted a urologist. No hematuria is reported.    She experiences a sensation of food being stuck in her esophagus, for which famotidine is taken daily. The medication is

## 2025-04-22 ENCOUNTER — TELEPHONE (OUTPATIENT)
Age: 40
End: 2025-04-22

## 2025-04-22 DIAGNOSIS — R23.8 SKIN IRRITATION: Primary | ICD-10-CM

## 2025-04-22 DIAGNOSIS — R05.1 ACUTE COUGH: Primary | ICD-10-CM

## 2025-04-22 DIAGNOSIS — R05.1 ACUTE COUGH: ICD-10-CM

## 2025-04-22 RX ORDER — AZITHROMYCIN 250 MG/1
TABLET, FILM COATED ORAL
Qty: 6 TABLET | Refills: 0 | Status: SHIPPED | OUTPATIENT
Start: 2025-04-22 | End: 2025-04-22 | Stop reason: SDUPTHER

## 2025-04-22 RX ORDER — AZITHROMYCIN 250 MG/1
TABLET, FILM COATED ORAL
Qty: 6 TABLET | Refills: 0 | Status: SHIPPED | OUTPATIENT
Start: 2025-04-22 | End: 2025-05-02

## 2025-04-22 NOTE — TELEPHONE ENCOUNTER
Pt stated that the over the counter allergy medication isn't working.   Pt is requesting an antibiotic due to lingering symptoms.     Pt also stated her cream discussed during appointment was never called in. Please review    Phone: [phone]

## 2025-04-22 NOTE — TELEPHONE ENCOUNTER
Clindamy-Benzoyl Per-Niacinam 1-2.5-4 % GEL     Please return call to pharmacy to discuss alternatives.   Phone: [phone]

## 2025-04-25 DIAGNOSIS — L30.9 ACUTE ECZEMA: Primary | ICD-10-CM

## 2025-04-25 RX ORDER — CLINDAMYCIN PHOSPHATE AND BENZOYL PEROXIDE 10; 50 MG/G; MG/G
GEL TOPICAL
Qty: 45 G | Refills: 3 | Status: SHIPPED | OUTPATIENT
Start: 2025-04-25

## 2025-07-13 DIAGNOSIS — G89.29 CHRONIC PAIN OF BOTH KNEES: ICD-10-CM

## 2025-07-13 DIAGNOSIS — M25.561 CHRONIC PAIN OF BOTH KNEES: ICD-10-CM

## 2025-07-13 DIAGNOSIS — M79.642 PAIN IN BOTH HANDS: ICD-10-CM

## 2025-07-13 DIAGNOSIS — M25.562 CHRONIC PAIN OF BOTH KNEES: ICD-10-CM

## 2025-07-13 DIAGNOSIS — M79.641 PAIN IN BOTH HANDS: ICD-10-CM

## 2025-07-14 RX ORDER — NAPROXEN 500 MG/1
500 TABLET ORAL 2 TIMES DAILY WITH MEALS
Qty: 60 TABLET | Refills: 1 | OUTPATIENT
Start: 2025-07-14

## 2025-07-18 ENCOUNTER — OFFICE VISIT (OUTPATIENT)
Age: 40
End: 2025-07-18

## 2025-07-18 VITALS
TEMPERATURE: 98.1 F | WEIGHT: 145.5 LBS | OXYGEN SATURATION: 99 % | RESPIRATION RATE: 16 BRPM | HEART RATE: 78 BPM | BODY MASS INDEX: 24.21 KG/M2 | DIASTOLIC BLOOD PRESSURE: 62 MMHG | SYSTOLIC BLOOD PRESSURE: 107 MMHG

## 2025-07-18 DIAGNOSIS — R10.2 PELVIC PAIN: Primary | ICD-10-CM

## 2025-07-18 DIAGNOSIS — M54.50 ACUTE BILATERAL LOW BACK PAIN WITHOUT SCIATICA: ICD-10-CM

## 2025-07-18 DIAGNOSIS — R31.29 MICROSCOPIC HEMATURIA: ICD-10-CM

## 2025-07-18 DIAGNOSIS — Z87.442 HISTORY OF KIDNEY STONES: ICD-10-CM

## 2025-07-18 LAB
BILIRUBIN, URINE, POC: NEGATIVE
BLOOD URINE, POC: NORMAL
GLUCOSE URINE, POC: NEGATIVE
KETONES, URINE, POC: NEGATIVE
LEUKOCYTE ESTERASE, URINE, POC: NEGATIVE
NITRITE, URINE, POC: NEGATIVE
PH, URINE, POC: 7 (ref 4.6–8)
PROTEIN,URINE, POC: NEGATIVE
SPECIFIC GRAVITY, URINE, POC: 1.02 (ref 1–1.03)
URINALYSIS CLARITY, POC: CLEAR
URINALYSIS COLOR, POC: YELLOW
UROBILINOGEN, POC: NORMAL

## 2025-07-18 RX ORDER — METHOCARBAMOL 500 MG/1
750 TABLET, FILM COATED ORAL EVERY 8 HOURS PRN
Qty: 60 TABLET | Refills: 0 | Status: SHIPPED | OUTPATIENT
Start: 2025-07-18 | End: 2025-07-28

## 2025-07-18 RX ORDER — METHYLPREDNISOLONE 4 MG/1
TABLET ORAL
Qty: 21 TABLET | Refills: 0 | Status: SHIPPED | OUTPATIENT
Start: 2025-07-18 | End: 2025-07-24

## 2025-07-18 RX ORDER — NAPROXEN 500 MG/1
TABLET ORAL
COMMUNITY

## 2025-07-18 RX ORDER — TAMSULOSIN HYDROCHLORIDE 0.4 MG/1
0.4 CAPSULE ORAL DAILY
Qty: 30 CAPSULE | Refills: 0 | Status: SHIPPED | OUTPATIENT
Start: 2025-07-18

## 2025-07-18 ASSESSMENT — ENCOUNTER SYMPTOMS: BACK PAIN: 1

## 2025-07-19 NOTE — PROGRESS NOTES
Michael Meeks (:  1985) is a 40 y.o. female,New patient, here for evaluation of the following chief complaint(s):  Back Pain (Pelvic pain x 1 week, lower back pain x 2 weeks)      Assessment & Plan :  Visit Diagnoses and Associated Orders         Pelvic pain    -  Primary    AMB POC URINALYSIS DIP STICK AUTO W/O MICRO [16597 CPT(R)]      Nuswab Vaginitis Plus (VG+) [65938 Custom]   - Future Order    tamsulosin (FLOMAX) 0.4 MG capsule [889893]      Nuswab Vaginitis Plus (VG+) [99100 Custom]             Microscopic hematuria               History of kidney stones               Acute bilateral low back pain without sciatica        methylPREDNISolone (MEDROL DOSEPACK) 4 MG tablet [4991]      methocarbamol (ROBAXIN) 500 MG tablet [4971]           ORDERS WITHOUT AN ASSOCIATED DIAGNOSIS    naproxen (NAPROSYN) 500 MG tablet [5393]              Will let you know the results of your swab after we get the results, likely 3 to 4 days    You have trace blood in your urine, certainly could be a small kidney stone in the past.    You can trial tamsulosin: 1 pill once daily for up to 4 weeks to help with the passing of kidney stones    Call and schedule a follow-up with urologist    Medrol Dosepak: These are steroids and will help with inflammation and back pain.  Follow the directions on the blister pack    Methocarbamol for muscle: 1 pill every 6-8 hours as needed for muscle spasms and back pain.  This is a muscle relaxer and might make you drowsy so please use caution while taking    This patient presents with back pain most consistent with muscle spasms and strain.  No back pain red flags on history or physical. Presentation not consistent with malignancy (lack of history of malignancy,), fracture (no trauma, no bony tenderness to palpation), cauda equina (no bowel or urinary incontinence/retention, no saddle anesthesia, no distal weakness), , osteomyelitis or epidural abscess (no IVDU, vertebral tenderness),

## 2025-07-19 NOTE — PATIENT INSTRUCTIONS
Will let you know the results of your swab after we get the results, likely 3 to 4 days    You have trace blood in your urine, certainly could be a small kidney stone in the past.    You can trial tamsulosin: 1 pill once daily for up to 4 weeks to help with the passing of kidney stones    Call and schedule a follow-up with urologist    Medrol Dosepak: These are steroids and will help with inflammation and back pain.  Follow the directions on the blister pack    Methocarbamol for muscle: 1 pill every 6-8 hours as needed for muscle spasms and back pain.  This is a muscle relaxer and might make you drowsy so please use caution while taking

## 2025-07-22 LAB
A VAGINAE DNA VAG QL NAA+PROBE: ABNORMAL SCORE
BVAB2 DNA VAG QL NAA+PROBE: ABNORMAL SCORE
C ALBICANS DNA VAG QL NAA+PROBE: NEGATIVE
C GLABRATA DNA VAG QL NAA+PROBE: NEGATIVE
C TRACH RRNA SPEC QL NAA+PROBE: NEGATIVE
MEGA1 DNA VAG QL NAA+PROBE: ABNORMAL SCORE
N GONORRHOEA RRNA SPEC QL NAA+PROBE: NEGATIVE
SPECIMEN SOURCE: ABNORMAL
T VAGINALIS RRNA SPEC QL NAA+PROBE: NEGATIVE

## 2025-08-06 ENCOUNTER — HOSPITAL ENCOUNTER (EMERGENCY)
Facility: HOSPITAL | Age: 40
Discharge: HOME | End: 2025-08-07
Attending: EMERGENCY MEDICINE
Payer: MEDICAID

## 2025-08-06 ENCOUNTER — APPOINTMENT (OUTPATIENT)
Facility: HOSPITAL | Age: 40
End: 2025-08-06
Payer: MEDICAID

## 2025-08-06 VITALS
OXYGEN SATURATION: 98 % | HEIGHT: 65 IN | BODY MASS INDEX: 24.16 KG/M2 | DIASTOLIC BLOOD PRESSURE: 74 MMHG | HEART RATE: 84 BPM | RESPIRATION RATE: 16 BRPM | WEIGHT: 145 LBS | SYSTOLIC BLOOD PRESSURE: 113 MMHG | TEMPERATURE: 98.7 F

## 2025-08-06 DIAGNOSIS — N76.0 BV (BACTERIAL VAGINOSIS): ICD-10-CM

## 2025-08-06 DIAGNOSIS — N83.201 CYSTS OF BOTH OVARIES: ICD-10-CM

## 2025-08-06 DIAGNOSIS — B96.89 BV (BACTERIAL VAGINOSIS): ICD-10-CM

## 2025-08-06 DIAGNOSIS — R10.2 PELVIC PAIN: Primary | ICD-10-CM

## 2025-08-06 DIAGNOSIS — N83.202 CYSTS OF BOTH OVARIES: ICD-10-CM

## 2025-08-06 LAB
APPEARANCE UR: ABNORMAL
BACTERIA URNS QL MICRO: ABNORMAL /HPF
BILIRUB UR QL: NEGATIVE
CLUE CELLS VAG QL WET PREP: ABNORMAL
COLOR UR: ABNORMAL
EPITH CASTS URNS QL MICRO: ABNORMAL /LPF
GLUCOSE UR STRIP.AUTO-MCNC: NEGATIVE MG/DL
HCG UR QL: NEGATIVE
HGB UR QL STRIP: ABNORMAL
KETONES UR QL STRIP.AUTO: NEGATIVE MG/DL
LEUKOCYTE ESTERASE UR QL STRIP.AUTO: NEGATIVE
NITRITE UR QL STRIP.AUTO: NEGATIVE
PH UR STRIP: 7 (ref 5–8)
PROT UR STRIP-MCNC: NEGATIVE MG/DL
RBC #/AREA URNS HPF: ABNORMAL /HPF (ref 0–5)
SP GR UR REFRACTOMETRY: 1.01 (ref 1–1.03)
T VAGINALIS VAG QL WET PREP: ABNORMAL
URINE CULTURE IF INDICATED: ABNORMAL
UROBILINOGEN UR QL STRIP.AUTO: 0.2 EU/DL (ref 0.2–1)
WBC URNS QL MICRO: ABNORMAL /HPF (ref 0–4)
YEAST WET PREP: ABNORMAL

## 2025-08-06 PROCEDURE — 87210 SMEAR WET MOUNT SALINE/INK: CPT

## 2025-08-06 PROCEDURE — 81025 URINE PREGNANCY TEST: CPT

## 2025-08-06 PROCEDURE — 81001 URINALYSIS AUTO W/SCOPE: CPT

## 2025-08-06 PROCEDURE — 87591 N.GONORRHOEAE DNA AMP PROB: CPT

## 2025-08-06 PROCEDURE — 6370000000 HC RX 637 (ALT 250 FOR IP): Performed by: EMERGENCY MEDICINE

## 2025-08-06 PROCEDURE — 87491 CHLMYD TRACH DNA AMP PROBE: CPT

## 2025-08-06 PROCEDURE — 6360000002 HC RX W HCPCS: Performed by: EMERGENCY MEDICINE

## 2025-08-06 PROCEDURE — 76830 TRANSVAGINAL US NON-OB: CPT

## 2025-08-06 PROCEDURE — 96372 THER/PROPH/DIAG INJ SC/IM: CPT

## 2025-08-06 PROCEDURE — 99284 EMERGENCY DEPT VISIT MOD MDM: CPT

## 2025-08-06 PROCEDURE — 76856 US EXAM PELVIC COMPLETE: CPT

## 2025-08-06 RX ORDER — DOXYCYCLINE HYCLATE 100 MG
100 TABLET ORAL
Status: COMPLETED | OUTPATIENT
Start: 2025-08-06 | End: 2025-08-06

## 2025-08-06 RX ADMIN — DOXYCYCLINE HYCLATE 100 MG: 100 TABLET, COATED ORAL at 23:22

## 2025-08-06 RX ADMIN — LIDOCAINE HYDROCHLORIDE 1000 MG: 10 INJECTION, SOLUTION EPIDURAL; INFILTRATION; INTRACAUDAL; PERINEURAL at 23:21

## 2025-08-06 ASSESSMENT — PAIN SCALES - GENERAL: PAINLEVEL_OUTOF10: 5

## 2025-08-06 ASSESSMENT — ENCOUNTER SYMPTOMS
BACK PAIN: 1
SHORTNESS OF BREATH: 0

## 2025-08-06 ASSESSMENT — PAIN - FUNCTIONAL ASSESSMENT
PAIN_FUNCTIONAL_ASSESSMENT: 0-10
PAIN_FUNCTIONAL_ASSESSMENT: ACTIVITIES ARE NOT PREVENTED

## 2025-08-06 ASSESSMENT — PAIN DESCRIPTION - PAIN TYPE: TYPE: ACUTE PAIN

## 2025-08-06 ASSESSMENT — PAIN DESCRIPTION - DESCRIPTORS: DESCRIPTORS: CRAMPING

## 2025-08-06 ASSESSMENT — PAIN DESCRIPTION - LOCATION: LOCATION: PELVIS

## 2025-08-07 RX ORDER — HYDROCODONE BITARTRATE AND ACETAMINOPHEN 5; 325 MG/1; MG/1
1 TABLET ORAL EVERY 6 HOURS PRN
Qty: 5 TABLET | Refills: 0 | Status: SHIPPED | OUTPATIENT
Start: 2025-08-07 | End: 2025-08-10

## 2025-08-07 RX ORDER — FLUCONAZOLE 150 MG/1
150 TABLET ORAL ONCE
Qty: 1 TABLET | Refills: 0 | Status: SHIPPED | OUTPATIENT
Start: 2025-08-07 | End: 2025-08-07

## 2025-08-07 RX ORDER — DOXYCYCLINE HYCLATE 100 MG
100 TABLET ORAL 2 TIMES DAILY
Qty: 13 TABLET | Refills: 0 | Status: SHIPPED | OUTPATIENT
Start: 2025-08-07 | End: 2025-08-14

## 2025-08-07 RX ORDER — ONDANSETRON 4 MG/1
4 TABLET, ORALLY DISINTEGRATING ORAL 3 TIMES DAILY PRN
Qty: 21 TABLET | Refills: 0 | Status: SHIPPED | OUTPATIENT
Start: 2025-08-07

## 2025-08-07 RX ORDER — METRONIDAZOLE 500 MG/1
500 TABLET ORAL 2 TIMES DAILY
Qty: 14 TABLET | Refills: 0 | Status: SHIPPED | OUTPATIENT
Start: 2025-08-07 | End: 2025-08-14

## 2025-08-08 LAB
C TRACH DNA SPEC QL NAA+PROBE: NEGATIVE
N GONORRHOEA DNA SPEC QL NAA+PROBE: NEGATIVE
SAMPLE TYPE: NORMAL
SERVICE CMNT-IMP: NORMAL
SPECIMEN SOURCE: NORMAL

## 2025-09-05 ENCOUNTER — TRANSCRIBE ORDERS (OUTPATIENT)
Facility: HOSPITAL | Age: 40
End: 2025-09-05

## 2025-09-05 DIAGNOSIS — Z12.31 ENCOUNTER FOR SCREENING MAMMOGRAM FOR MALIGNANT NEOPLASM OF BREAST: Primary | ICD-10-CM

## (undated) DEVICE — SOLUTION IRRIG 1000ML 09% SOD CHL USP PIC PLAS CONTAINER

## (undated) DEVICE — SEAL

## (undated) DEVICE — ARM DRAPE

## (undated) DEVICE — TRI-LUMEN FILTERED TUBE SET WITH ACTIVATED CHARCOAL FILTER: Brand: AIRSEAL

## (undated) DEVICE — SEALANT TISS 10 ML FIBRIN VISTASEAL

## (undated) DEVICE — Device

## (undated) DEVICE — BAG SPEC REM 224ML W4XL6IN DIA10MM 1 HND GYN DISP ENDOPCH

## (undated) DEVICE — INTENT OT USE PROVIDES A STERILE INTERFACE BETWEEN THE OPERATING ROOM SURGICAL LAMPS (NON-STERILE) AND THE SURGEON OR STAFF WORKING IN THE STERILE FIELD.: Brand: ASPEN® ALC PLUS LIGHT HANDLE COVER

## (undated) DEVICE — TIP COVER ACCESSORY

## (undated) DEVICE — GYN LAPAROSCOPY - SMH: Brand: MEDLINE INDUSTRIES, INC.

## (undated) DEVICE — TAPE,CLOTH/SILK,CURAD,3"X10YD,LF,40/CS: Brand: CURAD

## (undated) DEVICE — AGENT HEMSTAT 3GM OXIDIZED REGENERATED CELOS ABSRB FOR CONT (ORDER MULTIPLES OF 5EA)

## (undated) DEVICE — X-RAY DETECTABLE SPONGES,16 PLY: Brand: VISTEC

## (undated) DEVICE — INSUFFLATION NEEDLE TO ESTABLISH PNEUMOPERITONEUM.: Brand: INSUFFLATION NEEDLE

## (undated) DEVICE — AIRSEAL 12 MM ACCESS PORT AND PALM GRIP OBTURATOR WITH BLADELESS OPTICAL TIP, 120 MM LENGTH: Brand: AIRSEAL

## (undated) DEVICE — ELECTRO LUBE IS A SINGLE PATIENT USE DEVICE THAT IS INTENDED TO BE USED ON ELECTROSURGICAL ELECTRODES TO REDUCE STICKING.: Brand: KEY SURGICAL ELECTRO LUBE

## (undated) DEVICE — SYRINGE MED 10ML LUERLOCK TIP W/O SFTY DISP

## (undated) DEVICE — BLADE CLIPPER GEN PURP NS

## (undated) DEVICE — BLADELESS OBTURATOR: Brand: WECK VISTA

## (undated) DEVICE — SUTURE MONOCRYL + SZ 4-0 L27IN ABSRB UD L19MM PS-2 3/8 CIR MCP426H

## (undated) DEVICE — APPLICATOR LAP 45CM FLX 2 VISTASEAL